# Patient Record
Sex: MALE | Race: WHITE | Employment: OTHER | URBAN - METROPOLITAN AREA
[De-identification: names, ages, dates, MRNs, and addresses within clinical notes are randomized per-mention and may not be internally consistent; named-entity substitution may affect disease eponyms.]

---

## 2017-01-05 ENCOUNTER — TELEPHONE (OUTPATIENT)
Dept: CARDIOLOGY CLINIC | Age: 82
End: 2017-01-05

## 2017-01-05 NOTE — TELEPHONE ENCOUNTER
----- Message from Jp Duarte MD sent at 1/5/2017 12:53 AM EST -----  Echo shows normal heart function, valve.

## 2017-06-20 ENCOUNTER — CLINICAL SUPPORT (OUTPATIENT)
Dept: CARDIOLOGY CLINIC | Age: 82
End: 2017-06-20

## 2017-06-20 DIAGNOSIS — R00.1 BRADYCARDIA: ICD-10-CM

## 2017-06-20 DIAGNOSIS — I49.5 SSS (SICK SINUS SYNDROME) (HCC): ICD-10-CM

## 2017-06-20 DIAGNOSIS — Z95.0 PACEMAKER: Primary | ICD-10-CM

## 2017-06-23 NOTE — PROGRESS NOTES
See device report - MDT DCPM in office device check, next check in 6 months, declines remote home monitoring.

## 2017-11-09 ENCOUNTER — TELEPHONE (OUTPATIENT)
Dept: CARDIOLOGY CLINIC | Age: 82
End: 2017-11-09

## 2017-11-09 NOTE — TELEPHONE ENCOUNTER
Verified patient with two identifiers. Spoke with pt, asking for refill on metoprolol, advised pt to set up an appt with Dr. Loren Chaudhry or PCP for a refill since we have not seen him in office since 12/16. Pt stated he would call his PCP.

## 2017-11-09 NOTE — TELEPHONE ENCOUNTER
Please call pt about the status of the refill request for his metoprolol that was faxed (he said multiple times) from [facility], last time 11/6/17.     Thanks,

## 2018-01-04 ENCOUNTER — CLINICAL SUPPORT (OUTPATIENT)
Dept: CARDIOLOGY CLINIC | Age: 83
End: 2018-01-04

## 2018-01-04 DIAGNOSIS — Z95.0 PACEMAKER: Primary | ICD-10-CM

## 2018-01-04 DIAGNOSIS — I49.5 SSS (SICK SINUS SYNDROME) (HCC): ICD-10-CM

## 2018-01-04 DIAGNOSIS — R00.1 BRADYCARDIA: ICD-10-CM

## 2019-01-24 ENCOUNTER — OFFICE VISIT (OUTPATIENT)
Dept: CARDIOLOGY CLINIC | Age: 84
End: 2019-01-24

## 2019-01-24 ENCOUNTER — CLINICAL SUPPORT (OUTPATIENT)
Dept: CARDIOLOGY CLINIC | Age: 84
End: 2019-01-24

## 2019-01-24 VITALS
SYSTOLIC BLOOD PRESSURE: 124 MMHG | WEIGHT: 152.2 LBS | BODY MASS INDEX: 21.79 KG/M2 | RESPIRATION RATE: 18 BRPM | HEIGHT: 70 IN | DIASTOLIC BLOOD PRESSURE: 84 MMHG | HEART RATE: 88 BPM

## 2019-01-24 DIAGNOSIS — R00.1 BRADYCARDIA: ICD-10-CM

## 2019-01-24 DIAGNOSIS — Z95.0 PACEMAKER: ICD-10-CM

## 2019-01-24 DIAGNOSIS — I25.10 CORONARY ARTERY DISEASE INVOLVING NATIVE HEART WITHOUT ANGINA PECTORIS, UNSPECIFIED VESSEL OR LESION TYPE: ICD-10-CM

## 2019-01-24 DIAGNOSIS — I49.5 SSS (SICK SINUS SYNDROME) (HCC): ICD-10-CM

## 2019-01-24 DIAGNOSIS — Z45.018 PACEMAKER REPROGRAMMING/CHECK: Primary | ICD-10-CM

## 2019-01-24 DIAGNOSIS — I44.2 CHB (COMPLETE HEART BLOCK) (HCC): ICD-10-CM

## 2019-01-24 DIAGNOSIS — Z95.0 CARDIAC PACEMAKER IN SITU: Primary | ICD-10-CM

## 2019-01-24 NOTE — PROGRESS NOTES
1. Have you been to the ER, urgent care clinic since your last visit? Hospitalized since your last visit? No 
 
2. Have you seen or consulted any other health care providers outside of the [address] [place] [address] since your last visit? Include any pap smears or colon screening. No 
 
Chief Complaint Patient presents with  Pacemaker Check Yearly appt. C/O SOB with exertion.

## 2019-01-24 NOTE — PROGRESS NOTES
Subjective:  
  
Amanda Peres is a 80 y.o. male is here for long follow up. He has pmhx significant for CAD with  RCA, aortic stenosis s/p aortic valve replacement in 2014, hyperlipidemia, SSS with pacemaker, and HTN. The patient denies chest pain/ shortness of breath, orthopnea, PND, LE edema, palpitations, syncope, presyncope or fatigue. Notes low grade HA at times. Patient Active Problem List  
 Diagnosis Date Noted  Bradycardia 12/16/2016  SSS (sick sinus syndrome) ([facility] [address]  Hyperlipidemia  H/O aortic valve stenosis  CAD (coronary artery disease), native coronary artery No primary care provider on file. Past Medical History:  
Diagnosis Date  CAD (coronary artery disease), native coronary artery  RCA  
 H/O aortic valve stenosis   
 post valve replacement  Hyperlipidemia  Raynaud's disease  SSS (sick sinus syndrome) ([facility] [address]   
 pacemaker Past Surgical History:  
Procedure Laterality Date  HX AORTIC VALVE REPLACEMENT Allergies Allergen Reactions  Codeine Other (comments)  
   pt states that he became high with morphine--yrs ago 
 pt states that he became high with morphine--yrs ago  Simvastatin Myalgia and Other (comments) And weakness And weakness Family History Problem Relation Age of Onset  Stroke Mother  Stroke Father  Cancer Sister  Heart Disease Brother MI  
 negative for cardiac disease Social History Socioeconomic History  Marital status:  Spouse name: Not on file  Number of children: Not on file  Years of education: Not on file  Highest education level: Not on file Tobacco Use  Smoking status: Current Every Day Smoker Packs/day: 0.50 Years: 30.00 Pack years: 15.00 Types: Cigarettes  Smokeless tobacco: Never Used Substance and Sexual Activity  Alcohol use: Yes Comment: one drink a day Current Outpatient Medications Medication Sig  
 metoprolol tartrate (LOPRESSOR) 25 mg tablet Take 25 mg by mouth daily.  aspirin 81 mg chewable tablet Take 81 mg by mouth daily.  OTHER No current facility-administered medications for this visit. Vitals:  
 01/24/19 1012 BP: 124/84 Pulse: 88 Resp: 18 Weight: 152 lb 3.2 oz (69 kg) Height: 5' 10\" (1.778 m) I have reviewed the nurses notes, vitals, problem list, allergy list, medical history, family, social history and medications. Review of Symptoms: 
 
General: Pt denies excessive weight gain or loss. Pt is able to conduct ADL's HEENT: Denies blurred vision, headaches, epistaxis and difficulty swallowing. Respiratory: Denies shortness of breath, AUGUSTIN, wheezing or stridor. Cardiovascular: Denies precordial pain, palpitations, edema or PND Gastrointestinal: Denies poor appetite, indigestion, abdominal pain or blood in stool Urinary: Denies dysuria, pyuria Musculoskeletal: Denies pain or swelling from muscles or joints Neurologic: Denies tremor, paresthesias, or sensory motor disturbance Skin: Denies rash, itching or texture change. Psych: Denies depression Physical Exam:   
 
General: Well developed, in no acute distress. HEENT: Eyes - PERRL, no jvd Heart:  Normal S1/S2 negative S3 or S4. Regular, no murmur, gallop or rub.  
Respiratory: Clear bilaterally x 4, no wheezing or rales Extremities:  No edema, normal cap refill, no cyanosis. Musculoskeletal: No clubbing Neuro: A&Ox3, speech clear, gait stable. Skin: Skin color is normal. No rashes or lesions. Non diaphoretic Vascular: 2+ pulses symmetric in all extremities Cardiographics Ekg: V paced. No results found for this or any previous visit. No results found for: WBC, HGBPOC, HGB, HGBP, HCTPOC, HCT, PHCT, RBCH, PLT, MCV, HGBEXT, HCTEXT, PLTEXT, HGBEXT, HCTEXT, PLTEXT No results found for: NA, K, CL, CO2, AGAP, GLU, BUN, CREA, BUCR, GFRAA, GFRNA, CA, TBIL, TBILI, GPT, SGOT, AP, TP, ALB, GLOB, AGRAT, ALT No results found for: TSH, TSH2, TSH3, TSHP, TSHEXT, TSHEXT Assessment: ICD-10-CM ICD-9-CM 1. Pacemaker reprogramming/check Z45.018 V53.31 AMB POC EKG ROUTINE W/ 12 LEADS, INTER & REP  
   2D ECHO COMPLETE ADULT (TTE) W OR WO CONTR 2. Bradycardia R00.1 427.89 2D ECHO COMPLETE ADULT (TTE) W OR WO CONTR 3. Pacemaker Z95.0 V45.01 2D ECHO COMPLETE ADULT (TTE) W OR WO CONTR 4. SSS (sick sinus syndrome) (HCC) I49.5 427.81 2D ECHO COMPLETE ADULT (TTE) W OR WO CONTR 5. Coronary artery disease involving native heart without angina pectoris, unspecified vessel or lesion type I25.10 414.01 2D ECHO COMPLETE ADULT (TTE) W OR WO CONTR Orders Placed This Encounter  AMB POC EKG ROUTINE W/ 12 LEADS, INTER & REP Order Specific Question:   Reason for Exam: Answer:   routine  2D ECHO COMPLETE ADULT (TTE) W OR WO CONTR Standing Status:   Future Standing Expiration Date:   1/24/2020 Order Specific Question:   Reason for Exam: Answer:   100% RVP Order Specific Question:   Contrast Enhancement (Bubble Study, Definity, Optison) may be used if criteria listed in established evidence-based protocol has been identified. Answer:   Yes Plan:  
 
Mr Rody Lujan is here for long follow up, last seen in 2016. He has pmhx significant for CAD with  RCA, aortic stenosis s/p aortic valve replacement in 2014, hyperlipidemia, SSS with pacemaker, and HTN. Echo 12/16 with EF 55-60%. He is 83% AP and 100% RVP. Will repeat echo. Anticipate gen change within 2 years. Continue medical management for HTN, CAD and SSS. Thank you for allowing me to participate in Sandy Upton 's care. Gian Connell NP Patient seen and examined. All pertinent data reviewed. I have reviewed detailed note as outlined by Gian Connell NP. Case discussed with Nursing/medical assistant staff and Gian Connell NP. Plans as outlined. Doing well. A paced 87% for sick sinus and v paced 100% for chb. Cont med rx for cad. Obtain echo for sob. F/u in one year if lvef wnl.  
 
Veronica Kelley MD, Howard Rushing

## 2019-02-05 ENCOUNTER — CLINICAL SUPPORT (OUTPATIENT)
Dept: CARDIOLOGY CLINIC | Age: 84
End: 2019-02-05

## 2019-02-05 VITALS
SYSTOLIC BLOOD PRESSURE: 124 MMHG | WEIGHT: 152 LBS | BODY MASS INDEX: 21.76 KG/M2 | DIASTOLIC BLOOD PRESSURE: 84 MMHG | HEIGHT: 70 IN

## 2019-02-05 DIAGNOSIS — I49.5 SSS (SICK SINUS SYNDROME) (HCC): ICD-10-CM

## 2019-02-05 DIAGNOSIS — Z95.0 PACEMAKER: ICD-10-CM

## 2019-02-05 DIAGNOSIS — R00.1 BRADYCARDIA: ICD-10-CM

## 2019-02-05 DIAGNOSIS — Z45.018 PACEMAKER REPROGRAMMING/CHECK: ICD-10-CM

## 2019-02-05 DIAGNOSIS — I25.10 CORONARY ARTERY DISEASE INVOLVING NATIVE HEART WITHOUT ANGINA PECTORIS, UNSPECIFIED VESSEL OR LESION TYPE: ICD-10-CM

## 2019-02-06 LAB
ECHO AO ROOT DIAM: 2.1 CM
ECHO AV AREA PEAK VELOCITY: 1.8 CM2
ECHO AV AREA VTI: 1.7 CM2
ECHO AV AREA/BSA PEAK VELOCITY: 1 CM2/M2
ECHO AV AREA/BSA VTI: 0.9 CM2/M2
ECHO AV MEAN GRADIENT: 5.8 MMHG
ECHO AV PEAK GRADIENT: 10.2 MMHG
ECHO AV PEAK VELOCITY: 159.48 CM/S
ECHO AV VTI: 33.65 CM
ECHO EST RA PRESSURE: 3 MMHG
ECHO LA AREA 4C: 23 CM2
ECHO LA MAJOR AXIS: 3.85 CM
ECHO LA TO AORTIC ROOT RATIO: 1.83
ECHO LA VOL 2C: 78.19 ML (ref 18–58)
ECHO LA VOL 4C: 71.97 ML (ref 18–58)
ECHO LA VOL BP: 81.27 ML (ref 18–58)
ECHO LA VOL/BSA BIPLANE: 43.75 ML/M2 (ref 16–28)
ECHO LA VOLUME INDEX A2C: 42.09 ML/M2 (ref 16–28)
ECHO LA VOLUME INDEX A4C: 38.75 ML/M2 (ref 16–28)
ECHO LV E' LATERAL VELOCITY: 7.8 CM/S
ECHO LV E' SEPTAL VELOCITY: 3.51 CM/S
ECHO LV INTERNAL DIMENSION DIASTOLIC: 3.67 CM (ref 4.2–5.9)
ECHO LV INTERNAL DIMENSION SYSTOLIC: 2.71 CM
ECHO LV IVSD: 1.21 CM (ref 0.6–1)
ECHO LV MASS 2D: 153.5 G (ref 88–224)
ECHO LV MASS INDEX 2D: 82.6 G/M2 (ref 49–115)
ECHO LV POSTERIOR WALL DIASTOLIC: 1.07 CM (ref 0.6–1)
ECHO LVOT DIAM: 1.98 CM
ECHO LVOT PEAK GRADIENT: 3.6 MMHG
ECHO LVOT PEAK VELOCITY: 94.69 CM/S
ECHO LVOT SV: 57.6 ML
ECHO LVOT VTI: 18.79 CM
ECHO MV A VELOCITY: 147.99 CM/S
ECHO MV AREA PHT: 2.8 CM2
ECHO MV AREA VTI: 1.4 CM2
ECHO MV E DECELERATION TIME (DT): 272.7 MS
ECHO MV E VELOCITY: 0.67 CM/S
ECHO MV E/A RATIO: 0
ECHO MV E/E' LATERAL: 0.09
ECHO MV E/E' RATIO (AVERAGED): 0.14
ECHO MV E/E' SEPTAL: 0.19
ECHO MV MAX VELOCITY: 169.94 CM/S
ECHO MV MEAN GRADIENT: 2.9 MMHG
ECHO MV PEAK GRADIENT: 11.6 MMHG
ECHO MV PRESSURE HALF TIME (PHT): 79.1 MS
ECHO MV VTI: 41.59 CM
ECHO PULMONARY ARTERY SYSTOLIC PRESSURE (PASP): 22.6 MMHG
ECHO RIGHT VENTRICULAR SYSTOLIC PRESSURE (RVSP): 22.6 MMHG
ECHO RV TAPSE: 1.65 CM (ref 1.5–2)
ECHO TV REGURGITANT MAX VELOCITY: 221.22 CM/S
ECHO TV REGURGITANT PEAK GRADIENT: 19.6 MMHG

## 2019-02-07 ENCOUNTER — TELEPHONE (OUTPATIENT)
Dept: CARDIOLOGY CLINIC | Age: 84
End: 2019-02-07

## 2019-05-13 ENCOUNTER — CLINICAL SUPPORT (OUTPATIENT)
Dept: CARDIOLOGY CLINIC | Age: 84
End: 2019-05-13

## 2019-05-13 DIAGNOSIS — R00.1 BRADYCARDIA: ICD-10-CM

## 2019-05-13 DIAGNOSIS — I44.2 CHB (COMPLETE HEART BLOCK) (HCC): ICD-10-CM

## 2019-05-13 DIAGNOSIS — Z95.0 PACEMAKER: Primary | ICD-10-CM

## 2019-08-20 ENCOUNTER — OFFICE VISIT (OUTPATIENT)
Dept: CARDIOLOGY CLINIC | Age: 84
End: 2019-08-20

## 2019-08-20 DIAGNOSIS — I49.5 SSS (SICK SINUS SYNDROME) (HCC): ICD-10-CM

## 2019-08-20 DIAGNOSIS — Z95.0 CARDIAC PACEMAKER IN SITU: Primary | ICD-10-CM

## 2019-12-02 ENCOUNTER — CLINICAL SUPPORT (OUTPATIENT)
Dept: CARDIOLOGY CLINIC | Age: 84
End: 2019-12-02

## 2019-12-02 DIAGNOSIS — I49.5 SSS (SICK SINUS SYNDROME) (HCC): ICD-10-CM

## 2019-12-02 DIAGNOSIS — Z95.0 CARDIAC PACEMAKER IN SITU: Primary | ICD-10-CM

## 2019-12-03 ENCOUNTER — TELEPHONE (OUTPATIENT)
Dept: CARDIOLOGY CLINIC | Age: 84
End: 2019-12-03

## 2019-12-03 NOTE — TELEPHONE ENCOUNTER
Called, spoke to pt, two identifiers verified. Pt states sent remote transmission last night, was having trouble with his remote box. Advised pt remote transmission was received. Pt verbalized understanding of information discussed w/ no further questions at this time.      Cinthia Ralph RN

## 2020-01-01 ENCOUNTER — OFFICE VISIT (OUTPATIENT)
Dept: CARDIOLOGY CLINIC | Age: 85
End: 2020-01-01
Payer: MEDICARE

## 2020-01-01 DIAGNOSIS — Z95.0 CARDIAC PACEMAKER IN SITU: Primary | ICD-10-CM

## 2020-01-01 DIAGNOSIS — I44.2 CHB (COMPLETE HEART BLOCK) (HCC): ICD-10-CM

## 2020-01-01 PROCEDURE — 93294 REM INTERROG EVL PM/LDLS PM: CPT | Performed by: INTERNAL MEDICINE

## 2020-02-27 ENCOUNTER — OFFICE VISIT (OUTPATIENT)
Dept: CARDIOLOGY CLINIC | Age: 85
End: 2020-02-27

## 2020-02-27 ENCOUNTER — CLINICAL SUPPORT (OUTPATIENT)
Dept: CARDIOLOGY CLINIC | Age: 85
End: 2020-02-27

## 2020-02-27 VITALS
SYSTOLIC BLOOD PRESSURE: 130 MMHG | RESPIRATION RATE: 18 BRPM | OXYGEN SATURATION: 96 % | WEIGHT: 148 LBS | HEIGHT: 70 IN | DIASTOLIC BLOOD PRESSURE: 80 MMHG | BODY MASS INDEX: 21.19 KG/M2

## 2020-02-27 DIAGNOSIS — Z95.0 CARDIAC PACEMAKER IN SITU: ICD-10-CM

## 2020-02-27 DIAGNOSIS — I49.5 SSS (SICK SINUS SYNDROME) (HCC): Primary | ICD-10-CM

## 2020-02-27 DIAGNOSIS — Z72.0 TOBACCO USE: ICD-10-CM

## 2020-02-27 DIAGNOSIS — Z95.0 CARDIAC PACEMAKER IN SITU: Primary | ICD-10-CM

## 2020-02-27 DIAGNOSIS — R00.1 BRADYCARDIA: ICD-10-CM

## 2020-02-27 DIAGNOSIS — I44.2 CHB (COMPLETE HEART BLOCK) (HCC): ICD-10-CM

## 2020-02-27 DIAGNOSIS — I49.5 SSS (SICK SINUS SYNDROME) (HCC): ICD-10-CM

## 2020-02-27 RX ORDER — FINASTERIDE 5 MG/1
5 TABLET, FILM COATED ORAL
COMMUNITY
Start: 2019-11-14

## 2020-02-27 RX ORDER — TAMSULOSIN HYDROCHLORIDE 0.4 MG/1
CAPSULE ORAL AS NEEDED
COMMUNITY
Start: 2020-01-03 | End: 2021-01-01

## 2020-02-27 NOTE — PROGRESS NOTES
Subjective:      Dieudonne Barrera is a 80 y.o. male is here for EP follow up. The patient denies chest pain, orthopnea, PND, LE edema, palpitations, syncope, presyncope. Some AUGUSTIN and fatigue. Continues to smoke. On new prostate medication that causes dizziness. Patient Active Problem List    Diagnosis Date Noted    Bradycardia 12/16/2016    [facility]SS [facility]sick [facility]) ([facility] [address]     Hyperlipidemia     H/O aortic valve stenosis     CAD (coronary artery disease), native coronary artery       No primary care provider on file. Past Medical History:   Diagnosis Date    CAD (coronary artery disease), native coronary artery      RCA    Creatinine elevation 2020    H/O aortic valve stenosis     post valve replacement    Hyperlipidemia     Raynaud's disease     SSS (sick sinus syndrome) ([facility] [address]     pacemaker      Past Surgical History:   Procedure Laterality Date    HX AORTIC VALVE REPLACEMENT       Allergies   Allergen Reactions    Codeine Other (comments)      pt states that he became high with morphine--yrs ago   pt states that he became high with morphine--yrs ago      Simvastatin Myalgia and Other (comments)     And weakness  And weakness        Family History   Problem Relation Age of Onset    Stroke Mother     Stroke Father     Cancer Sister     Heart Disease Brother         MI    negative for cardiac disease  Social History     Socioeconomic History    Marital status:      Spouse name: Not on file    Number of children: Not on file    Years of education: Not on file    Highest education level: Not on file   Tobacco Use    Smoking status: Current Every Day Smoker     Packs/day: 0.25     Years: 30.00     Pack years: 7.50     Types: Cigarettes    Smokeless tobacco: Never Used   Substance and Sexual Activity    Alcohol use: Not Currently     Current Outpatient Medications   Medication Sig    finasteride (PROSCAR) 5 mg tablet Take 5 mg by mouth.     tamsulosin (FLOMAX) 0.4 mg capsule tamsulosin 0.4 mg capsule    metoprolol tartrate (LOPRESSOR) 25 mg tablet Take 25 mg by mouth daily.  aspirin 81 mg chewable tablet Take 81 mg by mouth daily.  OTHER      No current facility-administered medications for this visit. Vitals:    02/27/20 1343   BP: 130/80   Resp: 18   SpO2: 96%   Weight: 148 lb (67.1 kg)   Height: 5' 10\" (1.778 m)       I have reviewed the nurses notes, vitals, problem list, allergy list, medical history, family, social history and medications. Review of Symptoms:    General: Pt denies excessive weight gain or loss. Pt is able to conduct ADL's  HEENT: Denies blurred vision, headaches, epistaxis and difficulty swallowing. Respiratory: Denies shortness of breath, AUGUSTIN, wheezing or stridor. Cardiovascular: Denies precordial pain, palpitations, edema or PND  Gastrointestinal: Denies poor appetite, indigestion, abdominal pain or blood in stool  Urinary: Denies dysuria, pyuria  Musculoskeletal: Denies pain or swelling from muscles or joints  Neurologic: Denies tremor, paresthesias, or sensory motor disturbance  Skin: Denies rash, itching or texture change. Psych: Denies depression      Physical Exam:      General: Well developed, in no acute distress. HEENT: Eyes - PERRL, no jvd  Heart:  Normal S1/S2 negative S3 or S4. Regular, no murmur, gallop or rub. Respiratory: Clear bilaterally x 4, no wheezing or rales  Extremities:  No edema, normal cap refill, no cyanosis. Musculoskeletal: No clubbing  Neuro: A&Ox3, speech clear, gait stable. Skin: Skin color is normal. No rashes or lesions. Non diaphoretic  Vascular: 2+ pulses symmetric in all extremities    Cardiographics    Ekg: V paced      No results found for this or any previous visit.       No results found for: WBC, HGBPOC, HGB, HGBP, HCTPOC, HCT, PHCT, RBCH, PLT, MCV, HGBEXT, HCTEXT, PLTEXT, HGBEXT, HCTEXT, PLTEXT   No results found for: NA, K, CL, CO2, AGAP, GLU, BUN, CREA, BUCR, GFRAA, GFRNA, CA, TBIL, TBILI, GPT, SGOT, AP, TP, ALB, GLOB, AGRAT, ALT   No results found for: TSH, TSH2, TSH3, TSHP, TSHEXT, TSHEXT     Assessment:           ICD-10-CM ICD-9-CM    1. SSS (sick sinus syndrome) ([facility]) I49.5 427.81 AMB POC EKG ROUTINE W/ 12 LEADS, INTER & REP   2. CHB (complete heart block) ([facility]) I44.2 426.0    3. Bradycardia R00.1 427.89    4. Cardiac pacemaker in situ Z95.0 V45.01    5. Tobacco use Z72.0 305.1      Orders Placed This Encounter    AMB POC EKG ROUTINE W/ 12 LEADS, INTER & REP     Order Specific Question:   Reason for Exam:     Answer:   routine    finasteride (PROSCAR) 5 mg tablet     Sig: Take 5 mg by mouth.  tamsulosin (FLOMAX) 0.4 mg capsule     Sig: tamsulosin 0.4 mg capsule        Plan:     Mr Oliverio Winters is here for annual follow up and device check. He is 81.6% AP and 100% RVP with no events. Some dyspnea on exertion and fatigue. Last echo 3/19 - EF 51 - 55%. He has 18 mo remaining on his battery. Will continue to monitor remotely and follow up in 1 year. Continue medical management for SSS, HNT and tobacco use. Thank you for allowing me to participate in Garth Sotelo 's care.       Mindy Morrell NP

## 2020-02-27 NOTE — PROGRESS NOTES
Verified patient with 2 identifiers   Reviewed record in preparation for visit and obtained necessary documentation. Chief Complaint   Patient presents with    Pacemaker Check     Annual follow up     Shortness of Breath     cut down cigarettes  to 4 per day      1. Have you been to the ER, urgent care clinic since your last visit? Hospitalized since your last visit? No     2. Have you seen or consulted any other health care providers outside of the [address] [place] [address] since your last visit? Include any pap smears or colon screening.   Dr Melva Acevedo DMD

## 2020-09-14 ENCOUNTER — OFFICE VISIT (OUTPATIENT)
Dept: CARDIOLOGY CLINIC | Age: 85
End: 2020-09-14

## 2020-09-14 DIAGNOSIS — I44.2 CHB (COMPLETE HEART BLOCK) (HCC): ICD-10-CM

## 2020-09-14 DIAGNOSIS — I49.5 SSS (SICK SINUS SYNDROME) (HCC): Primary | ICD-10-CM

## 2020-09-14 DIAGNOSIS — Z95.0 CARDIAC PACEMAKER IN SITU: ICD-10-CM

## 2020-09-14 PROCEDURE — 93294 REM INTERROG EVL PM/LDLS PM: CPT | Performed by: INTERNAL MEDICINE

## 2020-09-14 PROCEDURE — 93296 REM INTERROG EVL PM/IDS: CPT | Performed by: INTERNAL MEDICINE

## 2021-01-01 ENCOUNTER — PATIENT OUTREACH (OUTPATIENT)
Dept: CASE MANAGEMENT | Age: 86
End: 2021-01-01

## 2021-01-01 ENCOUNTER — HOSPITAL ENCOUNTER (INPATIENT)
Age: 86
LOS: 9 days | Discharge: NURSING, REHAB OR LONG-TERM CARE | DRG: 240 | End: 2021-10-15
Attending: EMERGENCY MEDICINE | Admitting: SURGERY
Payer: MEDICARE

## 2021-01-01 ENCOUNTER — TELEPHONE (OUTPATIENT)
Dept: CARDIOLOGY CLINIC | Age: 86
End: 2021-01-01

## 2021-01-01 ENCOUNTER — ANESTHESIA (OUTPATIENT)
Dept: SURGERY | Age: 86
End: 2021-01-01
Payer: MEDICARE

## 2021-01-01 ENCOUNTER — APPOINTMENT (OUTPATIENT)
Dept: ULTRASOUND IMAGING | Age: 86
DRG: 682 | End: 2021-01-01
Attending: NURSE PRACTITIONER
Payer: MEDICARE

## 2021-01-01 ENCOUNTER — HOSPITAL ENCOUNTER (OUTPATIENT)
Age: 86
Setting detail: OUTPATIENT SURGERY
Discharge: HOME | End: 2021-05-17
Attending: SURGERY | Admitting: SURGERY
Payer: MEDICARE

## 2021-01-01 ENCOUNTER — APPOINTMENT (OUTPATIENT)
Dept: CT IMAGING | Age: 86
DRG: 682 | End: 2021-01-01
Attending: EMERGENCY MEDICINE
Payer: MEDICARE

## 2021-01-01 ENCOUNTER — HOSPITAL ENCOUNTER (OUTPATIENT)
Dept: PREADMISSION TESTING | Age: 86
Discharge: HOME | End: 2021-09-16

## 2021-01-01 ENCOUNTER — CLINICAL SUPPORT (OUTPATIENT)
Dept: CARDIOLOGY CLINIC | Age: 86
End: 2021-01-01
Payer: MEDICARE

## 2021-01-01 ENCOUNTER — APPOINTMENT (OUTPATIENT)
Dept: VASCULAR SURGERY | Age: 86
DRG: 240 | End: 2021-01-01
Attending: SURGERY
Payer: MEDICARE

## 2021-01-01 ENCOUNTER — OFFICE VISIT (OUTPATIENT)
Dept: CARDIOLOGY CLINIC | Age: 86
End: 2021-01-01

## 2021-01-01 ENCOUNTER — ANESTHESIA (OUTPATIENT)
Dept: ENDOSCOPY | Age: 86
DRG: 682 | End: 2021-01-01
Payer: MEDICARE

## 2021-01-01 ENCOUNTER — ANESTHESIA EVENT (OUTPATIENT)
Dept: SURGERY | Age: 86
DRG: 240 | End: 2021-01-01
Payer: MEDICARE

## 2021-01-01 ENCOUNTER — ANCILLARY PROCEDURE (OUTPATIENT)
Dept: CARDIOLOGY CLINIC | Age: 86
End: 2021-01-01
Payer: MEDICARE

## 2021-01-01 ENCOUNTER — HOSPITAL ENCOUNTER (INPATIENT)
Age: 86
LOS: 6 days | Discharge: NURSING, REHAB OR LONG-TERM CARE | DRG: 682 | End: 2021-11-08
Attending: EMERGENCY MEDICINE | Admitting: INTERNAL MEDICINE
Payer: MEDICARE

## 2021-01-01 ENCOUNTER — APPOINTMENT (OUTPATIENT)
Dept: GENERAL RADIOLOGY | Age: 86
DRG: 682 | End: 2021-01-01
Attending: EMERGENCY MEDICINE
Payer: MEDICARE

## 2021-01-01 ENCOUNTER — ANESTHESIA EVENT (OUTPATIENT)
Dept: ENDOSCOPY | Age: 86
DRG: 682 | End: 2021-01-01
Payer: MEDICARE

## 2021-01-01 ENCOUNTER — APPOINTMENT (OUTPATIENT)
Dept: GENERAL RADIOLOGY | Age: 86
DRG: 240 | End: 2021-01-01
Attending: EMERGENCY MEDICINE
Payer: MEDICARE

## 2021-01-01 ENCOUNTER — ANESTHESIA EVENT (OUTPATIENT)
Dept: SURGERY | Age: 86
End: 2021-01-01
Payer: MEDICARE

## 2021-01-01 ENCOUNTER — OFFICE VISIT (OUTPATIENT)
Dept: CARDIOLOGY CLINIC | Age: 86
End: 2021-01-01
Payer: MEDICARE

## 2021-01-01 ENCOUNTER — HOSPITAL ENCOUNTER (OUTPATIENT)
Dept: GENERAL RADIOLOGY | Age: 86
Discharge: HOME | End: 2021-09-13
Payer: MEDICARE

## 2021-01-01 ENCOUNTER — ANESTHESIA (OUTPATIENT)
Dept: SURGERY | Age: 86
DRG: 240 | End: 2021-01-01
Payer: MEDICARE

## 2021-01-01 VITALS
OXYGEN SATURATION: 100 % | TEMPERATURE: 97.8 F | BODY MASS INDEX: 20.02 KG/M2 | HEART RATE: 66 BPM | HEIGHT: 71 IN | RESPIRATION RATE: 16 BRPM | SYSTOLIC BLOOD PRESSURE: 158 MMHG | DIASTOLIC BLOOD PRESSURE: 69 MMHG | WEIGHT: 143 LBS

## 2021-01-01 VITALS
WEIGHT: 141 LBS | DIASTOLIC BLOOD PRESSURE: 84 MMHG | SYSTOLIC BLOOD PRESSURE: 164 MMHG | HEIGHT: 70 IN | BODY MASS INDEX: 20.19 KG/M2

## 2021-01-01 VITALS
WEIGHT: 141.98 LBS | SYSTOLIC BLOOD PRESSURE: 164 MMHG | HEART RATE: 65 BPM | TEMPERATURE: 97.8 F | DIASTOLIC BLOOD PRESSURE: 84 MMHG | OXYGEN SATURATION: 97 % | HEIGHT: 70 IN | BODY MASS INDEX: 20.33 KG/M2 | RESPIRATION RATE: 16 BRPM

## 2021-01-01 VITALS
DIASTOLIC BLOOD PRESSURE: 88 MMHG | RESPIRATION RATE: 18 BRPM | OXYGEN SATURATION: 95 % | TEMPERATURE: 98 F | WEIGHT: 141.98 LBS | HEART RATE: 67 BPM | SYSTOLIC BLOOD PRESSURE: 168 MMHG | BODY MASS INDEX: 19.88 KG/M2 | HEIGHT: 71 IN

## 2021-01-01 VITALS
DIASTOLIC BLOOD PRESSURE: 88 MMHG | HEART RATE: 88 BPM | HEIGHT: 70 IN | BODY MASS INDEX: 21.16 KG/M2 | WEIGHT: 147.8 LBS | RESPIRATION RATE: 18 BRPM | SYSTOLIC BLOOD PRESSURE: 148 MMHG

## 2021-01-01 DIAGNOSIS — Z95.0 CARDIAC PACEMAKER IN SITU: Primary | ICD-10-CM

## 2021-01-01 DIAGNOSIS — N17.9 ACUTE RENAL FAILURE, UNSPECIFIED ACUTE RENAL FAILURE TYPE (HCC): Primary | ICD-10-CM

## 2021-01-01 DIAGNOSIS — I44.2 CHB (COMPLETE HEART BLOCK) (HCC): ICD-10-CM

## 2021-01-01 DIAGNOSIS — Z45.018 PACEMAKER REPROGRAMMING/CHECK: ICD-10-CM

## 2021-01-01 DIAGNOSIS — R00.1 BRADYCARDIA: ICD-10-CM

## 2021-01-01 DIAGNOSIS — Z45.010 PACEMAKER AT END OF BATTERY LIFE: ICD-10-CM

## 2021-01-01 DIAGNOSIS — Z95.0 CARDIAC PACEMAKER IN SITU: ICD-10-CM

## 2021-01-01 DIAGNOSIS — Z45.010 PACEMAKER AT END OF BATTERY LIFE: Primary | ICD-10-CM

## 2021-01-01 DIAGNOSIS — I49.5 SSS (SICK SINUS SYNDROME) (HCC): ICD-10-CM

## 2021-01-01 DIAGNOSIS — I44.2 CHB (COMPLETE HEART BLOCK) (HCC): Primary | ICD-10-CM

## 2021-01-01 DIAGNOSIS — T81.49XA SURGICAL WOUND INFECTION: Primary | ICD-10-CM

## 2021-01-01 DIAGNOSIS — Z89.421 STATUS POST AMPUTATION OF TOE OF RIGHT FOOT (HCC): ICD-10-CM

## 2021-01-01 LAB
ABO + RH BLD: NORMAL
ALBUMIN SERPL-MCNC: 1.7 G/DL (ref 3.5–5)
ALBUMIN SERPL-MCNC: 1.8 G/DL (ref 3.5–5)
ALBUMIN SERPL-MCNC: 1.9 G/DL (ref 3.5–5)
ALBUMIN SERPL-MCNC: 1.9 G/DL (ref 3.5–5)
ALBUMIN SERPL-MCNC: 2 G/DL (ref 3.5–5)
ALBUMIN SERPL-MCNC: 2.5 G/DL (ref 3.5–5)
ALBUMIN/GLOB SERPL: 0.5 {RATIO} (ref 1.1–2.2)
ALBUMIN/GLOB SERPL: 0.5 {RATIO} (ref 1.1–2.2)
ALBUMIN/GLOB SERPL: 0.6 {RATIO} (ref 1.1–2.2)
ALP SERPL-CCNC: 68 U/L (ref 45–117)
ALP SERPL-CCNC: 84 U/L (ref 45–117)
ALP SERPL-CCNC: 92 U/L (ref 45–117)
ALT SERPL-CCNC: 10 U/L (ref 12–78)
ALT SERPL-CCNC: 17 U/L (ref 12–78)
ALT SERPL-CCNC: 18 U/L (ref 12–78)
ANION GAP SERPL CALC-SCNC: 0 MMOL/L (ref 5–15)
ANION GAP SERPL CALC-SCNC: 0 MMOL/L (ref 5–15)
ANION GAP SERPL CALC-SCNC: 1 MMOL/L (ref 5–15)
ANION GAP SERPL CALC-SCNC: 10 MMOL/L (ref 5–15)
ANION GAP SERPL CALC-SCNC: 10 MMOL/L (ref 5–15)
ANION GAP SERPL CALC-SCNC: 2 MMOL/L (ref 5–15)
ANION GAP SERPL CALC-SCNC: 2 MMOL/L (ref 5–15)
ANION GAP SERPL CALC-SCNC: 3 MMOL/L (ref 5–15)
ANION GAP SERPL CALC-SCNC: 3 MMOL/L (ref 5–15)
ANION GAP SERPL CALC-SCNC: 4 MMOL/L (ref 5–15)
ANION GAP SERPL CALC-SCNC: 5 MMOL/L (ref 5–15)
ANION GAP SERPL CALC-SCNC: 6 MMOL/L (ref 5–15)
ANION GAP SERPL CALC-SCNC: 6 MMOL/L (ref 5–15)
ANION GAP SERPL CALC-SCNC: 7 MMOL/L (ref 5–15)
ANION GAP SERPL CALC-SCNC: 8 MMOL/L (ref 5–15)
ANION GAP SERPL CALC-SCNC: 8 MMOL/L (ref 5–15)
APPEARANCE UR: ABNORMAL
AST SERPL-CCNC: 13 U/L (ref 15–37)
AST SERPL-CCNC: 19 U/L (ref 15–37)
AST SERPL-CCNC: 28 U/L (ref 15–37)
ATRIAL RATE: 46 BPM
ATRIAL RATE: 68 BPM
BACTERIA SPEC CULT: ABNORMAL
BACTERIA SPEC CULT: NORMAL
BACTERIA SPEC CULT: NORMAL
BACTERIA URNS QL MICRO: NEGATIVE /HPF
BASOPHILS # BLD: 0 K/UL (ref 0–0.1)
BASOPHILS NFR BLD: 0 % (ref 0–1)
BASOPHILS NFR BLD: 1 % (ref 0–1)
BILIRUB SERPL-MCNC: 0.2 MG/DL (ref 0.2–1)
BILIRUB SERPL-MCNC: 0.2 MG/DL (ref 0.2–1)
BILIRUB SERPL-MCNC: 0.6 MG/DL (ref 0.2–1)
BILIRUB UR QL: NEGATIVE
BLD PROD TYP BPU: NORMAL
BLOOD GROUP ANTIBODIES SERPL: NORMAL
BPU ID: NORMAL
BUN SERPL-MCNC: 109 MG/DL (ref 6–20)
BUN SERPL-MCNC: 110 MG/DL (ref 6–20)
BUN SERPL-MCNC: 124 MG/DL (ref 6–20)
BUN SERPL-MCNC: 126 MG/DL (ref 6–20)
BUN SERPL-MCNC: 33 MG/DL (ref 6–20)
BUN SERPL-MCNC: 34 MG/DL (ref 6–20)
BUN SERPL-MCNC: 37 MG/DL (ref 6–20)
BUN SERPL-MCNC: 48 MG/DL (ref 6–20)
BUN SERPL-MCNC: 51 MG/DL (ref 6–20)
BUN SERPL-MCNC: 55 MG/DL (ref 6–20)
BUN SERPL-MCNC: 66 MG/DL (ref 6–20)
BUN SERPL-MCNC: 74 MG/DL (ref 6–20)
BUN SERPL-MCNC: 87 MG/DL (ref 6–20)
BUN SERPL-MCNC: 93 MG/DL (ref 6–20)
BUN/CREAT SERPL: 17 (ref 12–20)
BUN/CREAT SERPL: 18 (ref 12–20)
BUN/CREAT SERPL: 19 (ref 12–20)
BUN/CREAT SERPL: 25 (ref 12–20)
BUN/CREAT SERPL: 25 (ref 12–20)
BUN/CREAT SERPL: 27 (ref 12–20)
BUN/CREAT SERPL: 28 (ref 12–20)
BUN/CREAT SERPL: 28 (ref 12–20)
BUN/CREAT SERPL: 29 (ref 12–20)
BUN/CREAT SERPL: 30 (ref 12–20)
BUN/CREAT SERPL: 32 (ref 12–20)
BUN/CREAT SERPL: 32 (ref 12–20)
BUN/CREAT SERPL: 33 (ref 12–20)
BUN/CREAT SERPL: 34 (ref 12–20)
CALCIUM SERPL-MCNC: 8 MG/DL (ref 8.5–10.1)
CALCIUM SERPL-MCNC: 8.1 MG/DL (ref 8.5–10.1)
CALCIUM SERPL-MCNC: 8.2 MG/DL (ref 8.5–10.1)
CALCIUM SERPL-MCNC: 8.3 MG/DL (ref 8.5–10.1)
CALCIUM SERPL-MCNC: 8.3 MG/DL (ref 8.5–10.1)
CALCIUM SERPL-MCNC: 8.4 MG/DL (ref 8.5–10.1)
CALCIUM SERPL-MCNC: 8.5 MG/DL (ref 8.5–10.1)
CALCIUM SERPL-MCNC: 8.6 MG/DL (ref 8.5–10.1)
CALCIUM SERPL-MCNC: 8.8 MG/DL (ref 8.5–10.1)
CALCIUM SERPL-MCNC: 8.8 MG/DL (ref 8.5–10.1)
CALCIUM SERPL-MCNC: 8.9 MG/DL (ref 8.5–10.1)
CALCULATED R AXIS, ECG10: -65 DEGREES
CALCULATED R AXIS, ECG10: -81 DEGREES
CALCULATED T AXIS, ECG11: 111 DEGREES
CALCULATED T AXIS, ECG11: 96 DEGREES
CC UR VC: ABNORMAL
CHLORIDE SERPL-SCNC: 103 MMOL/L (ref 97–108)
CHLORIDE SERPL-SCNC: 105 MMOL/L (ref 97–108)
CHLORIDE SERPL-SCNC: 106 MMOL/L (ref 97–108)
CHLORIDE SERPL-SCNC: 106 MMOL/L (ref 97–108)
CHLORIDE SERPL-SCNC: 108 MMOL/L (ref 97–108)
CHLORIDE SERPL-SCNC: 109 MMOL/L (ref 97–108)
CHLORIDE SERPL-SCNC: 112 MMOL/L (ref 97–108)
CHLORIDE SERPL-SCNC: 114 MMOL/L (ref 97–108)
CHLORIDE SERPL-SCNC: 114 MMOL/L (ref 97–108)
CHLORIDE SERPL-SCNC: 115 MMOL/L (ref 97–108)
CHLORIDE SERPL-SCNC: 115 MMOL/L (ref 97–108)
CHLORIDE SERPL-SCNC: 116 MMOL/L (ref 97–108)
CHLORIDE SERPL-SCNC: 117 MMOL/L (ref 97–108)
CHLORIDE UR-SCNC: 23 MMOL/L
CO2 SERPL-SCNC: 21 MMOL/L (ref 21–32)
CO2 SERPL-SCNC: 22 MMOL/L (ref 21–32)
CO2 SERPL-SCNC: 23 MMOL/L (ref 21–32)
CO2 SERPL-SCNC: 24 MMOL/L (ref 21–32)
CO2 SERPL-SCNC: 24 MMOL/L (ref 21–32)
CO2 SERPL-SCNC: 25 MMOL/L (ref 21–32)
CO2 SERPL-SCNC: 25 MMOL/L (ref 21–32)
CO2 SERPL-SCNC: 26 MMOL/L (ref 21–32)
CO2 SERPL-SCNC: 26 MMOL/L (ref 21–32)
CO2 SERPL-SCNC: 28 MMOL/L (ref 21–32)
CO2 SERPL-SCNC: 33 MMOL/L (ref 21–32)
COLOR UR: ABNORMAL
COMMENT, HOLDF: NORMAL
COVID-19 RAPID TEST, COVR: NOT DETECTED
CREAT SERPL-MCNC: 1.78 MG/DL (ref 0.7–1.3)
CREAT SERPL-MCNC: 1.79 MG/DL (ref 0.7–1.3)
CREAT SERPL-MCNC: 1.81 MG/DL (ref 0.7–1.3)
CREAT SERPL-MCNC: 1.82 MG/DL (ref 0.7–1.3)
CREAT SERPL-MCNC: 1.95 MG/DL (ref 0.7–1.3)
CREAT SERPL-MCNC: 1.97 MG/DL (ref 0.7–1.3)
CREAT SERPL-MCNC: 1.97 MG/DL (ref 0.7–1.3)
CREAT SERPL-MCNC: 2.02 MG/DL (ref 0.7–1.3)
CREAT SERPL-MCNC: 2.36 MG/DL (ref 0.7–1.3)
CREAT SERPL-MCNC: 2.48 MG/DL (ref 0.7–1.3)
CREAT SERPL-MCNC: 2.62 MG/DL (ref 0.7–1.3)
CREAT SERPL-MCNC: 2.76 MG/DL (ref 0.7–1.3)
CREAT SERPL-MCNC: 3.4 MG/DL (ref 0.7–1.3)
CREAT SERPL-MCNC: 3.41 MG/DL (ref 0.7–1.3)
CREAT SERPL-MCNC: 4.26 MG/DL (ref 0.7–1.3)
CREAT SERPL-MCNC: 5.05 MG/DL (ref 0.7–1.3)
CREAT UR-MCNC: 58.7 MG/DL
CROSSMATCH RESULT,%XM: NORMAL
DATE LAST DOSE: ABNORMAL
DIAGNOSIS, 93000: NORMAL
DIAGNOSIS, 93000: NORMAL
DIFFERENTIAL METHOD BLD: ABNORMAL
ECHO AO ASC DIAM: 3.11 CM
ECHO AO ROOT DIAM: 2.65 CM
ECHO AV AREA PEAK VELOCITY: 2.03 CM2
ECHO AV AREA/BSA PEAK VELOCITY: 1.1 CM2/M2
ECHO AV PEAK GRADIENT: 10.63 MMHG
ECHO AV PEAK VELOCITY: 163 CM/S
ECHO EST RA PRESSURE: 3 MMHG
ECHO LA AREA 4C: 26.48 CM2
ECHO LA MAJOR AXIS: 4.43 CM
ECHO LA MINOR AXIS: 2.46 CM
ECHO LA VOL 2C: 151.78 ML (ref 18–58)
ECHO LA VOL 4C: 91.32 ML (ref 18–58)
ECHO LA VOL BP: 132 ML (ref 18–58)
ECHO LA VOL/BSA BIPLANE: 73.33 ML/M2 (ref 16–28)
ECHO LA VOLUME INDEX A2C: 84.32 ML/M2 (ref 16–28)
ECHO LA VOLUME INDEX A4C: 50.73 ML/M2 (ref 16–28)
ECHO LV E' LATERAL VELOCITY: 7.04 CM/S
ECHO LV E' SEPTAL VELOCITY: 4.04 CM/S
ECHO LV EDV A2C: 52.97 ML
ECHO LV EDV A4C: 68.64 ML
ECHO LV EDV BP: 65.38 ML (ref 67–155)
ECHO LV EDV INDEX A4C: 38.1 ML/M2
ECHO LV EDV INDEX BP: 36.3 ML/M2
ECHO LV EDV NDEX A2C: 29.4 ML/M2
ECHO LV EJECTION FRACTION A2C: 67 PERCENT
ECHO LV EJECTION FRACTION A4C: 61 PERCENT
ECHO LV EJECTION FRACTION BIPLANE: 58.8 PERCENT (ref 55–100)
ECHO LV ESV A2C: 17.25 ML
ECHO LV ESV A4C: 26.89 ML
ECHO LV ESV BP: 26.94 ML (ref 22–58)
ECHO LV ESV INDEX A2C: 9.6 ML/M2
ECHO LV ESV INDEX A4C: 14.9 ML/M2
ECHO LV ESV INDEX BP: 15 ML/M2
ECHO LV INTERNAL DIMENSION DIASTOLIC: 4.17 CM (ref 4.2–5.9)
ECHO LV INTERNAL DIMENSION SYSTOLIC: 3.41 CM
ECHO LV IVSD: 1.03 CM (ref 0.6–1)
ECHO LV MASS 2D: 141.5 G (ref 88–224)
ECHO LV MASS INDEX 2D: 78.6 G/M2 (ref 49–115)
ECHO LV POSTERIOR WALL DIASTOLIC: 1.03 CM (ref 0.6–1)
ECHO LVOT DIAM: 2.01 CM
ECHO LVOT PEAK GRADIENT: 4.31 MMHG
ECHO LVOT PEAK VELOCITY: 103.81 CM/S
ECHO LVOT SV: 65.6 ML
ECHO LVOT VTI: 20.63 CM
ECHO MV A VELOCITY: 175.28 CM/S
ECHO MV AREA PHT: 2.47 CM2
ECHO MV AREA VTI: 1.07 CM2
ECHO MV E DECELERATION TIME (DT): 307.65 MS
ECHO MV E VELOCITY: 128.35 CM/S
ECHO MV E/A RATIO: 0.73
ECHO MV E/E' LATERAL: 18.23
ECHO MV E/E' RATIO (AVERAGED): 25
ECHO MV E/E' SEPTAL: 31.77
ECHO MV MAX VELOCITY: 184.84 CM/S
ECHO MV MEAN GRADIENT: 4.8 MMHG
ECHO MV PEAK GRADIENT: 13.67 MMHG
ECHO MV PRESSURE HALF TIME (PHT): 89.22 MS
ECHO MV VTI: 61.48 CM
ECHO RIGHT VENTRICULAR SYSTOLIC PRESSURE (RVSP): 32.92 MMHG
ECHO RV TAPSE: 1.88 CM (ref 1.5–2)
ECHO TV REGURGITANT MAX VELOCITY: 273.52 CM/S
ECHO TV REGURGITANT PEAK GRADIENT: 29.92 MMHG
EOSINOPHIL # BLD: 0.1 K/UL (ref 0–0.4)
EOSINOPHIL # BLD: 0.2 K/UL (ref 0–0.4)
EOSINOPHIL NFR BLD: 1 % (ref 0–7)
EOSINOPHIL NFR BLD: 1 % (ref 0–7)
EOSINOPHIL NFR BLD: 2 % (ref 0–7)
EOSINOPHIL NFR BLD: 2 % (ref 0–7)
EPITH CASTS URNS QL MICRO: ABNORMAL /LPF
ERYTHROCYTE [DISTWIDTH] IN BLOOD BY AUTOMATED COUNT: 14.9 % (ref 11.5–14.5)
ERYTHROCYTE [DISTWIDTH] IN BLOOD BY AUTOMATED COUNT: 15 % (ref 11.5–14.5)
ERYTHROCYTE [DISTWIDTH] IN BLOOD BY AUTOMATED COUNT: 15.6 % (ref 11.5–14.5)
ERYTHROCYTE [DISTWIDTH] IN BLOOD BY AUTOMATED COUNT: 15.7 % (ref 11.5–14.5)
ERYTHROCYTE [DISTWIDTH] IN BLOOD BY AUTOMATED COUNT: 16.6 % (ref 11.5–14.5)
ERYTHROCYTE [DISTWIDTH] IN BLOOD BY AUTOMATED COUNT: 16.7 % (ref 11.5–14.5)
ERYTHROCYTE [DISTWIDTH] IN BLOOD BY AUTOMATED COUNT: 16.8 % (ref 11.5–14.5)
ERYTHROCYTE [DISTWIDTH] IN BLOOD BY AUTOMATED COUNT: 17.2 % (ref 11.5–14.5)
FERRITIN SERPL-MCNC: 125 NG/ML (ref 26–388)
GLOBULIN SER CALC-MCNC: 3.5 G/DL (ref 2–4)
GLOBULIN SER CALC-MCNC: 3.9 G/DL (ref 2–4)
GLOBULIN SER CALC-MCNC: 4 G/DL (ref 2–4)
GLUCOSE SERPL-MCNC: 100 MG/DL (ref 65–100)
GLUCOSE SERPL-MCNC: 105 MG/DL (ref 65–100)
GLUCOSE SERPL-MCNC: 105 MG/DL (ref 65–100)
GLUCOSE SERPL-MCNC: 83 MG/DL (ref 65–100)
GLUCOSE SERPL-MCNC: 86 MG/DL (ref 65–100)
GLUCOSE SERPL-MCNC: 87 MG/DL (ref 65–100)
GLUCOSE SERPL-MCNC: 87 MG/DL (ref 65–100)
GLUCOSE SERPL-MCNC: 88 MG/DL (ref 65–100)
GLUCOSE SERPL-MCNC: 93 MG/DL (ref 65–100)
GLUCOSE SERPL-MCNC: 93 MG/DL (ref 65–100)
GLUCOSE SERPL-MCNC: 94 MG/DL (ref 65–100)
GLUCOSE SERPL-MCNC: 95 MG/DL (ref 65–100)
GLUCOSE SERPL-MCNC: 98 MG/DL (ref 65–100)
GLUCOSE UR STRIP.AUTO-MCNC: NEGATIVE MG/DL
GRAM STN SPEC: ABNORMAL
GRAN CASTS URNS QL MICRO: ABNORMAL /LPF
HCT VFR BLD AUTO: 20.3 % (ref 36.6–50.3)
HCT VFR BLD AUTO: 23.6 % (ref 36.6–50.3)
HCT VFR BLD AUTO: 23.7 % (ref 36.6–50.3)
HCT VFR BLD AUTO: 24 % (ref 36.6–50.3)
HCT VFR BLD AUTO: 25.1 % (ref 36.6–50.3)
HCT VFR BLD AUTO: 25.2 % (ref 36.6–50.3)
HCT VFR BLD AUTO: 25.2 % (ref 36.6–50.3)
HCT VFR BLD AUTO: 25.8 % (ref 36.6–50.3)
HCT VFR BLD AUTO: 26.5 % (ref 36.6–50.3)
HCT VFR BLD AUTO: 28.1 % (ref 36.6–50.3)
HCT VFR BLD AUTO: 30.3 % (ref 36.6–50.3)
HCT VFR BLD AUTO: 31.3 % (ref 36.6–50.3)
HGB BLD-MCNC: 6.3 G/DL (ref 12.1–17)
HGB BLD-MCNC: 7.3 G/DL (ref 12.1–17)
HGB BLD-MCNC: 7.3 G/DL (ref 12.1–17)
HGB BLD-MCNC: 7.4 G/DL (ref 12.1–17)
HGB BLD-MCNC: 7.6 G/DL (ref 12.1–17)
HGB BLD-MCNC: 7.7 G/DL (ref 12.1–17)
HGB BLD-MCNC: 7.8 G/DL (ref 12.1–17)
HGB BLD-MCNC: 8 G/DL (ref 12.1–17)
HGB BLD-MCNC: 8 G/DL (ref 12.1–17)
HGB BLD-MCNC: 9.1 G/DL (ref 12.1–17)
HGB BLD-MCNC: 9.3 G/DL (ref 12.1–17)
HGB BLD-MCNC: 9.8 G/DL (ref 12.1–17)
HGB UR QL STRIP: ABNORMAL
HISTORY CHECKED?,CKHIST: NORMAL
HYALINE CASTS URNS QL MICRO: ABNORMAL /LPF (ref 0–5)
IMM GRANULOCYTES # BLD AUTO: 0 K/UL (ref 0–0.04)
IMM GRANULOCYTES # BLD AUTO: 0.1 K/UL (ref 0–0.04)
IMM GRANULOCYTES NFR BLD AUTO: 0 % (ref 0–0.5)
IMM GRANULOCYTES NFR BLD AUTO: 1 % (ref 0–0.5)
IRON SATN MFR SERPL: 16 % (ref 20–50)
IRON SERPL-MCNC: 20 UG/DL (ref 35–150)
KETONES UR QL STRIP.AUTO: NEGATIVE MG/DL
LACTATE SERPL-SCNC: 0.6 MMOL/L (ref 0.4–2)
LEUKOCYTE ESTERASE UR QL STRIP.AUTO: ABNORMAL
LYMPHOCYTES # BLD: 0.7 K/UL (ref 0.8–3.5)
LYMPHOCYTES # BLD: 0.8 K/UL (ref 0.8–3.5)
LYMPHOCYTES # BLD: 0.8 K/UL (ref 0.8–3.5)
LYMPHOCYTES # BLD: 0.9 K/UL (ref 0.8–3.5)
LYMPHOCYTES NFR BLD: 13 % (ref 12–49)
LYMPHOCYTES NFR BLD: 7 % (ref 12–49)
LYMPHOCYTES NFR BLD: 7 % (ref 12–49)
LYMPHOCYTES NFR BLD: 8 % (ref 12–49)
MAGNESIUM SERPL-MCNC: 1.8 MG/DL (ref 1.6–2.4)
MAGNESIUM SERPL-MCNC: 1.9 MG/DL (ref 1.6–2.4)
MAGNESIUM SERPL-MCNC: 2.4 MG/DL (ref 1.6–2.4)
MCH RBC QN AUTO: 29.1 PG (ref 26–34)
MCH RBC QN AUTO: 29.4 PG (ref 26–34)
MCH RBC QN AUTO: 29.4 PG (ref 26–34)
MCH RBC QN AUTO: 29.6 PG (ref 26–34)
MCH RBC QN AUTO: 29.6 PG (ref 26–34)
MCH RBC QN AUTO: 29.7 PG (ref 26–34)
MCH RBC QN AUTO: 30 PG (ref 26–34)
MCH RBC QN AUTO: 30.8 PG (ref 26–34)
MCHC RBC AUTO-ENTMCNC: 29.5 G/DL (ref 30–36.5)
MCHC RBC AUTO-ENTMCNC: 30.2 G/DL (ref 30–36.5)
MCHC RBC AUTO-ENTMCNC: 30.7 G/DL (ref 30–36.5)
MCHC RBC AUTO-ENTMCNC: 30.9 G/DL (ref 30–36.5)
MCHC RBC AUTO-ENTMCNC: 31 G/DL (ref 30–36.5)
MCHC RBC AUTO-ENTMCNC: 31 G/DL (ref 30–36.5)
MCHC RBC AUTO-ENTMCNC: 31.3 G/DL (ref 30–36.5)
MCHC RBC AUTO-ENTMCNC: 32.4 G/DL (ref 30–36.5)
MCV RBC AUTO: 100.4 FL (ref 80–99)
MCV RBC AUTO: 94.9 FL (ref 80–99)
MCV RBC AUTO: 95.3 FL (ref 80–99)
MCV RBC AUTO: 95.3 FL (ref 80–99)
MCV RBC AUTO: 95.7 FL (ref 80–99)
MCV RBC AUTO: 95.9 FL (ref 80–99)
MCV RBC AUTO: 95.9 FL (ref 80–99)
MCV RBC AUTO: 96.4 FL (ref 80–99)
MONOCYTES # BLD: 0.6 K/UL (ref 0–1)
MONOCYTES # BLD: 0.7 K/UL (ref 0–1)
MONOCYTES NFR BLD: 10 % (ref 5–13)
MONOCYTES NFR BLD: 5 % (ref 5–13)
MONOCYTES NFR BLD: 6 % (ref 5–13)
MONOCYTES NFR BLD: 7 % (ref 5–13)
MUCOUS THREADS URNS QL MICRO: ABNORMAL /LPF
NEUTS SEG # BLD: 10.2 K/UL (ref 1.8–8)
NEUTS SEG # BLD: 4.8 K/UL (ref 1.8–8)
NEUTS SEG # BLD: 7.9 K/UL (ref 1.8–8)
NEUTS SEG # BLD: 9.2 K/UL (ref 1.8–8)
NEUTS SEG NFR BLD: 75 % (ref 32–75)
NEUTS SEG NFR BLD: 83 % (ref 32–75)
NEUTS SEG NFR BLD: 85 % (ref 32–75)
NEUTS SEG NFR BLD: 85 % (ref 32–75)
NITRITE UR QL STRIP.AUTO: NEGATIVE
NRBC # BLD: 0 K/UL (ref 0–0.01)
NRBC BLD-RTO: 0 PER 100 WBC
PH UR STRIP: 5 [PH] (ref 5–8)
PHOSPHATE SERPL-MCNC: 2.4 MG/DL (ref 2.6–4.7)
PHOSPHATE SERPL-MCNC: 2.5 MG/DL (ref 2.6–4.7)
PHOSPHATE SERPL-MCNC: 2.8 MG/DL (ref 2.6–4.7)
PHOSPHATE SERPL-MCNC: 3.4 MG/DL (ref 2.6–4.7)
PHOSPHATE SERPL-MCNC: 3.8 MG/DL (ref 2.6–4.7)
PHOSPHATE SERPL-MCNC: 3.8 MG/DL (ref 2.6–4.7)
PLATELET # BLD AUTO: 218 K/UL (ref 150–400)
PLATELET # BLD AUTO: 247 K/UL (ref 150–400)
PLATELET # BLD AUTO: 247 K/UL (ref 150–400)
PLATELET # BLD AUTO: 250 K/UL (ref 150–400)
PLATELET # BLD AUTO: 255 K/UL (ref 150–400)
PLATELET # BLD AUTO: 271 K/UL (ref 150–400)
PLATELET # BLD AUTO: 300 K/UL (ref 150–400)
PLATELET # BLD AUTO: 329 K/UL (ref 150–400)
PMV BLD AUTO: 10.1 FL (ref 8.9–12.9)
PMV BLD AUTO: 10.3 FL (ref 8.9–12.9)
PMV BLD AUTO: 10.4 FL (ref 8.9–12.9)
PMV BLD AUTO: 10.7 FL (ref 8.9–12.9)
PMV BLD AUTO: 10.9 FL (ref 8.9–12.9)
PMV BLD AUTO: 9.8 FL (ref 8.9–12.9)
POTASSIUM SERPL-SCNC: 3.3 MMOL/L (ref 3.5–5.1)
POTASSIUM SERPL-SCNC: 3.6 MMOL/L (ref 3.5–5.1)
POTASSIUM SERPL-SCNC: 3.6 MMOL/L (ref 3.5–5.1)
POTASSIUM SERPL-SCNC: 3.7 MMOL/L (ref 3.5–5.1)
POTASSIUM SERPL-SCNC: 3.8 MMOL/L (ref 3.5–5.1)
POTASSIUM SERPL-SCNC: 3.8 MMOL/L (ref 3.5–5.1)
POTASSIUM SERPL-SCNC: 4 MMOL/L (ref 3.5–5.1)
POTASSIUM SERPL-SCNC: 4 MMOL/L (ref 3.5–5.1)
POTASSIUM SERPL-SCNC: 4.2 MMOL/L (ref 3.5–5.1)
POTASSIUM SERPL-SCNC: 4.3 MMOL/L (ref 3.5–5.1)
POTASSIUM SERPL-SCNC: 4.4 MMOL/L (ref 3.5–5.1)
POTASSIUM SERPL-SCNC: 4.6 MMOL/L (ref 3.5–5.1)
POTASSIUM SERPL-SCNC: 4.7 MMOL/L (ref 3.5–5.1)
POTASSIUM SERPL-SCNC: 4.8 MMOL/L (ref 3.5–5.1)
PROCALCITONIN SERPL-MCNC: 0.12 NG/ML
PROT SERPL-MCNC: 5.4 G/DL (ref 6.4–8.2)
PROT SERPL-MCNC: 6 G/DL (ref 6.4–8.2)
PROT SERPL-MCNC: 6.4 G/DL (ref 6.4–8.2)
PROT UR STRIP-MCNC: 100 MG/DL
Q-T INTERVAL, ECG07: 504 MS
Q-T INTERVAL, ECG07: 506 MS
QRS DURATION, ECG06: 190 MS
QRS DURATION, ECG06: 194 MS
QTC CALCULATION (BEZET), ECG08: 524 MS
QTC CALCULATION (BEZET), ECG08: 576 MS
RBC # BLD AUTO: 2.13 M/UL (ref 4.1–5.7)
RBC # BLD AUTO: 2.46 M/UL (ref 4.1–5.7)
RBC # BLD AUTO: 2.5 M/UL (ref 4.1–5.7)
RBC # BLD AUTO: 2.72 M/UL (ref 4.1–5.7)
RBC # BLD AUTO: 2.75 M/UL (ref 4.1–5.7)
RBC # BLD AUTO: 2.95 M/UL (ref 4.1–5.7)
RBC # BLD AUTO: 3.16 M/UL (ref 4.1–5.7)
RBC # BLD AUTO: 3.27 M/UL (ref 4.1–5.7)
RBC #/AREA URNS HPF: ABNORMAL /HPF (ref 0–5)
RBC MORPH BLD: ABNORMAL
REPORTED DOSE,DOSE: ABNORMAL UNITS
REPORTED DOSE/TIME,TMG: 2300
RIGHT CFA DIST SYS PSV: 240 CM/S
RIGHT DIST ATA VELOCITY: 0 CM/S
RIGHT DIST PTA PSV: 111.9 CM/S
RIGHT MID ATA VELOCITY: 0 CM/S
RIGHT PERONEAL DIST VELOCITY: 0 CM/S
RIGHT PERONEAL MID SYS PSV: 0 CM/S
RIGHT PERONEAL PROX SYS PSV: 0 CM/S
RIGHT POP A PROX VEL RATIO: 0.84
RIGHT POPLITEAL DIST SYS PSV: 34.2 CM/S
RIGHT POPLITEAL PROX SYS PSV: 50.3 CM/S
RIGHT PROX ATA VELOCITY: 0 CM/S
RIGHT PROX PFA A PSV: 139.8 CM/S
RIGHT PROX PTA PSV: 121 CM/S
RIGHT PTA MID SYS PSV: 122.8 CM/S
RIGHT SFA DIST VEL RATIO: 0.36
RIGHT SFA MID VEL RATIO: 1.7
RIGHT SFA PROX VEL RATIO: 0.4
RIGHT SUPER FEMORAL DIST SYS PSV: 59.8 CM/S
RIGHT SUPER FEMORAL MID SYS PSV: 164.8 CM/S
RIGHT SUPER FEMORAL PROX SYS PSV: 99.1 CM/S
SAMPLES BEING HELD,HOLD: NORMAL
SARS-COV-2, COV2: NORMAL
SARS-COV-2, XPLCVT: NOT DETECTED
SERVICE CMNT-IMP: ABNORMAL
SERVICE CMNT-IMP: NORMAL
SERVICE CMNT-IMP: NORMAL
SODIUM SERPL-SCNC: 133 MMOL/L (ref 136–145)
SODIUM SERPL-SCNC: 134 MMOL/L (ref 136–145)
SODIUM SERPL-SCNC: 135 MMOL/L (ref 136–145)
SODIUM SERPL-SCNC: 136 MMOL/L (ref 136–145)
SODIUM SERPL-SCNC: 141 MMOL/L (ref 136–145)
SODIUM SERPL-SCNC: 143 MMOL/L (ref 136–145)
SODIUM SERPL-SCNC: 143 MMOL/L (ref 136–145)
SODIUM SERPL-SCNC: 144 MMOL/L (ref 136–145)
SODIUM SERPL-SCNC: 145 MMOL/L (ref 136–145)
SODIUM SERPL-SCNC: 145 MMOL/L (ref 136–145)
SODIUM SERPL-SCNC: 146 MMOL/L (ref 136–145)
SODIUM SERPL-SCNC: 146 MMOL/L (ref 136–145)
SODIUM UR-SCNC: 36 MMOL/L
SOURCE, COVRS: NORMAL
SOURCE, COVRS: NORMAL
SP GR UR REFRACTOMETRY: 1.01 (ref 1–1.03)
SPECIMEN EXP DATE BLD: NORMAL
STATUS OF UNIT,%ST: NORMAL
TIBC SERPL-MCNC: 124 UG/DL (ref 250–450)
UA: UC IF INDICATED,UAUC: ABNORMAL
UNIT DIVISION, %UDIV: 0
UROBILINOGEN UR QL STRIP.AUTO: 0.2 EU/DL (ref 0.2–1)
VANCOMYCIN TROUGH SERPL-MCNC: 13.4 UG/ML (ref 5–10)
VENTRICULAR RATE, ECG03: 65 BPM
VENTRICULAR RATE, ECG03: 78 BPM
WBC # BLD AUTO: 10.8 K/UL (ref 4.1–11.1)
WBC # BLD AUTO: 11.5 K/UL (ref 4.1–11.1)
WBC # BLD AUTO: 12.1 K/UL (ref 4.1–11.1)
WBC # BLD AUTO: 6.5 K/UL (ref 4.1–11.1)
WBC # BLD AUTO: 7.4 K/UL (ref 4.1–11.1)
WBC # BLD AUTO: 9.2 K/UL (ref 4.1–11.1)
WBC # BLD AUTO: 9.6 K/UL (ref 4.1–11.1)
WBC # BLD AUTO: 9.7 K/UL (ref 4.1–11.1)
WBC URNS QL MICRO: >100 /HPF (ref 0–4)
YEAST BUDDING URNS QL: PRESENT
YEAST URNS QL MICRO: PRESENT

## 2021-01-01 PROCEDURE — 2709999900 HC NON-CHARGEABLE SUPPLY

## 2021-01-01 PROCEDURE — 97530 THERAPEUTIC ACTIVITIES: CPT

## 2021-01-01 PROCEDURE — 74011250637 HC RX REV CODE- 250/637: Performed by: INTERNAL MEDICINE

## 2021-01-01 PROCEDURE — 86923 COMPATIBILITY TEST ELECTRIC: CPT

## 2021-01-01 PROCEDURE — 76040000019: Performed by: INTERNAL MEDICINE

## 2021-01-01 PROCEDURE — 86901 BLOOD TYPING SEROLOGIC RH(D): CPT

## 2021-01-01 PROCEDURE — 82570 ASSAY OF URINE CREATININE: CPT

## 2021-01-01 PROCEDURE — 84100 ASSAY OF PHOSPHORUS: CPT

## 2021-01-01 PROCEDURE — 36415 COLL VENOUS BLD VENIPUNCTURE: CPT

## 2021-01-01 PROCEDURE — 74011250637 HC RX REV CODE- 250/637: Performed by: PODIATRIST

## 2021-01-01 PROCEDURE — 74011000258 HC RX REV CODE- 258: Performed by: NURSE PRACTITIONER

## 2021-01-01 PROCEDURE — 76060000031 HC ANESTHESIA FIRST 0.5 HR: Performed by: INTERNAL MEDICINE

## 2021-01-01 PROCEDURE — 30233N1 TRANSFUSION OF NONAUTOLOGOUS RED BLOOD CELLS INTO PERIPHERAL VEIN, PERCUTANEOUS APPROACH: ICD-10-PCS | Performed by: GENERAL ACUTE CARE HOSPITAL

## 2021-01-01 PROCEDURE — 80048 BASIC METABOLIC PNL TOTAL CA: CPT

## 2021-01-01 PROCEDURE — 74011250637 HC RX REV CODE- 250/637: Performed by: GENERAL ACUTE CARE HOSPITAL

## 2021-01-01 PROCEDURE — 83605 ASSAY OF LACTIC ACID: CPT

## 2021-01-01 PROCEDURE — 65270000029 HC RM PRIVATE

## 2021-01-01 PROCEDURE — 76060000033 HC ANESTHESIA 1 TO 1.5 HR: Performed by: SURGERY

## 2021-01-01 PROCEDURE — 77030040392 HC DRSG OPTIFOAM MDII -A

## 2021-01-01 PROCEDURE — 74011000250 HC RX REV CODE- 250: Performed by: NURSE ANESTHETIST, CERTIFIED REGISTERED

## 2021-01-01 PROCEDURE — 87205 SMEAR GRAM STAIN: CPT

## 2021-01-01 PROCEDURE — 83540 ASSAY OF IRON: CPT

## 2021-01-01 PROCEDURE — 74011250636 HC RX REV CODE- 250/636: Performed by: SURGERY

## 2021-01-01 PROCEDURE — 2709999900 HC NON-CHARGEABLE SUPPLY: Performed by: INTERNAL MEDICINE

## 2021-01-01 PROCEDURE — 77030019988 HC FCPS ENDOSC DISP BSC -B: Performed by: INTERNAL MEDICINE

## 2021-01-01 PROCEDURE — 0Y6M0ZF DETACHMENT AT RIGHT FOOT, PARTIAL 5TH RAY, OPEN APPROACH: ICD-10-PCS | Performed by: PODIATRIST

## 2021-01-01 PROCEDURE — 82436 ASSAY OF URINE CHLORIDE: CPT

## 2021-01-01 PROCEDURE — 76010000149 HC OR TIME 1 TO 1.5 HR: Performed by: SURGERY

## 2021-01-01 PROCEDURE — 83735 ASSAY OF MAGNESIUM: CPT

## 2021-01-01 PROCEDURE — 0DB68ZX EXCISION OF STOMACH, VIA NATURAL OR ARTIFICIAL OPENING ENDOSCOPIC, DIAGNOSTIC: ICD-10-PCS | Performed by: INTERNAL MEDICINE

## 2021-01-01 PROCEDURE — 87106 FUNGI IDENTIFICATION YEAST: CPT

## 2021-01-01 PROCEDURE — 74011250636 HC RX REV CODE- 250/636: Performed by: NURSE PRACTITIONER

## 2021-01-01 PROCEDURE — 74011250636 HC RX REV CODE- 250/636: Performed by: GENERAL ACUTE CARE HOSPITAL

## 2021-01-01 PROCEDURE — 85025 COMPLETE CBC W/AUTO DIFF WBC: CPT

## 2021-01-01 PROCEDURE — 71045 X-RAY EXAM CHEST 1 VIEW: CPT

## 2021-01-01 PROCEDURE — 74011250636 HC RX REV CODE- 250/636: Performed by: INTERNAL MEDICINE

## 2021-01-01 PROCEDURE — 80202 ASSAY OF VANCOMYCIN: CPT

## 2021-01-01 PROCEDURE — 80069 RENAL FUNCTION PANEL: CPT

## 2021-01-01 PROCEDURE — 74011000250 HC RX REV CODE- 250: Performed by: GENERAL ACUTE CARE HOSPITAL

## 2021-01-01 PROCEDURE — 76010000149 HC OR TIME 1 TO 1.5 HR: Performed by: PODIATRIST

## 2021-01-01 PROCEDURE — 0Y6M0ZD DETACHMENT AT RIGHT FOOT, PARTIAL 4TH RAY, OPEN APPROACH: ICD-10-PCS | Performed by: PODIATRIST

## 2021-01-01 PROCEDURE — 94760 N-INVAS EAR/PLS OXIMETRY 1: CPT

## 2021-01-01 PROCEDURE — 74011000258 HC RX REV CODE- 258: Performed by: SURGERY

## 2021-01-01 PROCEDURE — 76770 US EXAM ABDO BACK WALL COMP: CPT

## 2021-01-01 PROCEDURE — 87075 CULTR BACTERIA EXCEPT BLOOD: CPT

## 2021-01-01 PROCEDURE — C9113 INJ PANTOPRAZOLE SODIUM, VIA: HCPCS | Performed by: GENERAL ACUTE CARE HOSPITAL

## 2021-01-01 PROCEDURE — 85027 COMPLETE CBC AUTOMATED: CPT

## 2021-01-01 PROCEDURE — 74011000250 HC RX REV CODE- 250: Performed by: ANESTHESIOLOGY

## 2021-01-01 PROCEDURE — 76210000006 HC OR PH I REC 0.5 TO 1 HR: Performed by: SURGERY

## 2021-01-01 PROCEDURE — 85014 HEMATOCRIT: CPT

## 2021-01-01 PROCEDURE — 74011250636 HC RX REV CODE- 250/636: Performed by: ANESTHESIOLOGY

## 2021-01-01 PROCEDURE — 74011250636 HC RX REV CODE- 250/636: Performed by: PODIATRIST

## 2021-01-01 PROCEDURE — 97116 GAIT TRAINING THERAPY: CPT | Performed by: PHYSICAL THERAPIST

## 2021-01-01 PROCEDURE — 93280 PM DEVICE PROGR EVAL DUAL: CPT | Performed by: INTERNAL MEDICINE

## 2021-01-01 PROCEDURE — 77030041076 HC DRSG AG OPTICELL MDII -A

## 2021-01-01 PROCEDURE — 76060000033 HC ANESTHESIA 1 TO 1.5 HR: Performed by: PODIATRIST

## 2021-01-01 PROCEDURE — G8536 NO DOC ELDER MAL SCRN: HCPCS | Performed by: NURSE PRACTITIONER

## 2021-01-01 PROCEDURE — 97535 SELF CARE MNGMENT TRAINING: CPT

## 2021-01-01 PROCEDURE — 93926 LOWER EXTREMITY STUDY: CPT

## 2021-01-01 PROCEDURE — 99285 EMERGENCY DEPT VISIT HI MDM: CPT

## 2021-01-01 PROCEDURE — 74011250637 HC RX REV CODE- 250/637: Performed by: NURSE PRACTITIONER

## 2021-01-01 PROCEDURE — 74011250636 HC RX REV CODE- 250/636: Performed by: EMERGENCY MEDICINE

## 2021-01-01 PROCEDURE — 88307 TISSUE EXAM BY PATHOLOGIST: CPT

## 2021-01-01 PROCEDURE — 99214 OFFICE O/P EST MOD 30 MIN: CPT | Performed by: NURSE PRACTITIONER

## 2021-01-01 PROCEDURE — 0DB78ZX EXCISION OF STOMACH, PYLORUS, VIA NATURAL OR ARTIFICIAL OPENING ENDOSCOPIC, DIAGNOSTIC: ICD-10-PCS | Performed by: INTERNAL MEDICINE

## 2021-01-01 PROCEDURE — 77010033678 HC OXYGEN DAILY

## 2021-01-01 PROCEDURE — 99284 EMERGENCY DEPT VISIT MOD MDM: CPT

## 2021-01-01 PROCEDURE — 77030000032 HC CUF TRNQT ZIMM -B: Performed by: PODIATRIST

## 2021-01-01 PROCEDURE — 84145 PROCALCITONIN (PCT): CPT

## 2021-01-01 PROCEDURE — 93005 ELECTROCARDIOGRAM TRACING: CPT

## 2021-01-01 PROCEDURE — 74011250636 HC RX REV CODE- 250/636: Performed by: NURSE ANESTHETIST, CERTIFIED REGISTERED

## 2021-01-01 PROCEDURE — 77030039825 HC MSK NSL PAP SUPERNO2VA VYRM -B: Performed by: ANESTHESIOLOGY

## 2021-01-01 PROCEDURE — 2709999900 HC NON-CHARGEABLE SUPPLY: Performed by: PODIATRIST

## 2021-01-01 PROCEDURE — 77030002916 HC SUT ETHLN J&J -A: Performed by: PODIATRIST

## 2021-01-01 PROCEDURE — G8420 CALC BMI NORM PARAMETERS: HCPCS | Performed by: NURSE PRACTITIONER

## 2021-01-01 PROCEDURE — 74011000258 HC RX REV CODE- 258: Performed by: PODIATRIST

## 2021-01-01 PROCEDURE — 74011000258 HC RX REV CODE- 258: Performed by: EMERGENCY MEDICINE

## 2021-01-01 PROCEDURE — 97165 OT EVAL LOW COMPLEX 30 MIN: CPT

## 2021-01-01 PROCEDURE — 93005 ELECTROCARDIOGRAM TRACING: CPT | Performed by: NURSE PRACTITIONER

## 2021-01-01 PROCEDURE — 85018 HEMOGLOBIN: CPT

## 2021-01-01 PROCEDURE — 0Y6M0Z9 DETACHMENT AT RIGHT FOOT, PARTIAL 1ST RAY, OPEN APPROACH: ICD-10-PCS | Performed by: PODIATRIST

## 2021-01-01 PROCEDURE — 77030038692 HC WND DEB SYS IRMX -B: Performed by: SURGERY

## 2021-01-01 PROCEDURE — 93296 REM INTERROG EVL PM/IDS: CPT | Performed by: INTERNAL MEDICINE

## 2021-01-01 PROCEDURE — 87635 SARS-COV-2 COVID-19 AMP PRB: CPT

## 2021-01-01 PROCEDURE — 77030038554 HC DRSG WND THERAHONEY MDII -Z

## 2021-01-01 PROCEDURE — P9016 RBC LEUKOCYTES REDUCED: HCPCS

## 2021-01-01 PROCEDURE — 73630 X-RAY EXAM OF FOOT: CPT

## 2021-01-01 PROCEDURE — 0Y6M0ZB DETACHMENT AT RIGHT FOOT, PARTIAL 2ND RAY, OPEN APPROACH: ICD-10-PCS | Performed by: PODIATRIST

## 2021-01-01 PROCEDURE — 74011250637 HC RX REV CODE- 250/637: Performed by: SURGERY

## 2021-01-01 PROCEDURE — 74176 CT ABD & PELVIS W/O CONTRAST: CPT

## 2021-01-01 PROCEDURE — 77030011253 HC DRSG HYDRGEL MDII -B

## 2021-01-01 PROCEDURE — 76210000016 HC OR PH I REC 1 TO 1.5 HR: Performed by: PODIATRIST

## 2021-01-01 PROCEDURE — 97116 GAIT TRAINING THERAPY: CPT

## 2021-01-01 PROCEDURE — 36430 TRANSFUSION BLD/BLD COMPNT: CPT

## 2021-01-01 PROCEDURE — 74011000250 HC RX REV CODE- 250: Performed by: INTERNAL MEDICINE

## 2021-01-01 PROCEDURE — 80053 COMPREHEN METABOLIC PANEL: CPT

## 2021-01-01 PROCEDURE — 82728 ASSAY OF FERRITIN: CPT

## 2021-01-01 PROCEDURE — 93010 ELECTROCARDIOGRAM REPORT: CPT | Performed by: NURSE PRACTITIONER

## 2021-01-01 PROCEDURE — 94762 N-INVAS EAR/PLS OXIMTRY CONT: CPT

## 2021-01-01 PROCEDURE — 0Y6M0ZC DETACHMENT AT RIGHT FOOT, PARTIAL 3RD RAY, OPEN APPROACH: ICD-10-PCS | Performed by: PODIATRIST

## 2021-01-01 PROCEDURE — 88311 DECALCIFY TISSUE: CPT

## 2021-01-01 PROCEDURE — 87186 SC STD MICRODIL/AGAR DIL: CPT

## 2021-01-01 PROCEDURE — 81001 URINALYSIS AUTO W/SCOPE: CPT

## 2021-01-01 PROCEDURE — 84300 ASSAY OF URINE SODIUM: CPT

## 2021-01-01 PROCEDURE — G0463 HOSPITAL OUTPT CLINIC VISIT: HCPCS | Performed by: NURSE PRACTITIONER

## 2021-01-01 PROCEDURE — 87077 CULTURE AEROBIC IDENTIFY: CPT

## 2021-01-01 PROCEDURE — 93294 REM INTERROG EVL PM/LDLS PM: CPT | Performed by: INTERNAL MEDICINE

## 2021-01-01 PROCEDURE — 88305 TISSUE EXAM BY PATHOLOGIST: CPT

## 2021-01-01 PROCEDURE — 97161 PT EVAL LOW COMPLEX 20 MIN: CPT

## 2021-01-01 PROCEDURE — G8432 DEP SCR NOT DOC, RNG: HCPCS | Performed by: NURSE PRACTITIONER

## 2021-01-01 PROCEDURE — 1101F PT FALLS ASSESS-DOCD LE1/YR: CPT | Performed by: NURSE PRACTITIONER

## 2021-01-01 PROCEDURE — 77030031139 HC SUT VCRL2 J&J -A: Performed by: PODIATRIST

## 2021-01-01 PROCEDURE — 97110 THERAPEUTIC EXERCISES: CPT

## 2021-01-01 PROCEDURE — 2709999900 HC NON-CHARGEABLE SUPPLY: Performed by: SURGERY

## 2021-01-01 PROCEDURE — 97161 PT EVAL LOW COMPLEX 20 MIN: CPT | Performed by: PHYSICAL THERAPIST

## 2021-01-01 PROCEDURE — 74011000250 HC RX REV CODE- 250: Performed by: SURGERY

## 2021-01-01 PROCEDURE — 87040 BLOOD CULTURE FOR BACTERIA: CPT

## 2021-01-01 PROCEDURE — 90686 IIV4 VACC NO PRSV 0.5 ML IM: CPT | Performed by: GENERAL ACUTE CARE HOSPITAL

## 2021-01-01 PROCEDURE — 77030040718 HC DRSG HYDRGEL SKINTEGRITY MDII -A

## 2021-01-01 PROCEDURE — 93293 PM PHONE R-STRIP DEVICE EVAL: CPT

## 2021-01-01 PROCEDURE — 99283 EMERGENCY DEPT VISIT LOW MDM: CPT

## 2021-01-01 PROCEDURE — 76210000021 HC REC RM PH II 0.5 TO 1 HR: Performed by: SURGERY

## 2021-01-01 PROCEDURE — G8427 DOCREV CUR MEDS BY ELIG CLIN: HCPCS | Performed by: NURSE PRACTITIONER

## 2021-01-01 PROCEDURE — U0005 INFEC AGEN DETEC AMPLI PROBE: HCPCS

## 2021-01-01 PROCEDURE — 74011000250 HC RX REV CODE- 250: Performed by: PODIATRIST

## 2021-01-01 PROCEDURE — 93306 TTE W/DOPPLER COMPLETE: CPT | Performed by: INTERNAL MEDICINE

## 2021-01-01 PROCEDURE — 87185 SC STD ENZYME DETCJ PER NZM: CPT

## 2021-01-01 PROCEDURE — 90471 IMMUNIZATION ADMIN: CPT

## 2021-01-01 PROCEDURE — 71046 X-RAY EXAM CHEST 2 VIEWS: CPT

## 2021-01-01 PROCEDURE — 87086 URINE CULTURE/COLONY COUNT: CPT

## 2021-01-01 RX ORDER — DEXTROMETHORPHAN/PSEUDOEPHED 2.5-7.5/.8
1.2 DROPS ORAL
Status: DISCONTINUED | OUTPATIENT
Start: 2021-01-01 | End: 2021-01-01 | Stop reason: HOSPADM

## 2021-01-01 RX ORDER — FENTANYL CITRATE 50 UG/ML
INJECTION, SOLUTION INTRAMUSCULAR; INTRAVENOUS AS NEEDED
Status: DISCONTINUED | OUTPATIENT
Start: 2021-01-01 | End: 2021-01-01 | Stop reason: HOSPADM

## 2021-01-01 RX ORDER — SUCRALFATE 1 G/1
1 TABLET ORAL 4 TIMES DAILY
Qty: 120 TABLET | Refills: 0 | Status: SHIPPED | OUTPATIENT
Start: 2021-01-01

## 2021-01-01 RX ORDER — POLYETHYLENE GLYCOL 3350 17 G/17G
17 POWDER, FOR SOLUTION ORAL DAILY PRN
Status: DISCONTINUED | OUTPATIENT
Start: 2021-01-01 | End: 2021-01-01 | Stop reason: HOSPADM

## 2021-01-01 RX ORDER — SODIUM CHLORIDE, SODIUM LACTATE, POTASSIUM CHLORIDE, CALCIUM CHLORIDE 600; 310; 30; 20 MG/100ML; MG/100ML; MG/100ML; MG/100ML
25 INJECTION, SOLUTION INTRAVENOUS CONTINUOUS
Status: DISCONTINUED | OUTPATIENT
Start: 2021-01-01 | End: 2021-01-01 | Stop reason: HOSPADM

## 2021-01-01 RX ORDER — SODIUM CHLORIDE 450 MG/100ML
65 INJECTION, SOLUTION INTRAVENOUS CONTINUOUS
Status: DISCONTINUED | OUTPATIENT
Start: 2021-01-01 | End: 2021-01-01

## 2021-01-01 RX ORDER — SODIUM CHLORIDE 0.9 % (FLUSH) 0.9 %
5-40 SYRINGE (ML) INJECTION AS NEEDED
Status: DISCONTINUED | OUTPATIENT
Start: 2021-01-01 | End: 2021-01-01 | Stop reason: HOSPADM

## 2021-01-01 RX ORDER — ATROPINE SULFATE 0.1 MG/ML
0.5 INJECTION INTRAVENOUS
Status: DISCONTINUED | OUTPATIENT
Start: 2021-01-01 | End: 2021-01-01 | Stop reason: HOSPADM

## 2021-01-01 RX ORDER — PROPOFOL 10 MG/ML
INJECTION, EMULSION INTRAVENOUS AS NEEDED
Status: DISCONTINUED | OUTPATIENT
Start: 2021-01-01 | End: 2021-01-01 | Stop reason: HOSPADM

## 2021-01-01 RX ORDER — SODIUM CHLORIDE 9 MG/ML
250 INJECTION, SOLUTION INTRAVENOUS AS NEEDED
Status: DISCONTINUED | OUTPATIENT
Start: 2021-01-01 | End: 2021-01-01 | Stop reason: HOSPADM

## 2021-01-01 RX ORDER — ONDANSETRON 2 MG/ML
4 INJECTION INTRAMUSCULAR; INTRAVENOUS
Status: DISCONTINUED | OUTPATIENT
Start: 2021-01-01 | End: 2021-01-01 | Stop reason: HOSPADM

## 2021-01-01 RX ORDER — ONDANSETRON 2 MG/ML
INJECTION INTRAMUSCULAR; INTRAVENOUS AS NEEDED
Status: DISCONTINUED | OUTPATIENT
Start: 2021-01-01 | End: 2021-01-01 | Stop reason: HOSPADM

## 2021-01-01 RX ORDER — DIPHENHYDRAMINE HYDROCHLORIDE 50 MG/ML
12.5 INJECTION, SOLUTION INTRAMUSCULAR; INTRAVENOUS AS NEEDED
Status: DISCONTINUED | OUTPATIENT
Start: 2021-01-01 | End: 2021-01-01 | Stop reason: HOSPADM

## 2021-01-01 RX ORDER — EPINEPHRINE 0.1 MG/ML
1 INJECTION INTRACARDIAC; INTRAVENOUS
Status: DISCONTINUED | OUTPATIENT
Start: 2021-01-01 | End: 2021-01-01 | Stop reason: HOSPADM

## 2021-01-01 RX ORDER — ACETAMINOPHEN 650 MG/1
650 SUPPOSITORY RECTAL
Status: DISCONTINUED | OUTPATIENT
Start: 2021-01-01 | End: 2021-01-01 | Stop reason: HOSPADM

## 2021-01-01 RX ORDER — BALSAM PERU/CASTOR OIL
OINTMENT (GRAM) TOPICAL 2 TIMES DAILY
Status: DISCONTINUED | OUTPATIENT
Start: 2021-01-01 | End: 2021-01-01 | Stop reason: HOSPADM

## 2021-01-01 RX ORDER — LIDOCAINE HYDROCHLORIDE 10 MG/ML
0.1 INJECTION, SOLUTION EPIDURAL; INFILTRATION; INTRACAUDAL; PERINEURAL AS NEEDED
Status: DISCONTINUED | OUTPATIENT
Start: 2021-01-01 | End: 2021-01-01 | Stop reason: HOSPADM

## 2021-01-01 RX ORDER — SODIUM CHLORIDE 9 MG/ML
100 INJECTION, SOLUTION INTRAVENOUS ONCE
Status: COMPLETED | OUTPATIENT
Start: 2021-01-01 | End: 2021-01-01

## 2021-01-01 RX ORDER — NYSTATIN 100000 [USP'U]/ML
500000 SUSPENSION ORAL 4 TIMES DAILY
Status: DISCONTINUED | OUTPATIENT
Start: 2021-01-01 | End: 2021-01-01 | Stop reason: HOSPADM

## 2021-01-01 RX ORDER — PROPOFOL 10 MG/ML
INJECTION, EMULSION INTRAVENOUS
Status: DISCONTINUED | OUTPATIENT
Start: 2021-01-01 | End: 2021-01-01 | Stop reason: HOSPADM

## 2021-01-01 RX ORDER — ASPIRIN 81 MG/1
81 TABLET ORAL DAILY
Status: DISCONTINUED | OUTPATIENT
Start: 2021-01-01 | End: 2021-01-01 | Stop reason: HOSPADM

## 2021-01-01 RX ORDER — NALOXONE HYDROCHLORIDE 0.4 MG/ML
0.4 INJECTION, SOLUTION INTRAMUSCULAR; INTRAVENOUS; SUBCUTANEOUS
Status: DISCONTINUED | OUTPATIENT
Start: 2021-01-01 | End: 2021-01-01 | Stop reason: HOSPADM

## 2021-01-01 RX ORDER — TRAMADOL HYDROCHLORIDE 50 MG/1
50 TABLET ORAL
COMMUNITY
End: 2021-01-01

## 2021-01-01 RX ORDER — HYDRALAZINE HYDROCHLORIDE 20 MG/ML
10 INJECTION INTRAMUSCULAR; INTRAVENOUS
Status: DISCONTINUED | OUTPATIENT
Start: 2021-01-01 | End: 2021-01-01 | Stop reason: HOSPADM

## 2021-01-01 RX ORDER — LIDOCAINE HYDROCHLORIDE 20 MG/ML
JELLY TOPICAL ONCE
Status: DISPENSED | OUTPATIENT
Start: 2021-01-01 | End: 2021-01-01

## 2021-01-01 RX ORDER — PRASUGREL 10 MG/1
10 TABLET, FILM COATED ORAL DAILY
COMMUNITY
Start: 2021-01-01 | End: 2021-01-01

## 2021-01-01 RX ORDER — POLYETHYLENE GLYCOL 3350 17 G/17G
17 POWDER, FOR SOLUTION ORAL 2 TIMES DAILY
Status: DISCONTINUED | OUTPATIENT
Start: 2021-01-01 | End: 2021-01-01 | Stop reason: HOSPADM

## 2021-01-01 RX ORDER — FLUMAZENIL 0.1 MG/ML
0.2 INJECTION INTRAVENOUS
Status: DISCONTINUED | OUTPATIENT
Start: 2021-01-01 | End: 2021-01-01 | Stop reason: HOSPADM

## 2021-01-01 RX ORDER — LIDOCAINE HYDROCHLORIDE 10 MG/ML
INJECTION INFILTRATION; PERINEURAL AS NEEDED
Status: DISCONTINUED | OUTPATIENT
Start: 2021-01-01 | End: 2021-01-01 | Stop reason: HOSPADM

## 2021-01-01 RX ORDER — SODIUM CHLORIDE 9 MG/ML
75 INJECTION, SOLUTION INTRAVENOUS CONTINUOUS
Status: DISCONTINUED | OUTPATIENT
Start: 2021-01-01 | End: 2021-01-01

## 2021-01-01 RX ORDER — HYDROMORPHONE HYDROCHLORIDE 2 MG/1
1 TABLET ORAL
Qty: 10 TABLET | Refills: 0 | Status: SHIPPED | OUTPATIENT
Start: 2021-01-01 | End: 2021-01-01

## 2021-01-01 RX ORDER — METOPROLOL TARTRATE 25 MG/1
25 TABLET, FILM COATED ORAL DAILY
Status: DISCONTINUED | OUTPATIENT
Start: 2021-01-01 | End: 2021-01-01 | Stop reason: HOSPADM

## 2021-01-01 RX ORDER — MORPHINE SULFATE 2 MG/ML
1 INJECTION, SOLUTION INTRAMUSCULAR; INTRAVENOUS ONCE
Status: COMPLETED | OUTPATIENT
Start: 2021-01-01 | End: 2021-01-01

## 2021-01-01 RX ORDER — VANCOMYCIN/0.9 % SOD CHLORIDE 1.5G/250ML
1500 PLASTIC BAG, INJECTION (ML) INTRAVENOUS
Status: COMPLETED | OUTPATIENT
Start: 2021-01-01 | End: 2021-01-01

## 2021-01-01 RX ORDER — LANOLIN ALCOHOL/MO/W.PET/CERES
6 CREAM (GRAM) TOPICAL
Status: DISCONTINUED | OUTPATIENT
Start: 2021-01-01 | End: 2021-01-01 | Stop reason: HOSPADM

## 2021-01-01 RX ORDER — SODIUM CHLORIDE 0.9 % (FLUSH) 0.9 %
5-40 SYRINGE (ML) INJECTION EVERY 8 HOURS
Status: DISCONTINUED | OUTPATIENT
Start: 2021-01-01 | End: 2021-01-01 | Stop reason: HOSPADM

## 2021-01-01 RX ORDER — DIPHENHYDRAMINE HYDROCHLORIDE 50 MG/ML
12.5 INJECTION, SOLUTION INTRAMUSCULAR; INTRAVENOUS AS NEEDED
Status: ACTIVE | OUTPATIENT
Start: 2021-01-01 | End: 2021-01-01

## 2021-01-01 RX ORDER — DEXMEDETOMIDINE HYDROCHLORIDE 100 UG/ML
INJECTION, SOLUTION INTRAVENOUS AS NEEDED
Status: DISCONTINUED | OUTPATIENT
Start: 2021-01-01 | End: 2021-01-01 | Stop reason: HOSPADM

## 2021-01-01 RX ORDER — MORPHINE SULFATE 2 MG/ML
2 INJECTION, SOLUTION INTRAMUSCULAR; INTRAVENOUS
Status: COMPLETED | OUTPATIENT
Start: 2021-01-01 | End: 2021-01-01

## 2021-01-01 RX ORDER — SODIUM CHLORIDE 0.9 % (FLUSH) 0.9 %
5-40 SYRINGE (ML) INJECTION EVERY 8 HOURS
Status: DISCONTINUED | OUTPATIENT
Start: 2021-01-01 | End: 2021-01-01 | Stop reason: SDUPTHER

## 2021-01-01 RX ORDER — SODIUM CHLORIDE 0.9 % (FLUSH) 0.9 %
5-40 SYRINGE (ML) INJECTION AS NEEDED
Status: DISCONTINUED | OUTPATIENT
Start: 2021-01-01 | End: 2021-01-01 | Stop reason: SDUPTHER

## 2021-01-01 RX ORDER — LANOLIN ALCOHOL/MO/W.PET/CERES
6 CREAM (GRAM) TOPICAL
Qty: 180 TABLET | Refills: 3 | Status: SHIPPED
Start: 2021-01-01

## 2021-01-01 RX ORDER — FENTANYL CITRATE 50 UG/ML
25 INJECTION, SOLUTION INTRAMUSCULAR; INTRAVENOUS
Status: DISCONTINUED | OUTPATIENT
Start: 2021-01-01 | End: 2021-01-01 | Stop reason: HOSPADM

## 2021-01-01 RX ORDER — FINASTERIDE 5 MG/1
5 TABLET, FILM COATED ORAL DAILY
Status: DISCONTINUED | OUTPATIENT
Start: 2021-01-01 | End: 2021-01-01 | Stop reason: HOSPADM

## 2021-01-01 RX ORDER — LEVOFLOXACIN 500 MG/1
500 TABLET, FILM COATED ORAL DAILY
COMMUNITY
End: 2021-01-01

## 2021-01-01 RX ORDER — NYSTATIN 100000 [USP'U]/ML
500000 SUSPENSION ORAL 4 TIMES DAILY
Qty: 600 ML | Refills: 0 | Status: SHIPPED | OUTPATIENT
Start: 2021-01-01

## 2021-01-01 RX ORDER — SODIUM CHLORIDE 9 MG/ML
25 INJECTION, SOLUTION INTRAVENOUS CONTINUOUS
Status: DISPENSED | OUTPATIENT
Start: 2021-01-01 | End: 2021-01-01

## 2021-01-01 RX ORDER — ACETAMINOPHEN 325 MG/1
650 TABLET ORAL
Status: DISCONTINUED | OUTPATIENT
Start: 2021-01-01 | End: 2021-01-01 | Stop reason: HOSPADM

## 2021-01-01 RX ORDER — OXYCODONE HYDROCHLORIDE 5 MG/1
5 TABLET ORAL
Status: DISCONTINUED | OUTPATIENT
Start: 2021-01-01 | End: 2021-01-01 | Stop reason: HOSPADM

## 2021-01-01 RX ORDER — AMOXICILLIN AND CLAVULANATE POTASSIUM 500; 125 MG/1; MG/1
1 TABLET, FILM COATED ORAL 2 TIMES DAILY
Qty: 14 TABLET | Refills: 0 | Status: SHIPPED
Start: 2021-01-01 | End: 2021-01-01

## 2021-01-01 RX ORDER — HYDROMORPHONE HYDROCHLORIDE 1 MG/ML
0.2 INJECTION, SOLUTION INTRAMUSCULAR; INTRAVENOUS; SUBCUTANEOUS
Status: DISCONTINUED | OUTPATIENT
Start: 2021-01-01 | End: 2021-01-01 | Stop reason: HOSPADM

## 2021-01-01 RX ORDER — DEXAMETHASONE SODIUM PHOSPHATE 4 MG/ML
INJECTION, SOLUTION INTRA-ARTICULAR; INTRALESIONAL; INTRAMUSCULAR; INTRAVENOUS; SOFT TISSUE AS NEEDED
Status: DISCONTINUED | OUTPATIENT
Start: 2021-01-01 | End: 2021-01-01 | Stop reason: HOSPADM

## 2021-01-01 RX ORDER — FUROSEMIDE 10 MG/ML
40 INJECTION INTRAMUSCULAR; INTRAVENOUS ONCE
Status: COMPLETED | OUTPATIENT
Start: 2021-01-01 | End: 2021-01-01

## 2021-01-01 RX ORDER — BUPIVACAINE HYDROCHLORIDE 5 MG/ML
INJECTION, SOLUTION EPIDURAL; INTRACAUDAL AS NEEDED
Status: DISCONTINUED | OUTPATIENT
Start: 2021-01-01 | End: 2021-01-01 | Stop reason: HOSPADM

## 2021-01-01 RX ORDER — ONDANSETRON 4 MG/1
4 TABLET, ORALLY DISINTEGRATING ORAL
Status: DISCONTINUED | OUTPATIENT
Start: 2021-01-01 | End: 2021-01-01 | Stop reason: HOSPADM

## 2021-01-01 RX ORDER — SODIUM CHLORIDE, SODIUM LACTATE, POTASSIUM CHLORIDE, CALCIUM CHLORIDE 600; 310; 30; 20 MG/100ML; MG/100ML; MG/100ML; MG/100ML
25 INJECTION, SOLUTION INTRAVENOUS CONTINUOUS
Status: DISCONTINUED | OUTPATIENT
Start: 2021-01-01 | End: 2021-01-01

## 2021-01-01 RX ORDER — DEXMEDETOMIDINE HYDROCHLORIDE 100 UG/ML
INJECTION, SOLUTION INTRAVENOUS AS NEEDED
Status: DISCONTINUED | OUTPATIENT
Start: 2021-01-01 | End: 2021-01-01

## 2021-01-01 RX ORDER — LIDOCAINE HYDROCHLORIDE 20 MG/ML
INJECTION, SOLUTION EPIDURAL; INFILTRATION; INTRACAUDAL; PERINEURAL AS NEEDED
Status: DISCONTINUED | OUTPATIENT
Start: 2021-01-01 | End: 2021-01-01 | Stop reason: HOSPADM

## 2021-01-01 RX ORDER — HYDROMORPHONE HYDROCHLORIDE 2 MG/1
1 TABLET ORAL
Status: DISCONTINUED | OUTPATIENT
Start: 2021-01-01 | End: 2021-01-01 | Stop reason: HOSPADM

## 2021-01-01 RX ORDER — HYDROMORPHONE HYDROCHLORIDE 1 MG/ML
1 INJECTION, SOLUTION INTRAMUSCULAR; INTRAVENOUS; SUBCUTANEOUS ONCE
Status: COMPLETED | OUTPATIENT
Start: 2021-01-01 | End: 2021-01-01

## 2021-01-01 RX ORDER — HEPARIN SODIUM 5000 [USP'U]/ML
5000 INJECTION, SOLUTION INTRAVENOUS; SUBCUTANEOUS EVERY 8 HOURS
Status: DISCONTINUED | OUTPATIENT
Start: 2021-01-01 | End: 2021-01-01 | Stop reason: HOSPADM

## 2021-01-01 RX ORDER — PANTOPRAZOLE SODIUM 40 MG/1
40 TABLET, DELAYED RELEASE ORAL 2 TIMES DAILY
Qty: 60 TABLET | Refills: 1 | Status: SHIPPED | OUTPATIENT
Start: 2021-01-01

## 2021-01-01 RX ORDER — ASPIRIN 81 MG/1
81 TABLET ORAL DAILY
Qty: 90 TABLET | Refills: 3 | Status: SHIPPED
Start: 2021-01-01

## 2021-01-01 RX ORDER — CHOLECALCIFEROL (VITAMIN D3) 50 MCG
CAPSULE ORAL AS NEEDED
COMMUNITY
End: 2021-01-01

## 2021-01-01 RX ORDER — SUCRALFATE 1 G/1
1 TABLET ORAL
Status: DISCONTINUED | OUTPATIENT
Start: 2021-01-01 | End: 2021-01-01 | Stop reason: HOSPADM

## 2021-01-01 RX ORDER — ACETAMINOPHEN 325 MG/1
650 TABLET ORAL EVERY 6 HOURS
Status: DISCONTINUED | OUTPATIENT
Start: 2021-01-01 | End: 2021-01-01 | Stop reason: HOSPADM

## 2021-01-01 RX ORDER — SODIUM CHLORIDE, SODIUM LACTATE, POTASSIUM CHLORIDE, CALCIUM CHLORIDE 600; 310; 30; 20 MG/100ML; MG/100ML; MG/100ML; MG/100ML
INJECTION, SOLUTION INTRAVENOUS
Status: DISCONTINUED | OUTPATIENT
Start: 2021-01-01 | End: 2021-01-01 | Stop reason: HOSPADM

## 2021-01-01 RX ORDER — DOXYCYCLINE 50 MG/1
50 CAPSULE ORAL 2 TIMES DAILY
Status: ON HOLD | COMMUNITY
End: 2021-01-01

## 2021-01-01 RX ORDER — ONDANSETRON 4 MG/1
4 TABLET, FILM COATED ORAL
COMMUNITY
End: 2021-01-01

## 2021-01-01 RX ORDER — PHENYLEPHRINE HCL IN 0.9% NACL 0.4MG/10ML
SYRINGE (ML) INTRAVENOUS
Status: DISCONTINUED | OUTPATIENT
Start: 2021-01-01 | End: 2021-01-01 | Stop reason: HOSPADM

## 2021-01-01 RX ORDER — ZOLPIDEM TARTRATE 5 MG/1
5 TABLET ORAL
COMMUNITY
End: 2021-01-01

## 2021-01-01 RX ORDER — TRAMADOL HYDROCHLORIDE 50 MG/1
50 TABLET ORAL
Status: DISCONTINUED | OUTPATIENT
Start: 2021-01-01 | End: 2021-01-01 | Stop reason: HOSPADM

## 2021-01-01 RX ADMIN — MELATONIN 6 MG: at 22:14

## 2021-01-01 RX ADMIN — VANCOMYCIN HYDROCHLORIDE 500 MG: 500 INJECTION, POWDER, LYOPHILIZED, FOR SOLUTION INTRAVENOUS at 05:32

## 2021-01-01 RX ADMIN — ACETAMINOPHEN 650 MG: 325 TABLET ORAL at 06:19

## 2021-01-01 RX ADMIN — Medication 10 ML: at 11:32

## 2021-01-01 RX ADMIN — HYDROMORPHONE HYDROCHLORIDE 1 MG: 2 TABLET ORAL at 23:42

## 2021-01-01 RX ADMIN — ACETAMINOPHEN 650 MG: 325 TABLET ORAL at 17:13

## 2021-01-01 RX ADMIN — CEFEPIME HYDROCHLORIDE 2 G: 2 INJECTION, POWDER, FOR SOLUTION INTRAVENOUS at 13:42

## 2021-01-01 RX ADMIN — SODIUM CHLORIDE 40 MCG: 900 INJECTION, SOLUTION INTRAVENOUS at 08:37

## 2021-01-01 RX ADMIN — SODIUM CHLORIDE, POTASSIUM CHLORIDE, SODIUM LACTATE AND CALCIUM CHLORIDE 25 ML/HR: 600; 310; 30; 20 INJECTION, SOLUTION INTRAVENOUS at 06:51

## 2021-01-01 RX ADMIN — HYDROMORPHONE HYDROCHLORIDE 1 MG: 2 TABLET ORAL at 10:09

## 2021-01-01 RX ADMIN — Medication 10 ML: at 03:27

## 2021-01-01 RX ADMIN — Medication 20 MCG/MIN: at 08:40

## 2021-01-01 RX ADMIN — ASPIRIN 81 MG: 81 TABLET, COATED ORAL at 09:25

## 2021-01-01 RX ADMIN — Medication: at 09:14

## 2021-01-01 RX ADMIN — METOPROLOL TARTRATE 25 MG: 25 TABLET, FILM COATED ORAL at 09:32

## 2021-01-01 RX ADMIN — ACETAMINOPHEN 650 MG: 325 TABLET ORAL at 18:13

## 2021-01-01 RX ADMIN — Medication 1 CAPSULE: at 08:24

## 2021-01-01 RX ADMIN — CEFEPIME HYDROCHLORIDE 2 G: 2 INJECTION, POWDER, FOR SOLUTION INTRAVENOUS at 15:10

## 2021-01-01 RX ADMIN — Medication 10 ML: at 03:03

## 2021-01-01 RX ADMIN — ASPIRIN 81 MG: 81 TABLET, COATED ORAL at 10:08

## 2021-01-01 RX ADMIN — MORPHINE SULFATE 2 MG: 2 INJECTION, SOLUTION INTRAMUSCULAR; INTRAVENOUS at 12:11

## 2021-01-01 RX ADMIN — HYDROMORPHONE HYDROCHLORIDE 1 MG: 2 TABLET ORAL at 04:26

## 2021-01-01 RX ADMIN — NYSTATIN 500000 UNITS: 100000 SUSPENSION ORAL at 13:23

## 2021-01-01 RX ADMIN — ASPIRIN 81 MG: 81 TABLET, COATED ORAL at 09:14

## 2021-01-01 RX ADMIN — ACETAMINOPHEN 650 MG: 325 TABLET ORAL at 11:39

## 2021-01-01 RX ADMIN — DEXMEDETOMIDINE HYDROCHLORIDE 2 MCG: 100 INJECTION, SOLUTION, CONCENTRATE INTRAVENOUS at 07:50

## 2021-01-01 RX ADMIN — Medication 10 ML: at 13:01

## 2021-01-01 RX ADMIN — ASPIRIN 81 MG: 81 TABLET, COATED ORAL at 10:30

## 2021-01-01 RX ADMIN — BENZOCAINE 2 SPRAY: 200 SPRAY DENTAL; ORAL; PERIODONTAL at 09:48

## 2021-01-01 RX ADMIN — VANCOMYCIN HYDROCHLORIDE 500 MG: 500 INJECTION, POWDER, LYOPHILIZED, FOR SOLUTION INTRAVENOUS at 11:27

## 2021-01-01 RX ADMIN — Medication 10 ML: at 05:53

## 2021-01-01 RX ADMIN — Medication 10 ML: at 13:42

## 2021-01-01 RX ADMIN — ACETAMINOPHEN 650 MG: 325 TABLET ORAL at 11:17

## 2021-01-01 RX ADMIN — NYSTATIN 500000 UNITS: 100000 SUSPENSION ORAL at 12:20

## 2021-01-01 RX ADMIN — AMPICILLIN SODIUM 1 G: 1 INJECTION, POWDER, FOR SOLUTION INTRAMUSCULAR; INTRAVENOUS at 15:10

## 2021-01-01 RX ADMIN — AMPICILLIN SODIUM 2 G: 2 INJECTION, POWDER, FOR SOLUTION INTRAMUSCULAR; INTRAVENOUS at 04:10

## 2021-01-01 RX ADMIN — OXYCODONE 5 MG: 5 TABLET ORAL at 00:53

## 2021-01-01 RX ADMIN — POLYETHYLENE GLYCOL 3350 17 G: 17 POWDER, FOR SOLUTION ORAL at 18:02

## 2021-01-01 RX ADMIN — Medication: at 18:02

## 2021-01-01 RX ADMIN — SODIUM CHLORIDE 25 ML/HR: 9 INJECTION, SOLUTION INTRAVENOUS at 09:38

## 2021-01-01 RX ADMIN — ACETAMINOPHEN 650 MG: 325 TABLET ORAL at 23:34

## 2021-01-01 RX ADMIN — Medication 1 AMPULE: at 09:15

## 2021-01-01 RX ADMIN — DEXMEDETOMIDINE HYDROCHLORIDE 4 MCG: 100 INJECTION, SOLUTION, CONCENTRATE INTRAVENOUS at 07:47

## 2021-01-01 RX ADMIN — NYSTATIN 500000 UNITS: 100000 SUSPENSION ORAL at 21:08

## 2021-01-01 RX ADMIN — HEPARIN SODIUM 5000 UNITS: 5000 INJECTION INTRAVENOUS; SUBCUTANEOUS at 05:57

## 2021-01-01 RX ADMIN — MELATONIN 6 MG: at 21:27

## 2021-01-01 RX ADMIN — SODIUM CHLORIDE 40 MG: 9 INJECTION INTRAMUSCULAR; INTRAVENOUS; SUBCUTANEOUS at 08:29

## 2021-01-01 RX ADMIN — Medication 10 ML: at 05:02

## 2021-01-01 RX ADMIN — ACETAMINOPHEN 650 MG: 325 TABLET ORAL at 23:28

## 2021-01-01 RX ADMIN — PROPOFOL 15 MG: 10 INJECTION, EMULSION INTRAVENOUS at 09:59

## 2021-01-01 RX ADMIN — HYDROMORPHONE HYDROCHLORIDE 1 MG: 2 TABLET ORAL at 22:16

## 2021-01-01 RX ADMIN — HEPARIN SODIUM 5000 UNITS: 5000 INJECTION INTRAVENOUS; SUBCUTANEOUS at 22:27

## 2021-01-01 RX ADMIN — SODIUM CHLORIDE 40 MG: 9 INJECTION INTRAMUSCULAR; INTRAVENOUS; SUBCUTANEOUS at 10:02

## 2021-01-01 RX ADMIN — HYDROMORPHONE HYDROCHLORIDE 1 MG: 1 INJECTION, SOLUTION INTRAMUSCULAR; INTRAVENOUS; SUBCUTANEOUS at 05:07

## 2021-01-01 RX ADMIN — MORPHINE SULFATE 1 MG: 2 INJECTION, SOLUTION INTRAMUSCULAR; INTRAVENOUS at 03:04

## 2021-01-01 RX ADMIN — FINASTERIDE 5 MG: 5 TABLET, FILM COATED ORAL at 08:01

## 2021-01-01 RX ADMIN — SODIUM CHLORIDE 75 ML/HR: 900 INJECTION, SOLUTION INTRAVENOUS at 22:35

## 2021-01-01 RX ADMIN — MELATONIN 6 MG: at 21:22

## 2021-01-01 RX ADMIN — AMPICILLIN SODIUM 1 G: 1 INJECTION, POWDER, FOR SOLUTION INTRAMUSCULAR; INTRAVENOUS at 10:47

## 2021-01-01 RX ADMIN — Medication 1 CAPSULE: at 09:32

## 2021-01-01 RX ADMIN — ACETAMINOPHEN 650 MG: 325 TABLET ORAL at 23:18

## 2021-01-01 RX ADMIN — SUCRALFATE 1 G: 1 TABLET ORAL at 11:36

## 2021-01-01 RX ADMIN — SODIUM CHLORIDE 40 MG: 9 INJECTION INTRAMUSCULAR; INTRAVENOUS; SUBCUTANEOUS at 09:13

## 2021-01-01 RX ADMIN — Medication 1 CAPSULE: at 08:52

## 2021-01-01 RX ADMIN — Medication 10 ML: at 05:09

## 2021-01-01 RX ADMIN — SODIUM CHLORIDE 100 ML/HR: 9 INJECTION, SOLUTION INTRAVENOUS at 22:28

## 2021-01-01 RX ADMIN — ACETAMINOPHEN 650 MG: 325 TABLET ORAL at 17:44

## 2021-01-01 RX ADMIN — METOPROLOL TARTRATE 25 MG: 25 TABLET, FILM COATED ORAL at 08:53

## 2021-01-01 RX ADMIN — CEFEPIME HYDROCHLORIDE 2 G: 2 INJECTION, POWDER, FOR SOLUTION INTRAVENOUS at 12:23

## 2021-01-01 RX ADMIN — SODIUM CHLORIDE 100 ML/HR: 900 INJECTION, SOLUTION INTRAVENOUS at 00:21

## 2021-01-01 RX ADMIN — METOPROLOL TARTRATE 25 MG: 25 TABLET, FILM COATED ORAL at 08:25

## 2021-01-01 RX ADMIN — HYDROMORPHONE HYDROCHLORIDE 1 MG: 2 TABLET ORAL at 20:36

## 2021-01-01 RX ADMIN — SODIUM CHLORIDE 65 ML/HR: 4.5 INJECTION, SOLUTION INTRAVENOUS at 21:16

## 2021-01-01 RX ADMIN — DEXAMETHASONE SODIUM PHOSPHATE 4 MG: 4 INJECTION, SOLUTION INTRAMUSCULAR; INTRAVENOUS at 08:29

## 2021-01-01 RX ADMIN — Medication 10 ML: at 06:01

## 2021-01-01 RX ADMIN — SUCRALFATE 1 G: 1 TABLET ORAL at 08:29

## 2021-01-01 RX ADMIN — AMPICILLIN SODIUM 2 G: 2 INJECTION, POWDER, FOR SOLUTION INTRAMUSCULAR; INTRAVENOUS at 03:47

## 2021-01-01 RX ADMIN — Medication 10 ML: at 14:00

## 2021-01-01 RX ADMIN — LIDOCAINE HYDROCHLORIDE 80 MG: 20 INJECTION, SOLUTION EPIDURAL; INFILTRATION; INTRACAUDAL; PERINEURAL at 09:49

## 2021-01-01 RX ADMIN — HYDROMORPHONE HYDROCHLORIDE 1 MG: 2 TABLET ORAL at 08:53

## 2021-01-01 RX ADMIN — ACETAMINOPHEN 650 MG: 325 TABLET ORAL at 17:24

## 2021-01-01 RX ADMIN — METOPROLOL TARTRATE 25 MG: 25 TABLET, FILM COATED ORAL at 08:01

## 2021-01-01 RX ADMIN — SODIUM CHLORIDE 65 ML/HR: 4.5 INJECTION, SOLUTION INTRAVENOUS at 04:57

## 2021-01-01 RX ADMIN — ACETAMINOPHEN 650 MG: 325 TABLET ORAL at 11:46

## 2021-01-01 RX ADMIN — Medication 10 ML: at 21:31

## 2021-01-01 RX ADMIN — CEFEPIME HYDROCHLORIDE 2 G: 2 INJECTION, POWDER, FOR SOLUTION INTRAVENOUS at 13:40

## 2021-01-01 RX ADMIN — Medication 3 AMPULE: at 07:48

## 2021-01-01 RX ADMIN — Medication 1 CAPSULE: at 10:08

## 2021-01-01 RX ADMIN — ACETAMINOPHEN 650 MG: 325 TABLET ORAL at 17:10

## 2021-01-01 RX ADMIN — FINASTERIDE 5 MG: 5 TABLET, FILM COATED ORAL at 11:29

## 2021-01-01 RX ADMIN — Medication 1 AMPULE: at 22:15

## 2021-01-01 RX ADMIN — PROPOFOL 30 MG: 10 INJECTION, EMULSION INTRAVENOUS at 07:34

## 2021-01-01 RX ADMIN — SODIUM CHLORIDE 75 ML/HR: 900 INJECTION, SOLUTION INTRAVENOUS at 11:44

## 2021-01-01 RX ADMIN — TRAMADOL HYDROCHLORIDE 50 MG: 50 TABLET, FILM COATED ORAL at 00:28

## 2021-01-01 RX ADMIN — HYDROMORPHONE HYDROCHLORIDE 1 MG: 2 TABLET ORAL at 01:25

## 2021-01-01 RX ADMIN — Medication 1 CAPSULE: at 15:10

## 2021-01-01 RX ADMIN — SUCRALFATE 1 G: 1 TABLET ORAL at 16:34

## 2021-01-01 RX ADMIN — SODIUM CHLORIDE 40 MCG: 900 INJECTION, SOLUTION INTRAVENOUS at 08:34

## 2021-01-01 RX ADMIN — CEFEPIME HYDROCHLORIDE 2 G: 2 INJECTION, POWDER, FOR SOLUTION INTRAVENOUS at 11:29

## 2021-01-01 RX ADMIN — OXYCODONE 5 MG: 5 TABLET ORAL at 02:14

## 2021-01-01 RX ADMIN — METOPROLOL TARTRATE 25 MG: 25 TABLET, FILM COATED ORAL at 09:25

## 2021-01-01 RX ADMIN — Medication 10 ML: at 02:15

## 2021-01-01 RX ADMIN — ACETAMINOPHEN 650 MG: 325 TABLET ORAL at 05:00

## 2021-01-01 RX ADMIN — ASPIRIN 81 MG: 81 TABLET, COATED ORAL at 08:53

## 2021-01-01 RX ADMIN — AMPICILLIN SODIUM 2 G: 2 INJECTION, POWDER, FOR SOLUTION INTRAMUSCULAR; INTRAVENOUS at 21:01

## 2021-01-01 RX ADMIN — SUCRALFATE 1 G: 1 TABLET ORAL at 09:42

## 2021-01-01 RX ADMIN — AMPICILLIN SODIUM 2 G: 2 INJECTION, POWDER, FOR SOLUTION INTRAMUSCULAR; INTRAVENOUS at 21:35

## 2021-01-01 RX ADMIN — AMPICILLIN SODIUM 2 G: 2 INJECTION, POWDER, FOR SOLUTION INTRAMUSCULAR; INTRAVENOUS at 13:40

## 2021-01-01 RX ADMIN — MELATONIN 6 MG: at 22:51

## 2021-01-01 RX ADMIN — ACETAMINOPHEN 650 MG: 325 TABLET ORAL at 17:57

## 2021-01-01 RX ADMIN — VANCOMYCIN HYDROCHLORIDE 500 MG: 500 INJECTION, POWDER, LYOPHILIZED, FOR SOLUTION INTRAVENOUS at 05:38

## 2021-01-01 RX ADMIN — METOPROLOL TARTRATE 25 MG: 25 TABLET, FILM COATED ORAL at 09:15

## 2021-01-01 RX ADMIN — Medication 1 AMPULE: at 08:53

## 2021-01-01 RX ADMIN — FENTANYL CITRATE 25 MCG: 50 INJECTION, SOLUTION INTRAMUSCULAR; INTRAVENOUS at 08:28

## 2021-01-01 RX ADMIN — Medication 1 AMPULE: at 23:36

## 2021-01-01 RX ADMIN — Medication 10 ML: at 21:36

## 2021-01-01 RX ADMIN — NYSTATIN 500000 UNITS: 100000 SUSPENSION ORAL at 09:41

## 2021-01-01 RX ADMIN — SUCRALFATE 1 G: 1 TABLET ORAL at 21:08

## 2021-01-01 RX ADMIN — SUCRALFATE 1 G: 1 TABLET ORAL at 21:16

## 2021-01-01 RX ADMIN — ACETAMINOPHEN 650 MG: 325 TABLET ORAL at 23:05

## 2021-01-01 RX ADMIN — ACETAMINOPHEN 650 MG: 325 TABLET ORAL at 11:29

## 2021-01-01 RX ADMIN — POLYETHYLENE GLYCOL 3350 17 G: 17 POWDER, FOR SOLUTION ORAL at 09:41

## 2021-01-01 RX ADMIN — ACETAMINOPHEN 650 MG: 325 TABLET ORAL at 23:40

## 2021-01-01 RX ADMIN — MELATONIN 6 MG: at 22:15

## 2021-01-01 RX ADMIN — ACETAMINOPHEN 650 MG: 325 TABLET ORAL at 19:35

## 2021-01-01 RX ADMIN — PROPOFOL 50 MCG/KG/MIN: 10 INJECTION, EMULSION INTRAVENOUS at 08:20

## 2021-01-01 RX ADMIN — VANCOMYCIN HYDROCHLORIDE 1500 MG: 10 INJECTION, POWDER, LYOPHILIZED, FOR SOLUTION INTRAVENOUS at 12:51

## 2021-01-01 RX ADMIN — ACETAMINOPHEN 650 MG: 325 TABLET ORAL at 08:29

## 2021-01-01 RX ADMIN — SUCRALFATE 1 G: 1 TABLET ORAL at 13:23

## 2021-01-01 RX ADMIN — ACETAMINOPHEN 650 MG: 325 TABLET ORAL at 22:57

## 2021-01-01 RX ADMIN — FINASTERIDE 5 MG: 5 TABLET, FILM COATED ORAL at 10:08

## 2021-01-01 RX ADMIN — ASPIRIN 81 MG: 81 TABLET, COATED ORAL at 09:32

## 2021-01-01 RX ADMIN — SUCRALFATE 1 G: 1 TABLET ORAL at 18:16

## 2021-01-01 RX ADMIN — SODIUM CHLORIDE 40 MG: 9 INJECTION INTRAMUSCULAR; INTRAVENOUS; SUBCUTANEOUS at 08:22

## 2021-01-01 RX ADMIN — TRAMADOL HYDROCHLORIDE 50 MG: 50 TABLET, FILM COATED ORAL at 12:20

## 2021-01-01 RX ADMIN — SODIUM CHLORIDE, POTASSIUM CHLORIDE, SODIUM LACTATE AND CALCIUM CHLORIDE: 600; 310; 30; 20 INJECTION, SOLUTION INTRAVENOUS at 08:13

## 2021-01-01 RX ADMIN — FINASTERIDE 5 MG: 5 TABLET, FILM COATED ORAL at 09:25

## 2021-01-01 RX ADMIN — TRAMADOL HYDROCHLORIDE 50 MG: 50 TABLET, FILM COATED ORAL at 12:59

## 2021-01-01 RX ADMIN — ACETAMINOPHEN 650 MG: 325 TABLET ORAL at 05:31

## 2021-01-01 RX ADMIN — Medication 1 AMPULE: at 21:22

## 2021-01-01 RX ADMIN — Medication 10 ML: at 05:58

## 2021-01-01 RX ADMIN — HYDROMORPHONE HYDROCHLORIDE 1 MG: 2 TABLET ORAL at 01:45

## 2021-01-01 RX ADMIN — FINASTERIDE 5 MG: 5 TABLET, FILM COATED ORAL at 09:15

## 2021-01-01 RX ADMIN — Medication 10 ML: at 13:43

## 2021-01-01 RX ADMIN — Medication 10 ML: at 21:25

## 2021-01-01 RX ADMIN — Medication 1 AMPULE: at 08:25

## 2021-01-01 RX ADMIN — ACETAMINOPHEN 650 MG: 325 TABLET ORAL at 13:40

## 2021-01-01 RX ADMIN — AMPICILLIN SODIUM 1 G: 1 INJECTION, POWDER, FOR SOLUTION INTRAMUSCULAR; INTRAVENOUS at 02:00

## 2021-01-01 RX ADMIN — DEXMEDETOMIDINE HYDROCHLORIDE 4 MCG: 100 INJECTION, SOLUTION, CONCENTRATE INTRAVENOUS at 07:31

## 2021-01-01 RX ADMIN — Medication 10 ML: at 21:08

## 2021-01-01 RX ADMIN — VANCOMYCIN HYDROCHLORIDE 500 MG: 500 INJECTION, POWDER, LYOPHILIZED, FOR SOLUTION INTRAVENOUS at 23:06

## 2021-01-01 RX ADMIN — AMPICILLIN SODIUM 2 G: 2 INJECTION, POWDER, FOR SOLUTION INTRAMUSCULAR; INTRAVENOUS at 13:42

## 2021-01-01 RX ADMIN — WATER 2 G: 1 INJECTION INTRAMUSCULAR; INTRAVENOUS; SUBCUTANEOUS at 07:36

## 2021-01-01 RX ADMIN — PROPOFOL 30 MG: 10 INJECTION, EMULSION INTRAVENOUS at 08:20

## 2021-01-01 RX ADMIN — AMPICILLIN SODIUM 2 G: 2 INJECTION, POWDER, FOR SOLUTION INTRAMUSCULAR; INTRAVENOUS at 03:22

## 2021-01-01 RX ADMIN — AMPICILLIN SODIUM 1 G: 1 INJECTION, POWDER, FOR SOLUTION INTRAMUSCULAR; INTRAVENOUS at 21:22

## 2021-01-01 RX ADMIN — MELATONIN 6 MG: at 23:32

## 2021-01-01 RX ADMIN — ACETAMINOPHEN 650 MG: 325 TABLET ORAL at 15:10

## 2021-01-01 RX ADMIN — SODIUM CHLORIDE 40 MG: 9 INJECTION INTRAMUSCULAR; INTRAVENOUS; SUBCUTANEOUS at 21:30

## 2021-01-01 RX ADMIN — NYSTATIN 500000 UNITS: 100000 SUSPENSION ORAL at 21:30

## 2021-01-01 RX ADMIN — SUCRALFATE 1 G: 1 TABLET ORAL at 09:13

## 2021-01-01 RX ADMIN — SODIUM CHLORIDE 65 ML/HR: 4.5 INJECTION, SOLUTION INTRAVENOUS at 13:27

## 2021-01-01 RX ADMIN — NYSTATIN 500000 UNITS: 100000 SUSPENSION ORAL at 17:07

## 2021-01-01 RX ADMIN — FENTANYL CITRATE 50 MCG: 50 INJECTION, SOLUTION INTRAMUSCULAR; INTRAVENOUS at 07:34

## 2021-01-01 RX ADMIN — SUCRALFATE 1 G: 1 TABLET ORAL at 21:30

## 2021-01-01 RX ADMIN — AMPICILLIN SODIUM 2 G: 2 INJECTION, POWDER, FOR SOLUTION INTRAMUSCULAR; INTRAVENOUS at 20:36

## 2021-01-01 RX ADMIN — Medication 3 AMPULE: at 06:51

## 2021-01-01 RX ADMIN — SODIUM CHLORIDE 40 MG: 9 INJECTION INTRAMUSCULAR; INTRAVENOUS; SUBCUTANEOUS at 09:41

## 2021-01-01 RX ADMIN — ASPIRIN 81 MG: 81 TABLET, COATED ORAL at 08:25

## 2021-01-01 RX ADMIN — ACETAMINOPHEN 650 MG: 325 TABLET ORAL at 05:42

## 2021-01-01 RX ADMIN — HYDRALAZINE HYDROCHLORIDE 10 MG: 20 INJECTION INTRAMUSCULAR; INTRAVENOUS at 04:09

## 2021-01-01 RX ADMIN — MELATONIN 6 MG: at 21:35

## 2021-01-01 RX ADMIN — Medication 10 ML: at 22:35

## 2021-01-01 RX ADMIN — NYSTATIN 500000 UNITS: 100000 SUSPENSION ORAL at 18:16

## 2021-01-01 RX ADMIN — MELATONIN 6 MG: at 21:01

## 2021-01-01 RX ADMIN — Medication 1 AMPULE: at 10:08

## 2021-01-01 RX ADMIN — ACETAMINOPHEN 650 MG: 325 TABLET ORAL at 06:00

## 2021-01-01 RX ADMIN — Medication 10 ML: at 23:05

## 2021-01-01 RX ADMIN — SODIUM CHLORIDE 40 MG: 9 INJECTION INTRAMUSCULAR; INTRAVENOUS; SUBCUTANEOUS at 21:07

## 2021-01-01 RX ADMIN — MELATONIN 6 MG: at 23:34

## 2021-01-01 RX ADMIN — Medication 10 ML: at 21:02

## 2021-01-01 RX ADMIN — ACETAMINOPHEN 650 MG: 325 TABLET ORAL at 17:33

## 2021-01-01 RX ADMIN — Medication 10 ML: at 05:32

## 2021-01-01 RX ADMIN — ACETAMINOPHEN 650 MG: 325 TABLET ORAL at 06:05

## 2021-01-01 RX ADMIN — ONDANSETRON HYDROCHLORIDE 4 MG: 2 INJECTION, SOLUTION INTRAMUSCULAR; INTRAVENOUS at 09:33

## 2021-01-01 RX ADMIN — POLYETHYLENE GLYCOL 3350 17 G: 17 POWDER, FOR SOLUTION ORAL at 17:08

## 2021-01-01 RX ADMIN — Medication: at 09:46

## 2021-01-01 RX ADMIN — FUROSEMIDE 40 MG: 10 INJECTION, SOLUTION INTRAMUSCULAR; INTRAVENOUS at 16:46

## 2021-01-01 RX ADMIN — ACETAMINOPHEN 650 MG: 325 TABLET ORAL at 18:48

## 2021-01-01 RX ADMIN — METOPROLOL TARTRATE 25 MG: 25 TABLET, FILM COATED ORAL at 10:08

## 2021-01-01 RX ADMIN — Medication 1 AMPULE: at 09:37

## 2021-01-01 RX ADMIN — FENTANYL CITRATE 25 MCG: 50 INJECTION, SOLUTION INTRAMUSCULAR; INTRAVENOUS at 08:24

## 2021-01-01 RX ADMIN — CEFEPIME HYDROCHLORIDE 2 G: 2 INJECTION, POWDER, FOR SOLUTION INTRAVENOUS at 12:25

## 2021-01-01 RX ADMIN — ONDANSETRON HYDROCHLORIDE 4 MG: 2 INJECTION, SOLUTION INTRAMUSCULAR; INTRAVENOUS at 07:50

## 2021-01-01 RX ADMIN — NYSTATIN 500000 UNITS: 100000 SUSPENSION ORAL at 08:29

## 2021-01-01 RX ADMIN — CEFEPIME HYDROCHLORIDE 2 G: 2 INJECTION, POWDER, FOR SOLUTION INTRAVENOUS at 11:39

## 2021-01-01 RX ADMIN — METOPROLOL TARTRATE 25 MG: 25 TABLET, FILM COATED ORAL at 12:18

## 2021-01-01 RX ADMIN — NYSTATIN 500000 UNITS: 100000 SUSPENSION ORAL at 12:00

## 2021-01-01 RX ADMIN — ASPIRIN 81 MG: 81 TABLET, COATED ORAL at 08:01

## 2021-01-01 RX ADMIN — VANCOMYCIN HYDROCHLORIDE 500 MG: 500 INJECTION, POWDER, LYOPHILIZED, FOR SOLUTION INTRAVENOUS at 11:45

## 2021-01-01 RX ADMIN — VANCOMYCIN HYDROCHLORIDE 500 MG: 500 INJECTION, POWDER, LYOPHILIZED, FOR SOLUTION INTRAVENOUS at 17:52

## 2021-01-01 RX ADMIN — PROPOFOL 100 MCG/KG/MIN: 10 INJECTION, EMULSION INTRAVENOUS at 07:34

## 2021-01-01 RX ADMIN — ACETAMINOPHEN 650 MG: 325 TABLET ORAL at 11:16

## 2021-01-01 RX ADMIN — Medication 10 ML: at 05:33

## 2021-01-01 RX ADMIN — HYDRALAZINE HYDROCHLORIDE 10 MG: 20 INJECTION INTRAMUSCULAR; INTRAVENOUS at 23:48

## 2021-01-01 RX ADMIN — Medication 10 ML: at 13:14

## 2021-01-01 RX ADMIN — NYSTATIN 500000 UNITS: 100000 SUSPENSION ORAL at 09:13

## 2021-01-01 RX ADMIN — SODIUM CHLORIDE 40 MG: 9 INJECTION INTRAMUSCULAR; INTRAVENOUS; SUBCUTANEOUS at 20:28

## 2021-01-01 RX ADMIN — ONDANSETRON 4 MG: 2 INJECTION INTRAMUSCULAR; INTRAVENOUS at 08:22

## 2021-01-01 RX ADMIN — ACETAMINOPHEN 650 MG: 325 TABLET ORAL at 07:00

## 2021-01-01 RX ADMIN — SODIUM CHLORIDE 40 MG: 9 INJECTION INTRAMUSCULAR; INTRAVENOUS; SUBCUTANEOUS at 21:16

## 2021-01-01 RX ADMIN — Medication: at 21:20

## 2021-01-01 RX ADMIN — Medication 1 AMPULE: at 23:25

## 2021-01-01 RX ADMIN — NYSTATIN 500000 UNITS: 100000 SUSPENSION ORAL at 21:15

## 2021-01-01 RX ADMIN — ACETAMINOPHEN 650 MG: 325 TABLET ORAL at 23:32

## 2021-01-01 RX ADMIN — ACETAMINOPHEN 650 MG: 325 TABLET ORAL at 12:23

## 2021-01-01 RX ADMIN — ACETAMINOPHEN 650 MG: 325 TABLET ORAL at 13:41

## 2021-01-01 RX ADMIN — NYSTATIN 500000 UNITS: 100000 SUSPENSION ORAL at 14:48

## 2021-01-01 RX ADMIN — Medication 1 CAPSULE: at 10:30

## 2021-01-01 RX ADMIN — Medication 10 ML: at 05:00

## 2021-01-01 RX ADMIN — Medication 1 CAPSULE: at 08:01

## 2021-01-01 RX ADMIN — Medication 10 ML: at 03:22

## 2021-01-01 RX ADMIN — ASPIRIN 81 MG: 81 TABLET, COATED ORAL at 11:29

## 2021-01-01 RX ADMIN — Medication 10 ML: at 23:36

## 2021-01-01 RX ADMIN — FINASTERIDE 5 MG: 5 TABLET, FILM COATED ORAL at 08:53

## 2021-01-01 RX ADMIN — HYDROMORPHONE HYDROCHLORIDE 1 MG: 2 TABLET ORAL at 21:22

## 2021-01-01 RX ADMIN — FENTANYL CITRATE 25 MCG: 50 INJECTION, SOLUTION INTRAMUSCULAR; INTRAVENOUS at 08:46

## 2021-01-01 RX ADMIN — INFLUENZA VIRUS VACCINE 0.5 ML: 15; 15; 15; 15 SUSPENSION INTRAMUSCULAR at 15:42

## 2021-01-01 RX ADMIN — METOPROLOL TARTRATE 25 MG: 25 TABLET, FILM COATED ORAL at 10:29

## 2021-01-01 RX ADMIN — Medication 1 AMPULE: at 21:02

## 2021-01-01 RX ADMIN — OXYCODONE 5 MG: 5 TABLET ORAL at 08:24

## 2021-01-01 RX ADMIN — ACETAMINOPHEN 650 MG: 325 TABLET ORAL at 13:31

## 2021-01-01 RX ADMIN — Medication 1 CAPSULE: at 09:25

## 2021-01-01 RX ADMIN — NYSTATIN 500000 UNITS: 100000 SUSPENSION ORAL at 18:02

## 2021-01-01 RX ADMIN — SUCRALFATE 1 G: 1 TABLET ORAL at 16:06

## 2021-01-01 RX ADMIN — ACETAMINOPHEN 650 MG: 325 TABLET ORAL at 23:17

## 2021-01-01 RX ADMIN — CEFEPIME HYDROCHLORIDE 2 G: 2 INJECTION, POWDER, FOR SOLUTION INTRAVENOUS at 14:15

## 2021-01-01 RX ADMIN — Medication 10 ML: at 23:28

## 2021-01-01 RX ADMIN — POLYETHYLENE GLYCOL 3350 17 G: 17 POWDER, FOR SOLUTION ORAL at 08:29

## 2021-01-01 RX ADMIN — Medication 10 ML: at 15:55

## 2021-01-01 RX ADMIN — CEFEPIME HYDROCHLORIDE 2 G: 2 INJECTION, POWDER, FOR SOLUTION INTRAVENOUS at 12:38

## 2021-01-01 RX ADMIN — ACETAMINOPHEN 650 MG: 325 TABLET ORAL at 05:32

## 2021-01-01 RX ADMIN — AMPICILLIN AND SULBACTAM 3 G: 2; 1 INJECTION, POWDER, FOR SOLUTION INTRAMUSCULAR; INTRAVENOUS at 12:11

## 2021-01-01 RX ADMIN — Medication 10 ML: at 08:00

## 2021-01-01 RX ADMIN — FENTANYL CITRATE 25 MCG: 50 INJECTION, SOLUTION INTRAMUSCULAR; INTRAVENOUS at 08:14

## 2021-01-01 RX ADMIN — Medication 1 CAPSULE: at 09:15

## 2021-01-01 RX ADMIN — SUCRALFATE 1 G: 1 TABLET ORAL at 11:09

## 2021-03-17 NOTE — PROGRESS NOTES
1. Have you been to the ER, urgent care clinic since your last visit? Hospitalized since your last visit?  3-8-2021 angiogram lower extremity. 2. Have you seen or consulted any other health care providers outside of the [address] [place] [address] since your last visit? Include any pap smears or colon screening. No 
 
Chief Complaint Patient presents with  Pacemaker Check Yearly appt. Denied cardiac symptoms.   
'

## 2021-05-11 NOTE — PERIOP NOTES
[facility]  Preoperative Instructions        Surgery Date 5/17/21          Time of Arrival 0545    1. On the day of your surgery, please report to the Surgical Services Registration Desk and sign in at your designated time. The Surgery Center is located to the right of the Emergency Room. 2. You must have someone with you to drive you home. You should not drive a car for 24 hours following surgery. Please make arrangements for a friend or family member to stay with you for the first 24 hours after your surgery. 3. Do not have anything to eat or drink (including water, gum, mints, coffee, juice) after midnight ? 5/16/21? Nicholes Coca ? This may not apply to medications prescribed by your physician. ?(Please note below the special instructions with medications to take the morning of your procedure.)    4. We recommend you do not drink any alcoholic beverages for 24 hours before and after your surgery. 5. Contact your surgeons office for instructions on the following medications: non-steroidal anti-inflammatory drugs (i.e. Advil, Aleve), vitamins, and supplements. (Some surgeons will want you to stop these medications prior to surgery and others may allow you to take them)  **If you are currently taking Plavix, Coumadin, Aspirin and/or other blood-thinning agents, contact your surgeon for instructions. ** Your surgeon will partner with the physician prescribing these medications to determine if it is safe to stop or if you need to continue taking. Please do not stop taking these medications without instructions from your surgeon    6. Wear comfortable clothes. Wear glasses instead of contacts. Do not bring any money or jewelry. Please bring picture ID, insurance card, and any prearranged co-payment or hospital payment. Do not wear make-up, particularly mascara the morning of your surgery. Do not wear nail polish, particularly if you are having foot /hand surgery.   Wear your hair loose or down, no ponytails, buns, beckie pins or clips. All body piercings must be removed. Please shower with antibacterial soap for three consecutive days before and on the morning of surgery, but do not apply any lotions, powders or deodorants after the shower on the day of surgery. Please use a fresh towels after each shower. Please sleep in clean clothes and change bed linens the night before surgery. Please do not shave for 48 hours prior to surgery. Shaving of the face is acceptable. 7. You should understand that if you do not follow these instructions your surgery may be cancelled. If your physical condition changes (I.e. fever, cold or flu) please contact your surgeon as soon as possible. 8. It is important that you be on time. If a situation occurs where you may be late, please call [phone] (OR Holding Area). 9. If you have any questions and or problems, please call [phone] (Pre-admission Testing). 10. Your surgery time may be subject to change. You will receive a phone call the evening prior if your time changes. 11.  If having outpatient surgery, you must have someone to drive you here, stay with you during the duration of your stay, and to drive you home at time of discharge. Special Instructions: continue Effient per MD  covid test 5/13 @[facility]    TAKE ALL MEDICATIONS DAY OF SURGERY EXCEPT:  Aspirin, probiotic, effient    I understand a pre-operative phone call will be made to verify my surgery time. In the event that I am not available, I give permission for a message to be left on my answering service and/or with another person?   Yes [phone]         ___________________      __________   _________    (Signature of Patient)             (Witness)                (Date and Time)

## 2021-05-15 NOTE — ANESTHESIA PREPROCEDURE EVALUATION
Relevant Problems   CARDIOVASCULAR   (+) CAD (coronary artery disease), native coronary artery   (+) SSS (sick sinus syndrome) (HCC)       Anesthetic History               Review of Systems / Medical History  Patient summary reviewed, nursing notes reviewed and pertinent labs reviewed    Pulmonary          Smoker      Comments: Smoker - 7.5 pack years   Neuro/Psych              Cardiovascular    Hypertension  Valvular problems/murmurs: mitral stenosis and mitral insufficiency      Dysrhythmias   Pacemaker, CAD, PAD and hyperlipidemia  Pertinent negatives: Angina:     Exercise tolerance: <4 METS  Comments: S/P AVR    Sick Sinus Syndrome    ECG (3/17/21):  V paced    TTE (2/5/19): · Left ventricular low normal systolic function. Estimated left ventricular ejection fraction is 51 - 55%. Visually measured ejection fraction. Left ventricular mild concentric hypertrophy. Abnormal left ventricular septal motion. ·Mitral valve thickening. Mitral annular calcification. Mild mitral valve stenosis. Trace mitral valve regurgitation. ·Aortic valve is prosthetic. No stenosis and no regurgitation. ·Pacing wire present  ·Left atrial cavity size is mildly dilated.    GI/Hepatic/Renal       Hepatitis: type A    Liver disease    Comments: H/O Hepatitis A (1980s) Endo/Other             Other Findings   Comments: Left foot wound    Raynaud's disease         Physical Exam    Airway  Mallampati: II  TM Distance: 4 - 6 cm  Neck ROM: normal range of motion   Mouth opening: Normal     Cardiovascular  Regular rate and rhythm,  S1 and S2 normal,  no murmur, click, rub, or gallop             Dental    Dentition: Full upper dentures and Full lower dentures     Pulmonary  Breath sounds clear to auscultation               Abdominal  GI exam deferred       Other Findings            Anesthetic Plan    ASA: 3  Anesthesia type: MAC and total IV anesthesia          Induction: Intravenous  Anesthetic plan and risks discussed with: Patient

## 2021-05-17 NOTE — ANESTHESIA POSTPROCEDURE EVALUATION
Procedure(s): LEFT FOOT DEBRIDEMENT (Urgent). MAC, total IV anesthesia Anesthesia Post Evaluation Patient location during evaluation: PACU Note status: Adequate. Level of consciousness: responsive to verbal stimuli and sleepy but conscious Pain management: satisfactory to patient Airway patency: patent Anesthetic complications: no 
Cardiovascular status: acceptable Respiratory status: acceptable Hydration status: acceptable Comments: +Post-Anesthesia Evaluation and Assessment Patient: Manpreet Mehta MRN: [id number]  SSN: [id number] YOB: 1933  Age: 80 y.o. Sex: male Cardiovascular Function/Vital Signs BP (!) 149/76   Pulse 66   Temp 36.4 °C (97.5 °F)   Resp 17   Ht 5' 10\" (1.778 m)   Wt 64.4 kg (141 lb 15.6 oz)   SpO2 96%   BMI 20.37 kg/m² Patient is status post Procedure(s): LEFT FOOT DEBRIDEMENT (Urgent). Nausea/Vomiting: Controlled. Postoperative hydration reviewed and adequate. Pain: 
Pain Scale 1: Numeric (0 - 10) (05/17/21 0930) Pain Intensity 1: 0 (05/17/21 0930) Managed. Neurological Status:  
Neuro (WDL): Exceptions to [facility] (05/17/21 2978) At baseline. Mental Status and Level of Consciousness: Arousable. Pulmonary Status:  
O2 Device: None (Room air) (05/17/21 9443) Adequate oxygenation and airway patent. Complications related to anesthesia: None Post-anesthesia assessment completed. No concerns. Signed By: Sara Maldonado MD  
 5/17/2021 Post anesthesia nausea and vomiting:  controlled INITIAL Post-op Vital signs:  
Vitals Value Taken Time /76 05/17/21 0930 Temp 36.4 °C (97.5 °F) 05/17/21 8024 Pulse 64 05/17/21 0938 Resp 22 05/17/21 0706 SpO2 96 % 05/17/21 0938 Vitals shown include unvalidated device data.

## 2021-05-17 NOTE — PERIOP NOTES
Handoff Report from Operating Room to PACU    Report received from [id number] [address] Alejandro [place], RN and Mara Warner CRNA regarding Alison Philip. Surgeon(s):  Belen Dsouza MD  And Procedure(s) (LRB):  LEFT FOOT DEBRIDEMENT (Urgent) (Left)  confirmed   with dressings discussed. Anesthesia type, drugs, patient history, complications, estimated blood loss, vital signs, intake and output, and last pain medication, lines and temperature were reviewed.

## 2021-05-17 NOTE — H&P
History and Physical    Subjective:     Maryse Suárez is a 80 y.o. male who has nonhealing Lt foot wounds. Past Medical History:   Diagnosis Date    CAD (coronary artery disease), native coronary artery      RCA    Creatinine elevation 2020    H/O aortic valve stenosis     post valve replacement    Hypertension     Liver disease     Hepatits A in 1980s    Pacemaker     Raynaud's disease     SSS (sick sinus syndrome) ([address]     pacemaker    Valvular heart disease       Past Surgical History:   Procedure Laterality Date    HX AORTIC VALVE REPLACEMENT      HX PACEMAKER      VASCULAR SURGERY PROCEDURE UNLIST  03/2021    to legs     Family History   Problem Relation Age of Onset    Stroke Mother     Stroke Father     Cancer Sister     Heart Disease Brother         MI      Social History     Tobacco Use    Smoking status: Current Every Day Smoker     Packs/day: 0.25     Years: 30.00     Pack years: 7.50     Types: Cigarettes    Smokeless tobacco: Never Used   Substance Use Topics    Alcohol use: Yes     Alcohol/week: 1.0 standard drinks     Types: 1 Glasses of wine per week     Comment: weekly       Prior to Admission medications    Medication Sig Start Date End Date Taking? Authorizing Provider   ondansetron hcl (Zofran) 4 mg tablet Take 4 mg by mouth every eight (8) hours as needed for Nausea or Vomiting. Yes Provider, Historical   B.infantis-B.ani-B.long-B.bifi (Probiotic 4X) 10-15 mg TbEC Take  by mouth as needed. Yes Provider, Historical   finasteride (PROSCAR) 5 mg tablet Take 5 mg by mouth. 11/14/19  Yes Provider, Historical   metoprolol tartrate (LOPRESSOR) 25 mg tablet Take 25 mg by mouth daily. Yes Provider, Historical   aspirin 81 mg chewable tablet Take 81 mg by mouth daily. Yes Provider, Historical   OTHER    Yes Provider, Historical   prasugreL (EFFIENT) 10 mg tablet Take 10 mg by mouth daily. 3/9/21 6/7/21  Provider, Historical   tamsulosin (FLOMAX) 0.4 mg capsule as needed. 1/3/20   Provider, Historical     Allergies   Allergen Reactions    Codeine Other (comments)      pt states that he became high with morphine--yrs ago   pt states that he became high with morphine--yrs ago      Simvastatin Myalgia and Other (comments)     And weakness  And weakness          Review of Systems:  Denies CP/SOB    Objective:     Physical Exam:   Visit Vitals  BP (!) 175/91 (BP 1 Location: Left upper arm, BP Patient Position: At rest)   Pulse 70   Resp 26   Ht 5' 10\" (1.778 m)   Wt 64.4 kg (141 lb 15.6 oz)   BMI 20.37 kg/m²     General:  Alert, cooperative, no distress, appears stated age. Head:  Normocephalic, without obvious abnormality, atraumatic. Neck: Supple, symmetrical, trachea midline, no adenopathy, thyroid: no enlargement/tenderness/nodules, no carotid bruit and no JVD. Lungs:   Clear to auscultation bilaterally. Heart:  Regular rate and rhythm, S1, S2 normal, no murmur, click, rub or gallop. Abdomen:   Soft, non-tender. Bowel sounds normal. No masses,  No organomegaly. Extremities: Wound on medial 1st MTPJ, atraumatic, no cyanosis or edema. Neurologic: Normal strength, sensation throughout.        Assessment:     Left foot wounds    Plan:     Debride Left foot wounds    Signed By: Hector Stevenson MD     May 17, 2021

## 2021-05-17 NOTE — BRIEF OP NOTE
Brief Postoperative Note    Patient: Ameya Petersen  YOB: 1933  MRN: [id number]    Date of Procedure: 5/17/2021     Pre-Op Diagnosis: FOOT WOUND    Post-Op Diagnosis: Same as preoperative diagnosis. Procedure(s):  LEFT FOOT DEBRIDEMENT (Urgent)    Surgeon(s):  Jessica Vázquez MD    Surgical Assistant: None    Anesthesia: MAC     Estimated Blood Loss (mL): Minimal    Complications: None    Specimens: * No specimens in log *     Implants: * No implants in log *    Drains: * No LDAs found *    Findings: Excisional debridement of wound on medial 1st MTPJ and distal 5th toe and distal lower leg.     Electronically Signed by Aaron Santacruz MD on 5/17/2021 at 9:15 AM

## 2021-05-17 NOTE — PERIOP NOTES
Irrisept Wound Debridement and Cleansing System  Ref: [id number]; LOT: [id number] Expiration Date: 01/31/2023

## 2021-05-17 NOTE — PROGRESS NOTES
Patient discharged to home iv removed. Discharge instructions, scripts, and medication education done with patient and spouse.

## 2021-05-17 NOTE — DISCHARGE INSTRUCTIONS
Patient Discharge Instructions    Tony Jean / [id number] : 1933    Admitted 2021 Discharged: 2021     Take Home Medications     · It is important that you take the medication exactly as they are prescribed. · Keep your medication in the bottles provided by the pharmacist and keep a list of the medication names, dosages, and times to be taken in your wallet. · Do not take other medications without consulting your doctor. What to do at [address] [place] [address] [place]: Continue wound care to all three wounds with Santyl ointment and dry dressings daily or every other day. Recommended diet: AHA    Recommended activity: As Tolerated. No Strenuous activity or heavy lifting. Elevate left foot when possible. If you experience any of the following symptoms worsening drainage, odor, or redness at wound sites or fever over 101.4, please follow up with Dr Migel Flores ASAP. Follow-up with Dr Migel Flores in 1-2 weeks [phone]        Information obtained by :  I understand that if any problems occur once I am at home I am to contact my physician. I understand and acknowledge receipt of the instructions indicated above. Physician's or R.N.'s Signature                                                                  Date/Time                                                                                                                                              Patient or Representative Signature                                                          Date/Time  TO PREVENT AN INFECTION      1. [address] YOUR HANDS     To prevent infection, good handwashing is the most important thing you or your caregiver can do.  Wash your hands with soap and water or use the hand  we gave you before you touch any wounds.     2. SHOWER     Use the antibacterial soap we gave you when you take a shower.  Shower with this soap until your wounds are healed.  To reach all areas of your body, you may need someone to help you.  Dont forget to clean your belly button with every shower. 3.  USE CLEAN SHEETS     Use freshly cleaned sheets on your bed after surgery.  To keep the surgery site clean, do not allow pets to sleep with you while your wound is still healing. 4. STOP SMOKING     Stop smoking, or at least cut back on smoking     Smoking slows your healing. 5.  CONTROL YOUR BLOOD SUGAR     High blood sugars slow wound healing. If you are diabetic, control your blood sugar levels before and after your surgery. DISCHARGE SUMMARY from Nurse    The following personal items are in your possession at time of discharge:    Dental Appliances: None  Visual Aid: None  Home Medications: None  Jewelry: None  Clothing: None (left in in pt. room)  Other Valuables: None             PATIENT INSTRUCTIONS:    After general anesthesia or intravenous sedation, for 24 hours or while taking prescription Narcotics:  Limit your activities  Do not drive and operate hazardous machinery  Do not make important personal or business decisions  Do  not drink alcoholic beverages  If you have not urinated within 8 hours after discharge, please contact your surgeon on call. Report the following to your surgeon:  Excessive pain, swelling, redness or odor of or around the surgical area  Temperature over 100.5  Nausea and vomiting lasting longer than 4 hours or if unable to take medications  Any signs of decreased circulation or nerve impairment to extremity: change in color, persistent  numbness, tingling, coldness or increase pain  Any questions    To prevent infection remember to refer to your handout on handwashing given to you by your nurse. *  Please give a list of your current medications to your Primary Care Provider.     *  Please update this list whenever your medications are discontinued, doses are      changed, or new medications (including over-the-counter products) are added. *  Please carry medication information at all times in case of emergency situations. These are general instructions for a healthy lifestyle:    No smoking/ No tobacco products/ Avoid exposure to second hand smoke    Surgeon General's Warning:  Quitting smoking now greatly reduces serious risk to your health. Obesity, smoking, and sedentary lifestyle greatly increases your risk for illness    A healthy diet, regular physical exercise & weight monitoring are important for maintaining a healthy lifestyle    You may be retaining fluid if you have a history of heart failure or if you experience any of the following symptoms:  Weight gain of 3 pounds or more overnight or 5 pounds in a week, increased swelling in our hands or feet or shortness of breath while lying flat in bed. Please call your doctor as soon as you notice any of these symptoms; do not wait until your next office visit. Recognize signs and symptoms of STROKE:    F-face looks uneven    A-arms unable to move or move unevenly    S-speech slurred or non-existent    T-time-call 911 as soon as signs and symptoms begin-DO NOT go       Back to bed or wait to see if you get better-TIME IS BRAIN.  The discharge information has been reviewed with the patient. The patient verbalized understanding.

## 2021-05-27 NOTE — OP NOTES
[address] 
OPERATIVE REPORT Name:  Carmelita Gallardo 
MR#:  [id number] :  1933 ACCOUNT #:  [de-identified] DATE OF SERVICE:  2021 PREOPERATIVE DIAGNOSIS:  Left foot wound. POSTOPERATIVE DIAGNOSIS:  Left foot wound. PROCEDURE PERFORMED:  Excisional debridement of left foot wound. SURGEON:  Lilly Diaz MD 
 
ASSISTANT:  None. ANESTHESIA:  MAC. COMPLICATIONS:  None. SPECIMENS REMOVED:  None. IMPLANTS:  None. ESTIMATED BLOOD LOSS:  Minimal. 
 
DRAINS:  None. INDICATIONS:  The patient is an 80-year-old male with peripheral arterial disease who has a wound on the medial aspect of the left first metatarsal head and on the distal aspect of the left fifth toe. He also has a wound on the distal lower leg. He presents for debridement. PROCEDURE:  After informed consent was obtained, the patient was given preoperative intravenous antibiotics within 1 hour of the start of the procedure. He was taken to the operating room and after establishing adequate sedation, the left distal lower leg and foot were prepped and draped as a sterile field. There was a wound on the medial aspect of the first metatarsophalangeal joint, which measured 10 mm in diameter and was a circular wound with necrotic eschar on the surface. Using a 15 blade scalpel, the necrotic tissue was sharply excised creating a new circular wound, which was 12 mm in diameter. The base was clean, pale tissue. The base of the wound was debrided with a [employer] ultrasonic debrider. The base was 100% clean, pale, healthy joint capsule tissue, but there was no communication with the joint space. The skin edges were viable skin. Next, there was necrotic black eschar on the tip of the fifth toe. The toenail easily avulsed and then a 15 blade scalpel was used to sharply excise the black tissue. This created a wound, which was 8 mm x 5 mm x 1 mm.   There was palpable bone in the base of the wound from the tuft of the distal fifth phalanx. This was debrided with a rongeur. Following debridement, base of the wound was 100% clean, healthy, subcutaneous fat skin and small spot of healthy cortical bone, which bled well. Following this, there was a linear wound on the anterior distal lower leg, which measured 3 x 0.5 x 0.1 cm. The base was pale, gray deep dermis. This was scraped with the blade of a 15 blade scalpel and then debrided with a [employer] ultrasonic debrider back to clean, pink healthy deep dermis and subcutaneous fat. The wound dimensions increased to 3.2 x 8 mm x 0.2 cm. All wounds were then covered with wet-to-dry dressings and the patient was transferred to the PACU in stable condition. MD LUPIS [email or web address] 
D:  05/27/2021 8:04 
T:  05/27/2021 8:47 JOB #:  [id number]

## 2021-07-20 NOTE — TELEPHONE ENCOUNTER
Spoke with Josette Yogesh Mirtha - informed him he still had battery on his PM at the last remote on 7/7/21, next scheduled send is 8/2/21. Informed him he can always send an extra transmission if he develops any new symptoms (AUGUSTIN/fatigue/lightheaded) but still has 3 full months of pacemaker working once we receive the RIMA \"trigger\". No further questions at this time.

## 2021-07-20 NOTE — TELEPHONE ENCOUNTER
Pts wife had an appt today and questioned her  appt to get a new battery for his device- please call wife at [phone].     Thank  Natty Jaramillo

## 2021-08-30 NOTE — TELEPHONE ENCOUNTER
Spoke with patient. Relayed test results as per Dr. Rodolfo Wolf note below. Advised that he will be hearing from our  soon to set up generator change appointment.

## 2021-08-30 NOTE — TELEPHONE ENCOUNTER
----- Message from Benedict Banerjee MD sent at 8/30/2021  2:57 PM EDT -----  Pls ler him know that his lvef is low normal  ----- Message -----  From: Sandford Homans, RN  Sent: 8/26/2021   9:40 AM EDT  To: Benedict Banerjee MD      ----- Message -----  From: Gaetano Smith MD  Sent: 8/25/2021   5:53 PM EDT  To: Finesse Mcguire, DAI

## 2021-10-06 PROBLEM — L03.116 CELLULITIS OF LEFT FOOT: Status: ACTIVE | Noted: 2021-01-01

## 2021-10-06 NOTE — ED NOTES
TRANSFER - OUT REPORT:    Verbal report given to LÓPEZ Perez (name) on Samia Celis  being transferred to [address] [place] [address] (unit) for routine progression of care       Report consisted of patients Situation, Background, Assessment and   Recommendations(SBAR). Information from the following report(s) SBAR was reviewed with the receiving nurse. Lines:   Peripheral IV 10/06/21 Left Forearm (Active)        Opportunity for questions and clarification was provided. Patient transported with transport technician prior to RN obtaining last vital signs. Skin warm and dry. Respirations even and unlabored. In no apparent distress at this time.

## 2021-10-06 NOTE — PROGRESS NOTES
Pharmacy Medication Reconciliation     The patient was interviewed regarding current PTA medication list, use and drug allergies;  partner present in room and obtained permission from patient to discuss drug regimen with visitor(s) present. The patient was questioned regarding use of any other inhalers, topical products, over the counter medications, herbal medications, vitamin products or ophthalmic/nasal/otic medication use. Allergy Update: Codeine and Simvastatin    Recommendations/Findings: The following amendments were made to the patient's active medication list on file at [address]:   1) Additions:   +levofloxacin  +tramadol  +zolpidem     2) Deletions:   -probiotic   -other (OTC allergy med prn)  -tamsulosin     3) Changes:      Pertinent Findings:     Clarified PTA med list with patient & partner. PTA medication list was corrected to the following:     Prior to Admission Medications   Prescriptions Last Dose Informant Taking?   aspirin 81 mg chewable tablet 10/5/2021 at Unknown time  Yes   Sig: Take 81 mg by mouth daily. finasteride (PROSCAR) 5 mg tablet 10/5/2021 at Unknown time  Yes   Sig: Take 5 mg by mouth.   levoFLOXacin (LEVAQUIN) 500 mg tablet 10/5/2021 at Unknown time  Yes   Sig: Take 500 mg by mouth daily. metoprolol tartrate (LOPRESSOR) 25 mg tablet 10/5/2021 at Unknown time  Yes   Sig: Take 25 mg by mouth daily. traMADoL (ULTRAM) 50 mg tablet 10/5/2021 at Unknown time  Yes   Sig: Take 50 mg by mouth Every 4-6 hours as needed for pain   zolpidem (AMBIEN) 5 mg tablet 10/5/2021 at Unknown time  Yes   Sig: Take 5 mg by mouth nightly. Facility-Administered Medications: None        Thank you,  Anika Cole PharmD.  Candidate 2022

## 2021-10-06 NOTE — PROGRESS NOTES
Pt expressed concerns about not being on cardiac monitoring since his pacemaker is due for a checkup with his cardiologist soon. Sandra Ramirez NP states that cardiac monitoring is not indicated at this time. Informed pt.

## 2021-10-06 NOTE — H&P
Vascular Surgery History & Physical    Subjective:     Tammie Tatum is a frail elderly 80 y.o.  male with a pmhx significant for ASHD, HTN, SSS s/p PPM placement, CKD, and Raynaud's disease. He continues to smoke. He has known PAD and is s/p right popliteal atherectomy/BAP/stenting and posterior tibial atherectomy/BAP on 9/16/2021 for gangrene of the right second toe. Since the procedure he underwent amputation of the toe. He returned to the clinic on 10/5/2021 with dehiscence of his surgical wound. He had purulent drainage of the wounds. He had a right posterior heel ulceration. He had developed open wounds of the left shin and presented in an unna boot which was removed. His right RAVINDRA had improved and is now 1.09; however, his TBI was 0.0. His left RAVINDRA prior to the unna boot placement was 0.85 and now is 0.56 with multiple ulcerations of the left shin w/ purulent drainage. He has failed outpatient treatment with Levaquin. He is being referred to the hospital for admission for IV antibxs and surgical debridement of the right foot. He likely will need an arteriogram of the left leg as well. Past medical history  Peripheral artery disease  -s/p right popliteal atherectomy/BAP/stenting and posterior tibial atherectomy/BAP (9/16/2021)  -s/p atherectomy and angioplasty of Lt SFA and popliteal w/ DCB and PTA of TP Trunk, Peroneal, and PT(5/2021).  Dr. Jose Antonio Kapoor  -s/p left peroneal and PT atherectomy and BAP  (6/22/2021) Dr. 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 He requires assistance with his ADLs. Home medication   Levaquin 500 mg every day  Tramadol 50 mg 4-6 hours PRN pain  Ambien 5 mg nightly  Proscar 5 mg daily  Metoprolol tartrate 25 mg daily  Aspirin 81 mg daily       Allergies  Codeine: Altered mental status  Simvastatin: Myalgias    Review of Systems   Constitutional: Positive for activity change. Negative for appetite change, chills, diaphoresis, fatigue and fever. HENT: Negative for congestion. Eyes: Negative for visual disturbance. Respiratory: Negative for cough and shortness of breath. Cardiovascular: Positive for leg swelling. Negative for chest pain and palpitations. Gastrointestinal: Negative for nausea and vomiting. Endocrine: Negative for polydipsia and polyuria. Genitourinary: Negative. Musculoskeletal: Positive for arthralgias and gait problem. Negative for myalgias. Skin: Positive for color change and wound (Right foot and left shin). Allergic/Immunologic: Negative for immunocompromised state. Neurological: Positive for weakness (Generalized). Hematological: Negative. Psychiatric/Behavioral: Negative. Objective:     Patient Vitals for the past 24 hrs:   BP Temp Pulse Resp SpO2 Height Weight   10/06/21 0943 (!) 141/55 97.5 °F (36.4 °C) 66 16 100 % 5' 11\" (1.803 m) 64.4 kg (141 lb 15.6 oz)     Physical Exam  Constitutional:       Comments: Pale, frail, elderly thin  male in no acute distress   HENT:      Head: Normocephalic. Nose: Nose normal.      Mouth/Throat:      Mouth: Mucous membranes are moist.   Cardiovascular:      Rate and Rhythm: Normal rate and regular rhythm. Pulses:           Femoral pulses are 2+ on the right side and 2+ on the left side.        Popliteal pulses are 1+ on the right side and 0 on the left side. Dorsalis pedis pulses are 0 on the right side and 0 on the left side. Posterior tibial pulses are 0 on the right side and 0 on the left side. Pulmonary:      Effort: Pulmonary effort is normal. No respiratory distress. Abdominal:      General: Abdomen is flat. There is no distension. Musculoskeletal:         General: Normal range of motion. Cervical back: Normal range of motion. Right lower leg: Edema present. Left lower leg: Edema present. Skin:     Coloration: Skin is pale. Comments: Right foot: Ulceration of the right anterior heel. Dehiscence of second digit toe amputation site with purulent drainage and surrounding necrosis. Left leg: Multiple ulcerations of the left shin with purulent drainage. Neurological:      Mental Status: Mental status is at baseline. Psychiatric:         Mood and Affect: Mood normal.         Behavior: Behavior normal.       Data Review:   Recent Results (from the past 24 hour(s))   CBC WITH AUTOMATED DIFF    Collection Time: 10/06/21 10:46 AM   Result Value Ref Range    WBC 6.5 4.1 - 11.1 K/uL    RBC 3.16 (L) 4.10 - 5.70 M/uL    HGB 9.3 (L) 12.1 - 17.0 g/dL    HCT 30.3 (L) 36.6 - 50.3 %    MCV 95.9 80.0 - 99.0 FL    MCH 29.4 26.0 - 34.0 PG    MCHC 30.7 30.0 - 36.5 g/dL    RDW 15.0 (H) 11.5 - 14.5 %    PLATELET 383 328 - 164 K/uL    MPV 9.8 8.9 - 12.9 FL    NRBC 0.0 0  WBC    ABSOLUTE NRBC 0.00 0.00 - 0.01 K/uL    NEUTROPHILS 75 32 - 75 %    LYMPHOCYTES 13 12 - 49 %    MONOCYTES 10 5 - 13 %    EOSINOPHILS 1 0 - 7 %    BASOPHILS 1 0 - 1 %    IMMATURE GRANULOCYTES 0 0.0 - 0.5 %    ABS. NEUTROPHILS 4.8 1.8 - 8.0 K/UL    ABS. LYMPHOCYTES 0.8 0.8 - 3.5 K/UL    ABS. MONOCYTES 0.7 0.0 - 1.0 K/UL    ABS. EOSINOPHILS 0.1 0.0 - 0.4 K/UL    ABS. BASOPHILS 0.0 0.0 - 0.1 K/UL    ABS. IMM.  GRANS. 0.0 0.00 - 0.04 K/UL    DF AUTOMATED     METABOLIC PANEL, COMPREHENSIVE    Collection Time: 10/06/21 10:46 AM   Result Value Ref Range Sodium 136 136 - 145 mmol/L    Potassium 4.3 3.5 - 5.1 mmol/L    Chloride 103 97 - 108 mmol/L    CO2 33 (H) 21 - 32 mmol/L    Anion gap 0 (L) 5 - 15 mmol/L    Glucose 100 65 - 100 mg/dL    BUN 37 (H) 6 - 20 MG/DL    Creatinine 1.97 (H) 0.70 - 1.30 MG/DL    BUN/Creatinine ratio 19 12 - 20      GFR est AA 39 (L) >60 ml/min/1.73m2    GFR est non-AA 32 (L) >60 ml/min/1.73m2    Calcium 8.0 (L) 8.5 - 10.1 MG/DL    Bilirubin, total 0.6 0.2 - 1.0 MG/DL    ALT (SGPT) 10 (L) 12 - 78 U/L    AST (SGOT) 13 (L) 15 - 37 U/L    Alk. phosphatase 68 45 - 117 U/L    Protein, total 6.4 6.4 - 8.2 g/dL    Albumin 2.5 (L) 3.5 - 5.0 g/dL    Globulin 3.9 2.0 - 4.0 g/dL    A-G Ratio 0.6 (L) 1.1 - 2.2     LACTIC ACID    Collection Time: 10/06/21 11:49 AM   Result Value Ref Range    Lactic acid 0.6 0.4 - 2.0 MMOL/L       Assessment/Plan:     Peripheral artery arterial disease with ulceration of the bilateral lower extremities  ABIs 10/5: Right 1.09 and left 0.56. TBI's flatline bilateral.  Following recent bilateral interventions. · We will not plan for arteriogram once infection has been controlled. Cellulitis of the bilateral lower extremities secondary to ulcerations. Patient did not meet SIRS criteria on arrival.  Failed outpatient treatment with oral antibiotics. · Initiated on broad-spectrum IV antibiotics after culture obtained from surgical wound dehiscence. · Consult wound care for management of left shin wounds. · Consult podiatry for management of the right foot wound. Coronary artery disease  Hypertension  -Stable. Resume home dose of beta-blocker.   Hyperlipidemia  -Statin intolerance  SSS  -S/p PPM placement  -Rate currently controlled  Aortic valve stenosis  -S/p aortic valve replacement     Chronic renal disease stage III-IV  Baseline creatinine 1.7-2.0  Near baseline  Anemia of chronic renal disease  Relatively stable    Tobacco use   -Smoking sensation EDU completed  -Nicotine patch ordered    General debility  · Float heels at all time  · Turn every 2 hours  · Air mattress  · Out of bed daily  · PT consulted    VTE prophylaxis:  SQ held for possible preprocedural intervention with podiatry  SCDs contraindicated in diffuse PAD    Disposition: To be determined.     Signed By: Art Jason, NIDIA     October 6, 2021

## 2021-10-06 NOTE — ED PROVIDER NOTES
EMERGENCY DEPARTMENT HISTORY AND PHYSICAL EXAM      Date: 10/6/2021  Patient Name: Parvez Sin    History of Presenting Illness     Chief Complaint   Patient presents with    Foot Pain     Pt referred by Dr Erin Hutton for admission for infected toe. Pt reports toe amputation 7 days ago and is having drainage. pt was evaluated yesterday by Dr Erin Hutton.  Post OP Complication       History Provided By: Patient    HPI: Parvez Sin, 80 y.o. male  presents to the ED with cc of infected right second toe. Patient had a amputation of the second toe on his right foot about 1 week ago by podiatry. Patient states he was not getting any wound care or dressing changes for about a week. He went to see his vascular surgeon and was referred to the emergency department today for admission. He has foul-smelling purulent drainage from the surgical site. He denies any fevers or chills. He has pain up to about midfoot. Pain worse with trying to ambulate. History of peripheral vascular disease. Past History     Past Medical History:  Past Medical History:   Diagnosis Date    CAD (coronary artery disease), native coronary artery      RCA    Creatinine elevation 2020    H/O aortic valve stenosis     post valve replacement    Hypertension     Liver disease     Hepatits A in 1980s    Pacemaker     Raynaud's disease     SSS (sick sinus syndrome) ([facility] [place] [address]     pacemaker    Valvular heart disease        Past Surgical History:  Past Surgical History:   Procedure Laterality Date    HX AORTIC VALVE REPLACEMENT      HX PACEMAKER      VASCULAR SURGERY PROCEDURE UNLIST  03/2021    to legs       Medications:  No current facility-administered medications on file prior to encounter. Current Outpatient Medications on File Prior to Encounter   Medication Sig Dispense Refill    levoFLOXacin (LEVAQUIN) 500 mg tablet Take 500 mg by mouth daily.  traMADoL (ULTRAM) 50 mg tablet Take 50 mg by mouth.  Every 4-6 hours as needed for pain      zolpidem (AMBIEN) 5 mg tablet Take 5 mg by mouth nightly.  finasteride (PROSCAR) 5 mg tablet Take 5 mg by mouth.  metoprolol tartrate (LOPRESSOR) 25 mg tablet Take 25 mg by mouth daily.  aspirin 81 mg chewable tablet Take 81 mg by mouth daily.  [DISCONTINUED] ondansetron hcl (Zofran) 4 mg tablet Take 4 mg by mouth every eight (8) hours as needed for Nausea or Vomiting.  [DISCONTINUED] B.infantis-B.ani-B.long-B.bifi (Probiotic 4X) 10-15 mg TbEC Take  by mouth as needed.  [DISCONTINUED] tamsulosin (FLOMAX) 0.4 mg capsule as needed.  [DISCONTINUED] OTHER          Family History:  Family History   Problem Relation Age of Onset    Stroke Mother     Stroke Father     Cancer Sister     Heart Disease Brother         MI       Social History:  Social History     Tobacco Use    Smoking status: Current Every Day Smoker     Packs/day: 0.25     Years: 30.00     Pack years: 7.50     Types: Cigarettes    Smokeless tobacco: Never Used   Substance Use Topics    Alcohol use: Yes     Alcohol/week: 1.0 standard drinks     Types: 1 Glasses of wine per week     Comment: weekly    Drug use: Never       Allergies: Allergies   Allergen Reactions    Codeine Other (comments)      pt states that he became high with morphine--yrs ago   pt states that he became high with morphine--yrs ago      Simvastatin Myalgia and Other (comments)     And weakness  And weakness         All the above components of the past  history are auto-populated from the electronic record. They have been reviewed and the patient has been interviewed for any pertinent past history that pertains to the patient's chief complaint and reason for visit. Not all pre-populated components may be accurate at the time this note was generated. Review of Systems   Review of Systems   Constitutional: Negative for chills and fever. HENT: Negative for congestion, ear pain, rhinorrhea, sore throat and trouble swallowing. Eyes: Negative for visual disturbance. Respiratory: Negative for cough, chest tightness and shortness of breath. Cardiovascular: Negative for chest pain and palpitations. Gastrointestinal: Negative for abdominal pain, blood in stool, constipation, diarrhea, nausea and vomiting. Genitourinary: Negative for decreased urine volume, difficulty urinating, dysuria and frequency. Musculoskeletal: Negative for back pain and neck pain. Right second toe purulent drainage and pain   Skin: Negative for color change and rash. Neurological: Negative for dizziness, weakness, light-headedness and headaches. Physical Exam   Physical Exam  Vitals and nursing note reviewed. Constitutional:       General: He is not in acute distress. Appearance: He is well-developed. He is not ill-appearing. HENT:      Head: Normocephalic. Eyes:      Conjunctiva/sclera: Conjunctivae normal.   Cardiovascular:      Rate and Rhythm: Normal rate and regular rhythm. Pulmonary:      Effort: Pulmonary effort is normal. No accessory muscle usage or respiratory distress. Abdominal:      General: There is no distension. Musculoskeletal:      Cervical back: Normal range of motion. Feet:    Skin:     General: Skin is warm and dry. Neurological:      Mental Status: He is alert and oriented to person, place, and time. Due to the COVID-19 pandemic, in order to reduce the spread and transmission of the virus, some basic elements of the physical exam have been deferred to reduce direct or close contact with the patient unless they are deemed to be absolutely necessary, regardless of whether the virus is highly suspected or not.       Diagnostic Study Results     Labs -     Recent Results (from the past 24 hour(s))   CBC WITH AUTOMATED DIFF    Collection Time: 10/06/21 10:46 AM   Result Value Ref Range    WBC 6.5 4.1 - 11.1 K/uL    RBC 3.16 (L) 4.10 - 5.70 M/uL    HGB 9.3 (L) 12.1 - 17.0 g/dL    HCT 30.3 (L) 36.6 - 50.3 %    MCV 95.9 80.0 - 99.0 FL    MCH 29.4 26.0 - 34.0 PG    MCHC 30.7 30.0 - 36.5 g/dL    RDW 15.0 (H) 11.5 - 14.5 %    PLATELET 419 368 - 058 K/uL    MPV 9.8 8.9 - 12.9 FL    NRBC 0.0 0  WBC    ABSOLUTE NRBC 0.00 0.00 - 0.01 K/uL    NEUTROPHILS 75 32 - 75 %    LYMPHOCYTES 13 12 - 49 %    MONOCYTES 10 5 - 13 %    EOSINOPHILS 1 0 - 7 %    BASOPHILS 1 0 - 1 %    IMMATURE GRANULOCYTES 0 0.0 - 0.5 %    ABS. NEUTROPHILS 4.8 1.8 - 8.0 K/UL    ABS. LYMPHOCYTES 0.8 0.8 - 3.5 K/UL    ABS. MONOCYTES 0.7 0.0 - 1.0 K/UL    ABS. EOSINOPHILS 0.1 0.0 - 0.4 K/UL    ABS. BASOPHILS 0.0 0.0 - 0.1 K/UL    ABS. IMM. GRANS. 0.0 0.00 - 0.04 K/UL    DF AUTOMATED     METABOLIC PANEL, COMPREHENSIVE    Collection Time: 10/06/21 10:46 AM   Result Value Ref Range    Sodium 136 136 - 145 mmol/L    Potassium 4.3 3.5 - 5.1 mmol/L    Chloride 103 97 - 108 mmol/L    CO2 33 (H) 21 - 32 mmol/L    Anion gap 0 (L) 5 - 15 mmol/L    Glucose 100 65 - 100 mg/dL    BUN 37 (H) 6 - 20 MG/DL    Creatinine 1.97 (H) 0.70 - 1.30 MG/DL    BUN/Creatinine ratio 19 12 - 20      GFR est AA 39 (L) >60 ml/min/1.73m2    GFR est non-AA 32 (L) >60 ml/min/1.73m2    Calcium 8.0 (L) 8.5 - 10.1 MG/DL    Bilirubin, total 0.6 0.2 - 1.0 MG/DL    ALT (SGPT) 10 (L) 12 - 78 U/L    AST (SGOT) 13 (L) 15 - 37 U/L    Alk. phosphatase 68 45 - 117 U/L    Protein, total 6.4 6.4 - 8.2 g/dL    Albumin 2.5 (L) 3.5 - 5.0 g/dL    Globulin 3.9 2.0 - 4.0 g/dL    A-G Ratio 0.6 (L) 1.1 - 2.2         Radiologic Studies -   XR FOOT RT MIN 3 V   Final Result   Surgical amputation of the second digit. No acute abnormality is   identified. CT Results  (Last 48 hours)    None        CXR Results  (Last 48 hours)    None            Medical Decision Making     I reviewed the vital signs, available nursing notes, past medical history, past surgical history, family history and social history.     Vital Signs-I have reviewed the vital signs that have been made available during the patient's emergency department visit. The vital signs auto-populated below are obtained mostly by electronic means through monitoring devices that have been downloaded into the patient's chart by the nursing staff. Some vital signs are not downloaded into the chart until after the patient has been discharged and this note has been completed, therefore some vital signs may not be available to the physician for review prior to patient's discharge or admission. The physician has reviewed the patient's triage vital signs, monitored the electronic monitoring devices remotely for any significant vital sign abnormalities, and have reviewed vital signs prior to discharge. Some vital signs reviewed at bedside or remotely utilizing electronic monitoring devices may be different than the vital signs downloaded into the electronic medical record. Some vital signs may be erroneous and inaccurate since they are obtained by electronic monitoring devices, and not all vital signs are verified for accuracy by nursing staff prior to downloading into the patient's chart. Patient Vitals for the past 24 hrs:   Temp Pulse Resp BP SpO2   10/06/21 0943 97.5 °F (36.4 °C) 66 16 (!) 141/55 100 %         Records Reviewed: Nursing notes for today's visit have been reviewed. I have also reviewed most recent medical records pertinent to today's complaints, if available in our medical record system. I have also reviewed all labs and imaging results from previous results in comparison to results obtained today. If an EKG was obtained today, it has been compared to previous EKGs, if available. If arriving via EMS, the EMS report has been reviewed if made available to us within the patient's time in the emergency department. Provider Notes (Medical Decision Making):   Patient presents with infection of the right second toe status post amputation. He has tenderness to about midfoot. I have concerned that there may be some osteomyelitis. He is not septic. Normal white blood cell count. Afebrile here in the emergency department. Patient was referred by his vascular surgeon who is admitting him here today. Obtaining imaging of the foot here in the ER and starting antibiotic therapy IV. ED Course:   Initial assessment performed. The patients presenting problems have been discussed, and they are in agreement with the care plan formulated and outlined with them. I have encouraged them to ask questions as they arise throughout their visit. Orders Placed This Encounter    CULTURE, BLOOD, PAIRED    CULTURE, WOUND W GRAM STAIN    XR FOOT RT MIN 3 V    CBC WITH AUTOMATED DIFF    METABOLIC PANEL, COMPREHENSIVE    LACTIC ACID, PLASMA    LACTIC ACID, PLASMA    METABOLIC PANEL, BASIC    CBC W/O DIFF    IP CONSULT TO PODIATRY    EKG, 12 LEAD, INITIAL    INSERT PERIPHERAL IV ONE TIME STAT    DISCONTD: ampicillin-sulbactam (UNASYN) 3 g in 0.9% sodium chloride (MBP/ADV) 100 mL MBP    vancomycin (VANCOCIN) 1500 mg in  ml infusion    morphine injection 2 mg    levoFLOXacin (LEVAQUIN) 500 mg tablet    traMADoL (ULTRAM) 50 mg tablet    zolpidem (AMBIEN) 5 mg tablet    cefepime (MAXIPIME) 1 g in 0.9% sodium chloride (MBP/ADV) 50 mL MBP    L.acidophilus-paracasei-S.thermophil-bifidobacter (RISAQUAD) 8 billion cell capsule    HYDROmorphone (DILAUDID) tablet 1 mg    acetaminophen (TYLENOL) tablet 650 mg    INITIAL PHYSICIAN ORDER: INPATIENT Surgical; No; 2. Patient admitted for Inpatient Only Procedure (Surgical)    IP CONSULT TO PHARMACY - VANCOMYCIN DOSING       Procedures      Critical Care Time:   0    Disposition:  Admit      Diagnosis     Clinical Impression:   1. Surgical wound infection    2.  Status post amputation of toe of right foot ([facility] [address]

## 2021-10-06 NOTE — PROGRESS NOTES
Transition of Care Plan:    RUR: 12% - low risk  Disposition: Anticipating home with [facility] [place]  Follow up appointments: PCP, Specialist  DME needed: No needs identified. Pt has RW, cane at home. Transportation at [facility] or means to access home:   yes     IM Medicare Letter: 2nd IM Medicare letter to be given prior to discharge. Is patient a BCPI-A Bundle: No         If yes, was Bundle Letter given?:  N/A   Caregiver Contact: Tung Quinn, spouse, H) [phone], C) [phone]  Discharge Caregiver contacted prior to discharge? CM to contact prior to discharge, if pt request.    Reason for Admission:  PAD with ulceration of bilateral lower extremities, cellulitis of lower extremities. RUR Score:   12% - low risk                  Plan for utilizing home health: Anticipating [facility] [place] services         PCP: First and Last name:  RYLIE Begum   Name of Practice: [address] [place] [address]   Are you a current patient: Yes/No: yes   Approximate date of last visit: 3/2021   Can you participate in a virtual visit with your PCP: yes                    Current Advanced Directive/Advance Care Plan: No Order   Advance Care Planning     General Advance Care Planning (ACP) Conversation      Date of Conversation: 10/6/2021  Conducted with: Patient with [address]:   Tung Simpsons, spouse, H) [phone], C) [phone]    Today we documented Decision Maker(s) consistent with Legal Next of Kin hierarchy. Pt states he has an ACP. His wife is his primary decision maker. Content/Action Overview:    Has ACP document(s) NOT on file - requested patient to provide  Reviewed DNR/DNI and patient elects Full Code (Attempt Resuscitation)    Length of Voluntary ACP Conversation in minutes:  <16 minutes (Non-Billable)    Healthcare Decision Maker:   Tung Quinn, spouse, H) [phone], C) [phone]                  Transition of Care Plan:   Anticipating home with [facility]    CM met with patient at the bedside to complete initial assessment. CM introduced role, verified demographics, and discussed discharge planning. Mr. Jone Burkett is a 80year old male admitted with PAD with ulceration of bilateral lower extremities, cellulitis of lower extremities. He is insured with [employer] Medicare A/B and a secondary (generic commercial). Pt uses the [employer] in [address] [facility]. Pt lives with his wife in a 2-story home that has 4-5 steps to enter. His bedroom is on the 1st floor. Pt states he is independent with ADLs and IADLs. Pt drives. States he has a RW and cane at home. Pt denies hx of HH, SNF, or Inpt Rehab. At time of discharge, pt's wife will transport him home. Unit CM will continue to follow. Care Management Interventions  PCP Verified by CM: Yes Juan Martell [facility]. last appt 3/2021.)  Mode of Transport at Discharge:  Other (see comment) (spouse)  Transition of Care Consult (CM Consult): Discharge Planning  Discharge Durable Medical Equipment: No  Physical Therapy Consult: Yes  Occupational Therapy Consult: No  Speech Therapy Consult: No  Support Systems: Spouse/Significant Other, Child(prasanna)  Confirm Follow Up Transport: Family  Discharge Location  Discharge Placement: Home    Gala Sun RN, BSN, [address] [facility] Care Manager  [phone]

## 2021-10-06 NOTE — PROGRESS NOTES
Physical Therapy    PT order noted; start time is 10/7 at 0400. Will follow tomorrow for eval as appropriate.     Alon Espinal, PT

## 2021-10-07 NOTE — PROGRESS NOTES
Pharmacy Antimicrobial Kinetic Dosing    Indication for Antimicrobials: SSTI     Current Regimen of Each Antimicrobial:  Vancomycin, pharmacy to dose (Start Date 10/6; Day # 2)  Cefepime 2g IV q24h (start 10/6; day 2)    Previous Antimicrobial Therapy:  Unasyn x 1 10/6    Goal Level: AUC: 400-600 mg/hr/Liter/day    Date Dose & Interval Measured (mcg/mL) Predicted AUC/BOLA                       Date & time of next level:     Dosing calculator used: [id number] calculator    Significant Positive Cultures:   10/6 pbcx - ngsf (pending)  10/6 foot wound - gnr & gpc in pairs (pending)    Conditions for Dosing Consideration: None - borderline malnourished    Labs:  Recent Labs     10/07/21  0309 10/06/21  1046   CREA 1.79* 1.97*   BUN 33* 37*     Recent Labs     10/07/21  0309 10/06/21  1046   WBC 7.4 6.5     Temp (24hrs), Av.5 °F (36.4 °C), Min:97.3 °F (36.3 °C), Max:97.6 °F (36.4 °C)        Creatinine Clearance (mL/min):   CrCl (Ideal Body Weight): 30.4   If actual weight < IBW: CrCl (Actual Body Weight) 26.0    Impression/Plan:   Continue cefepime as ordered  Patient has CKD -- at baseline  Patient received vancomycin 1500mg IV x1 and will follow with vancomycin 500mg IV q18h to give an estimated   BMP daily  Antimicrobial stop date TBD     Pharmacy will follow daily and adjust medications as appropriate for renal function and/or serum levels.     Thank you,  Carla Campbell, PHARMD    Vancomycin Dosing Document    Documents located on pharmacy Teams site: Clinical Practice -> Antimicrobial Stewardship -> Antibiotics_Vancomycin     Aminoglycoside Dosing Document    Documents located on pharmacy Teams site: Clinical Practice -> Antimicrobial Stewardship -> Antibiotics_Aminoglycosides

## 2021-10-07 NOTE — PROGRESS NOTES
End of Shift Note    Bedside shift change report given to [place], RN (oncoming nurse) by Deepak Tomas RN (offgoing nurse). Report included the following information SBAR, Kardex, Intake/Output, MAR and Recent Results    Shift worked:  7p-7a     Shift summary and any significant changes:     Pt ambulated to bathroom multiple times with walker during the night. Pt BP was 193/101. Dr. Rg Ocasio notified, ordered hydralazine. BP now stable. Right foot dressing CDI. Concerns for physician to address:  none     Zone phone for oncoming shift:   [phone]       Activity:  Activity Level: Up with Assistance  Number times ambulated in hallways past shift: 0  Number of times OOB to chair past shift: 0    Cardiac:   Cardiac Monitoring: No           Access:   Current line(s): PIV     Genitourinary:   Urinary status: voiding    Respiratory:   O2 Device: None (Room air)  Chronic home O2 use?: NO  Incentive spirometer at bedside: NO     GI:     Current diet:  ADULT DIET Regular; Low Fat/Low Chol/High Fiber/ANGELICA  Passing flatus: YES  Tolerating current diet: YES       Pain Management:   Patient states pain is manageable on current regimen: YES    Skin:  Flaco Score: 16  Interventions: float heels and increase time out of bed    Patient Safety:  Fall Score:  Total Score: 2  Interventions: assistive device (walker, cane, etc), gripper socks and pt to call before getting OOB       Length of Stay:  Expected LOS: 3d 9h  Actual LOS: [address] [place] Raffy, RN

## 2021-10-07 NOTE — WOUND CARE
Wound care consult for the left lower leg wound. Patient was admitted yesterday afternoon with wounds that were present on admission. Patient's right leg and foot are being cared for by Podiatry. We are consulted to see the left lower leg wounds that are chronic. Patient has wound care from Home care at home. Assessment: Generally pink and clean. Very little drainage noted but the wound is painful. Wound Pretibial Left Chronic Left lower leg wound (Active)   Wound Image   10/07/21 1228   Wound Etiology Venous 10/07/21 1228   Dressing Status New dressing applied 10/07/21 1228   Cleansed Wound cleanser 10/07/21 1228   Dressing/Treatment Alginate with Ag;Silver dressing; Foam 10/07/21 1228   Dressing Change Due 10/09/21 10/07/21 1228   Wound Length (cm) 9 cm 10/07/21 1228   Wound Width (cm) 6.2 cm 10/07/21 1228   Wound Depth (cm) 0.4 cm 10/07/21 1228   Wound Surface Area (cm^2) 55.8 cm^2 10/07/21 1228   Wound Volume (cm^3) 22.32 cm^3 10/07/21 1228   Wound Assessment Pink/red;Pale granulation tissue;Slough 10/07/21 1228   Drainage Amount Scant 10/07/21 1228   Drainage Description Serosanguinous; Yellow 10/07/21 1228   Wound Odor None 10/07/21 1228   Edges Defined edges 10/07/21 1228   Wound Thickness Description Full thickness 10/07/21 1228     Treatment: Cleansed the wound and dressed with silver dressing and large foam.   Plan: Wound care to be done by nursing every other day on odd days. Discussed with nursing today.    Geovani Birch, RN, BSN, [facility]

## 2021-10-07 NOTE — PROGRESS NOTES
Transition of Care Plan:     RUR: 11% - low risk  Disposition: Anticipating home with [facility] vs SNF  Will need NERISSA orders for [facility]. Follow up appointments: PCP, Specialist  DME needed: No needs identified. Pt has RW, cane at home. Transportation at [facility] or means to access home:   yes     IM Medicare Letter: 2nd IM Medicare letter to be given prior to discharge. Is patient a BCPI-A Bundle: No                    If yes, was Bundle Letter given?:  N/A   Caregiver Contact: Analilia Parada, spouse, H) [phone], C) [phone]  Discharge Caregiver contacted prior to discharge? yes    Initial Note 4:11 pm: In review of chart, pt is not medically stable for discharge. Unit CM will continue to follow.      Amrit Roca RN, BSN, [address] [place] [address] Care Manager  [phone]

## 2021-10-07 NOTE — PROGRESS NOTES
Problem: Mobility Impaired (Adult and Pediatric)  Goal: *Acute Goals and Plan of Care (Insert Text)  Description: FUNCTIONAL STATUS PRIOR TO ADMISSION: Patient was modified independent using a single point cane for functional mobility. Wife reports that he was very unsteady but refused to use the RW at home. HOME SUPPORT PRIOR TO ADMISSION: The patient lived with wife but did not require assist.    Physical Therapy Goals  Initiated 10/7/2021  1. Patient will move from supine to sit and sit to supine  in bed with modified independence within 7 day(s). 2.  Patient will transfer from bed to chair and chair to bed with contact guard assist using the least restrictive device within 7 day(s). 3.  Patient will perform sit to stand with contact guard assist within 7 day(s). 4.  Patient will ambulate with minimal assistance for 20 feet with the least restrictive device within 7 day(s). Outcome: Progressing Towards Goal   PHYSICAL THERAPY EVALUATION  Patient: Clement Wilcox (49 y.o. male)  Date: 10/7/2021  Primary Diagnosis: Cellulitis of left foot [L03.116]        Precautions:   Fall (NWB R LE, WBAT L LE)    ASSESSMENT  Based on the objective data described below, the patient presents with decreased functional mobility from baseline level of function. Patient currently limited by pain, gait instability, impaired balance and decreased activity tolerance. Patient currently needing Tigre for transfers has fair standing balance. Amb approx 4 feet with RW and Tigre-CGA. Has difficulty maintaining NWB status and overall balance is fair. Anticipate patient would have a hard time to managing mobility at home. Anticipate he would benefit from SNF rehab to progress to more independent level of function. Do not anticipate wife will be able to physically assist him at home.       Other factors to consider for discharge: at risk for falls, below functional baseline     Patient will benefit from skilled therapy intervention to address the above noted impairments. PLAN :  Recommendations and Planned Interventions: bed mobility training, transfer training, gait training, therapeutic exercises, neuromuscular re-education, patient and family training/education, and therapeutic activities      Frequency/Duration: Patient will be followed by physical therapy:  5 times a week to address goals. Recommendation for discharge: (in order for the patient to meet his/her long term goals)  Therapy up to 5 days/week in SNF setting      IF patient discharges home will need the following DME: patient owns DME required for discharge         SUBJECTIVE:   Patient stated I'm not too good at this.     OBJECTIVE DATA SUMMARY:   HISTORY:    Past Medical History:   Diagnosis Date    CAD (coronary artery disease), native coronary artery      RCA    Creatinine elevation 2020    H/O aortic valve stenosis     post valve replacement    Hypertension     Liver disease     Hepatits A in 1980s    Pacemaker     Raynaud's disease     SSS (sick sinus syndrome) ([facility] [address]     pacemaker    Valvular heart disease      Past Surgical History:   Procedure Laterality Date    HX AORTIC VALVE REPLACEMENT      HX PACEMAKER      VASCULAR SURGERY PROCEDURE UNLIST  03/2021    to legs       Personal factors and/or comorbidities impacting plan of care:     Home Situation  Home Environment: Private residence  # Steps to Enter: 4  Rails to Enter: Yes  Hand Rails : Bilateral  One/Two Story Residence: One story  Living Alone: No  Support Systems: Spouse/Significant Other, Child(prasanna)  Patient Expects to be Discharged to[de-identified] Rehabilitation facility  Current DME Used/Available at Home: Braulio Crown, straight, Walker, rolling    EXAMINATION/PRESENTATION/DECISION MAKING:   Critical Behavior:  Neurologic State: Alert  Orientation Level: Oriented X4        Hearing:   Auditory  Auditory Impairment: None    Range Of Motion:  AROM: Generally decreased, functional Strength:    Strength: Generally decreased, functional          Functional Mobility:  Bed Mobility:   Not tested           Transfers:  Sit to Stand: Contact guard assistance  Stand to Sit: Contact guard assistance                       Balance:   Sitting: Intact  Standing: Impaired  Standing - Static: Constant support; Fair  Standing - Dynamic : Constant support;Poor  Ambulation/Gait Training:  Distance (ft): 4 Feet (ft)  Assistive Device: Gait belt;Walker, rolling  Ambulation - Level of Assistance: Contact guard assistance     Gait Description (WDL): Exceptions to WDL  Gait Abnormalities: Antalgic;Decreased step clearance; Step to gait  Right Side Weight Bearing: Non-weight bearing  Left Side Weight Bearing: As tolerated  Base of Support: Narrowed     Speed/Anisha: Pace decreased (<100 feet/min); Slow          Pain Rating:  No c/o pain    Activity Tolerance:   Fair and requires rest breaks    After treatment patient left in no apparent distress:   Sitting in chair, Call bell within reach, and Caregiver / family present    COMMUNICATION/EDUCATION:   The patients plan of care was discussed with: Physical therapist and Registered nurse. Fall prevention education was provided and the patient/caregiver indicated understanding., Patient/family have participated as able in goal setting and plan of care. , and Patient/family agree to work toward stated goals and plan of care.     Thank you for this referral.  Salma Garcia, PT, DPT   Time Calculation: 24 mins

## 2021-10-07 NOTE — PROGRESS NOTES
Vascular Surgery Progress Note  Renu Kern ACNP-BC  10/7/2021       Subjective:     Davin Morales is a frail elderly 80 y.o.  male with a pmhx significant for ASHD, HTN, SSS s/p PPM placement, CKD, and Raynaud's disease. He continues to smoke. He has known PAD and is s/p right popliteal atherectomy/BAP/stenting and posterior tibial atherectomy/BAP 9/16/2021 for gangrene of the right second toe. Since the procedure he underwent amputation of the toe. He returned to the clinic 10/5/2021 with dehiscence of his surgical wound. He had purulent drainage of his surgical incision. He had a right posterior heel ulceration. He  presented in a left leg unna boot which was removed and he was found to have multiple ulcerations of the left shin with purulent drainage. His ABIs post procedure of the RLE were right 1.09 which was improved; however, his TBI was 0.0. His left RAVINDRA prior to Unna boot placement was 0.85 and now is 0.56. He failed outpatient treatment with oral Levaquin. Overnight he was agitated and frequently getting out of bed. He is noncompliant with his weightbearing status. He was hypertensive. Nursing Data:     Patient Vitals for the past 24 hrs:   BP Temp Pulse Resp SpO2   10/07/21 0922 121/79 97.5 °F (36.4 °C) 64 18    10/07/21 0257 125/72 97.5 °F (36.4 °C) 64 18 100 %   10/06/21 2258 (!) 193/101 97.6 °F (36.4 °C) 64 18 100 %   10/06/21 2017 (!) 176/90 97.4 °F (36.3 °C) 65 18 90 %   10/06/21 1536 (!) 179/93 97.3 °F (36.3 °C) 91 18 93 %   10/06/21 1356 (!) 178/97 97.4 °F (36.3 °C) 65 18 100 %         Intake/Output Summary (Last 24 hours) at 10/7/2021 1044  Last data filed at 10/7/2021 0448  Gross per 24 hour   Intake 400 ml   Output 50 ml   Net 350 ml       Exam:     Physical Exam  Constitutional:       Comments: Pale, frail, elderly thin  male in no acute distress   HENT:      Head: Normocephalic.       Nose: Nose normal.      Mouth/Throat:      Mouth: Mucous membranes are moist.   Cardiovascular:      Rate and Rhythm: Normal rate and regular rhythm. Pulses:           Femoral pulses are 2+ on the right side and 2+ on the left side. Popliteal pulses are 1+ on the right side and 0 on the left side. Dorsalis pedis pulses are 0 on the right side and 0 on the left side. Posterior tibial pulses are 0 on the right side and 0 on the left side. Pulmonary:      Effort: Pulmonary effort is normal. No respiratory distress. Abdominal:      General: Abdomen is flat. There is no distension. Musculoskeletal:         General: Normal range of motion. Cervical back: Normal range of motion. Right lower leg: Edema  improved. Left lower leg: Edema  improved. Skin:     Coloration: Skin is pale. Comments: Right foot: Ulceration of the right anterior heel. Dehiscence of second digit toe amputation site with purulent drainage and surrounding necrosis. Left leg: Multiple ulcerations of the left shin with purulent drainage. Neurological:      Mental Status: Mental status is at baseline. Psychiatric:         Mood and Affect: Mood normal.         Behavior: He is easily agitated. Lab Review:     . Recent Results (from the past 24 hour(s))   CBC WITH AUTOMATED DIFF    Collection Time: 10/06/21 10:46 AM   Result Value Ref Range    WBC 6.5 4.1 - 11.1 K/uL    RBC 3.16 (L) 4.10 - 5.70 M/uL    HGB 9.3 (L) 12.1 - 17.0 g/dL    HCT 30.3 (L) 36.6 - 50.3 %    MCV 95.9 80.0 - 99.0 FL    MCH 29.4 26.0 - 34.0 PG    MCHC 30.7 30.0 - 36.5 g/dL    RDW 15.0 (H) 11.5 - 14.5 %    PLATELET 087 461 - 869 K/uL    MPV 9.8 8.9 - 12.9 FL    NRBC 0.0 0  WBC    ABSOLUTE NRBC 0.00 0.00 - 0.01 K/uL    NEUTROPHILS 75 32 - 75 %    LYMPHOCYTES 13 12 - 49 %    MONOCYTES 10 5 - 13 %    EOSINOPHILS 1 0 - 7 %    BASOPHILS 1 0 - 1 %    IMMATURE GRANULOCYTES 0 0.0 - 0.5 %    ABS. NEUTROPHILS 4.8 1.8 - 8.0 K/UL    ABS. LYMPHOCYTES 0.8 0.8 - 3.5 K/UL    ABS.  MONOCYTES 0.7 0.0 - 1.0 K/UL    ABS. EOSINOPHILS 0.1 0.0 - 0.4 K/UL    ABS. BASOPHILS 0.0 0.0 - 0.1 K/UL    ABS. IMM. GRANS. 0.0 0.00 - 0.04 K/UL    DF AUTOMATED     METABOLIC PANEL, COMPREHENSIVE    Collection Time: 10/06/21 10:46 AM   Result Value Ref Range    Sodium 136 136 - 145 mmol/L    Potassium 4.3 3.5 - 5.1 mmol/L    Chloride 103 97 - 108 mmol/L    CO2 33 (H) 21 - 32 mmol/L    Anion gap 0 (L) 5 - 15 mmol/L    Glucose 100 65 - 100 mg/dL    BUN 37 (H) 6 - 20 MG/DL    Creatinine 1.97 (H) 0.70 - 1.30 MG/DL    BUN/Creatinine ratio 19 12 - 20      GFR est AA 39 (L) >60 ml/min/1.73m2    GFR est non-AA 32 (L) >60 ml/min/1.73m2    Calcium 8.0 (L) 8.5 - 10.1 MG/DL    Bilirubin, total 0.6 0.2 - 1.0 MG/DL    ALT (SGPT) 10 (L) 12 - 78 U/L    AST (SGOT) 13 (L) 15 - 37 U/L    Alk. phosphatase 68 45 - 117 U/L    Protein, total 6.4 6.4 - 8.2 g/dL    Albumin 2.5 (L) 3.5 - 5.0 g/dL    Globulin 3.9 2.0 - 4.0 g/dL    A-G Ratio 0.6 (L) 1.1 - 2.2     CULTURE, BLOOD, PAIRED    Collection Time: 10/06/21 11:49 AM    Specimen: Blood   Result Value Ref Range    Special Requests: NO SPECIAL REQUESTS      Culture result: NO GROWTH AFTER 18 HOURS     LACTIC ACID    Collection Time: 10/06/21 11:49 AM   Result Value Ref Range    Lactic acid 0.6 0.4 - 2.0 MMOL/L   CULTURE, WOUND W GRAM STAIN    Collection Time: 10/06/21 12:52 PM    Specimen: Foot;  Wound   Result Value Ref Range    Special Requests: NO SPECIAL REQUESTS      GRAM STAIN NO WBC'S SEEN      GRAM STAIN 4+ GRAM NEGATIVE RODS      GRAM STAIN 1+ APPARENT GRAM POSITIVE COCCI IN PAIRS      Culture result: PENDING    EKG, 12 LEAD, INITIAL    Collection Time: 10/06/21  1:06 PM   Result Value Ref Range    Ventricular Rate 78 BPM    Atrial Rate 46 BPM    QRS Duration 190 ms    Q-T Interval 506 ms    QTC Calculation (Bezet) 576 ms    Calculated R Axis -65 degrees    Calculated T Axis 111 degrees    Diagnosis       Ventricular-paced rhythm with frequent premature ventricular complexes  No previous ECGs available  Confirmed by Margo Lim ([id number]) on 10/6/2021 3:24:99 PM     METABOLIC PANEL, BASIC    Collection Time: 10/07/21  3:09 AM   Result Value Ref Range    Sodium 135 (L) 136 - 145 mmol/L    Potassium 4.7 3.5 - 5.1 mmol/L    Chloride 103 97 - 108 mmol/L    CO2 26 21 - 32 mmol/L    Anion gap 6 5 - 15 mmol/L    Glucose 94 65 - 100 mg/dL    BUN 33 (H) 6 - 20 MG/DL    Creatinine 1.79 (H) 0.70 - 1.30 MG/DL    BUN/Creatinine ratio 18 12 - 20      GFR est AA 44 (L) >60 ml/min/1.73m2    GFR est non-AA 36 (L) >60 ml/min/1.73m2    Calcium 8.9 8.5 - 10.1 MG/DL   CBC W/O DIFF    Collection Time: 10/07/21  3:09 AM   Result Value Ref Range    WBC 7.4 4.1 - 11.1 K/uL    RBC 3.27 (L) 4.10 - 5.70 M/uL    HGB 9.8 (L) 12.1 - 17.0 g/dL    HCT 31.3 (L) 36.6 - 50.3 %    MCV 95.7 80.0 - 99.0 FL    MCH 30.0 26.0 - 34.0 PG    MCHC 31.3 30.0 - 36.5 g/dL    RDW 14.9 (H) 11.5 - 14.5 %    PLATELET 613 168 - 965 K/uL    MPV 10.1 8.9 - 12.9 FL    NRBC 0.0 0  WBC    ABSOLUTE NRBC 0.00 0.00 - 0.01 K/uL          Assessment/Plan:     Peripheral artery arterial disease with ulceration of the bilateral lower extremities  ABIs 10/5: Right 1.09 and left 0.56. TBI's flatline bilateral.  Following recent RLE intervention. · Obtain RLE duplex and vein mapping today.      Cellulitis of the bilateral lower extremities secondary to ulcerations. Patient did not meet SIRS criteria on arrival.  Failed outpatient treatment with oral antibiotics.  -Wound culture is polymicrobial: 4+ gram-negative rods and gram-positive cocci. · Initiated on broad-spectrum IV antibiotics after culture obtained from surgical wound dehiscence. · Consulted wound care for management of left shin wounds. Awaiting their recommendations. · Consulted podiatry for management of the right foot wound. Awaiting his recommendations. · Modify pain regimen for improved control.     Coronary artery disease  Hypertension  -Labile. Continue home dose of beta-blocker. Continue as needed hydralazine that was initiated overnight. Pain likely contributing factor. Modified regimen for improved control. Hyperlipidemia  -Statin intolerance  SSS  -S/p PPM placement  -Rate currently controlled  Aortic valve stenosis  -S/p aortic valve replacement      Chronic renal disease stage III-IV  Baseline creatinine 1.7-2.0  @ baseline  Anemia of chronic renal disease  Stable     Tobacco use   -Smoking sensation EDU completed  -Nicotine patch ordered     General debility  · Float heels at all time  · Turn every 2 hours  · Air mattress  · Out of bed daily with assistance and nonweightbearing on the right foot. Weightbearing as tolerated left foot. Bedside commode privileges. · PT consulted     VTE prophylaxis:  SQ held for possible preprocedural intervention with podiatry  SCDs contraindicated in diffuse PAD     Disposition: To be determined. Likely home with home health.

## 2021-10-07 NOTE — PROGRESS NOTES
End of Shift Note    Bedside shift change report given to Huseyin Sofia (oncoming nurse) by Alcides Ramirez RN (offgoing nurse).   Report included the following information SBAR, Kardex, Intake/Output, MAR and Recent Results    Shift worked:  7a-7p     Shift summary and any significant changes:     wound care completed, pt had a BM, up to chair     Concerns for physician to address:  none     Zone phone for oncoming shift:   [phone]

## 2021-10-07 NOTE — CONSULTS
Podiatry Consult    Subjective: Patient had amputation of right 2nd toe by Dr. Eddie Gresham last week. When he presented to see Dr. Antony Huerta, the amputation site was noted to be necrotic and infected. He was admitted for IV ABX and further intervention. Date of Consultation: October 7, 2021     Referring Physician: Dr. Antony Huerta MD    Patient is a 80 y.o.  male who is being seen for gangrenous Sx site right foot. 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    Pacemaker     Raynaud's disease     SSS (sick sinus syndrome) ([address]     pacemaker    Valvular heart disease       Past Surgical History:   Procedure Laterality Date    HX AORTIC VALVE REPLACEMENT      HX PACEMAKER      VASCULAR SURGERY PROCEDURE UNLIST  03/2021    to legs      Family History   Problem Relation Age of Onset    Stroke Mother     Stroke Father     Cancer Sister     Heart Disease Brother         MI      Social History     Tobacco Use    Smoking status: Current Every Day Smoker     Packs/day: 0.25     Years: 30.00     Pack years: 7.50     Types: Cigarettes    Smokeless tobacco: Never Used   Substance Use Topics    Alcohol use:  Yes     Alcohol/week: 1.0 standard drinks     Types: 1 Glasses of wine per week     Comment: weekly     Current Facility-Administered Medications   Medication Dose Route Frequency Provider Last Rate Last Admin    vancomycin (VANCOCIN) 500 mg in 0.9% sodium chloride (MBP/ADV) 100 mL MBP  500 mg IntraVENous Q18H Татьяна Rain,  mL/hr at 10/07/21 1145 500 mg at 10/07/21 1145    cefepime (MAXIPIME) 2 g in 0.9% sodium chloride (MBP/ADV) 100 mL MBP  2 g IntraVENous Q24H Cecy Lance P,  mL/hr at 10/07/21 1415 2 g at 10/07/21 1415    L.acidophilus-paracasei-S.thermophil-bifidobacter (RISAQUAD) 8 billion cell capsule  1 Capsule Oral DAILY Ranelle Check, NP   1 Capsule at 10/07/21 1030    HYDROmorphone (DILAUDID) tablet 1 mg  1 mg Oral Q4H PRN Ranelle Check, NP        acetaminophen (TYLENOL) tablet 650 mg  650 mg Oral Q6H Татьяна Rain P, NP   650 mg at 10/07/21 1848    melatonin tablet 6 mg  6 mg Oral QHS Ranelle Check, NP   6 mg at 10/06/21 2251    finasteride (PROSCAR) tablet 5 mg  5 mg Oral DAILY Ranelle Check, NP        aspirin delayed-release tablet 81 mg  81 mg Oral DAILY Ranelle Check, NP   81 mg at 10/07/21 1030    metoprolol tartrate (LOPRESSOR) tablet 25 mg  25 mg Oral DAILY Татьяна Bloom P, NP   25 mg at 10/07/21 1029    ondansetron (ZOFRAN) injection 4 mg  4 mg IntraVENous Q6H PRN Ranelle Check, NP        hydrALAZINE (APRESOLINE) 20 mg/mL injection 10 mg  10 mg IntraVENous Q4H PRN Jose Rafael Tierney MD   10 mg at 10/06/21 2048      Allergies   Allergen Reactions    Codeine Other (comments)      pt states that he became high with morphine--yrs ago   pt states that he became high with morphine--yrs ago      Simvastatin Myalgia and Other (comments)     And weakness  And weakness          Review of Systems:  AO x4. NAD    Objective:     Patient Vitals for the past 8 hrs:   BP Temp Pulse Resp SpO2   10/07/21 1636 (!) 141/88 97.8 °F (36.6 °C) 65 18 92 %   10/07/21 1125 (!) 136/96 97.7 °F (36.5 °C)  18 92 %     Temp (24hrs), Av.6 °F (36.4 °C), Min:97.4 °F (36.3 °C), Max:97.8 °F (36.6 °C)      Physical Exam Lower Extremities:    DP and PT non-palpable bilaterally. CFT reduced all toe  Sensation intact to light touch.   Sutures in place at amputation site right 2nd toe, but skin is wet and necrotic with new pustule formation plantar lateral to incision. Skin is atrophic and slightly discolored bilateral feet. Dry eschars plantar right heel, medial left hallux, lateral left 2nd toe. Open, moist, clean wound left 5th toe amputation site. Right 2nd toe absent at base of toe. Left 5th toe partially absent. X-rays right foot: Prior resection 2nd toe at mid-shaft proximal phalanx. No bone erosion, no soft tissue gas.     Lab Review:   Recent Results (from the past 24 hour(s))   METABOLIC PANEL, BASIC    Collection Time: 10/07/21  3:09 AM   Result Value Ref Range    Sodium 135 (L) 136 - 145 mmol/L    Potassium 4.7 3.5 - 5.1 mmol/L    Chloride 103 97 - 108 mmol/L    CO2 26 21 - 32 mmol/L    Anion gap 6 5 - 15 mmol/L    Glucose 94 65 - 100 mg/dL    BUN 33 (H) 6 - 20 MG/DL    Creatinine 1.79 (H) 0.70 - 1.30 MG/DL    BUN/Creatinine ratio 18 12 - 20      GFR est AA 44 (L) >60 ml/min/1.73m2    GFR est non-AA 36 (L) >60 ml/min/1.73m2    Calcium 8.9 8.5 - 10.1 MG/DL   CBC W/O DIFF    Collection Time: 10/07/21  3:09 AM   Result Value Ref Range    WBC 7.4 4.1 - 11.1 K/uL    RBC 3.27 (L) 4.10 - 5.70 M/uL    HGB 9.8 (L) 12.1 - 17.0 g/dL    HCT 31.3 (L) 36.6 - 50.3 %    MCV 95.7 80.0 - 99.0 FL    MCH 30.0 26.0 - 34.0 PG    MCHC 31.3 30.0 - 36.5 g/dL    RDW 14.9 (H) 11.5 - 14.5 %    PLATELET 594 646 - 752 K/uL    MPV 10.1 8.9 - 12.9 FL    NRBC 0.0 0  WBC    ABSOLUTE NRBC 0.00 0.00 - 0.01 K/uL   DUPLEX LOWER EXT ARTERY RIGHT    Collection Time: 10/07/21  1:55 PM   Result Value Ref Range    Right CFA dist sys .0 cm/s    Right Prox PFA A .8 cm/s    Right super femoral dist sys PSV 59.8 cm/s    Right super femoral mid sys .8 cm/s    Right super femoral prox sys PSV 99.1 cm/s    Right popliteal dist sys PSV 34.2 cm/s    Right popliteal prox sys PSV 50.3 cm/s    Right Dist PTA .9 cm/s    Right mid PTA sys .8 cm/s    Right Prox PTA .0 cm/s    Right Dist Peroneal Velocity 0.0 cm/s    Right mid peroneal sys PSV 0.0 cm/s    Right prox peroneal sys PSV 0.0 cm/s    Right Dist NATALYA Velocity 0.0 cm/s    Right Mid NATALYA Velocity 0.0 cm/s    Right Prox NATALYA Velocity 0.0 cm/s    Right SFA Prox Sanjay Ratio 0.4     Right SFA Mid Sanjay Ratio 1.7     Right SFA Dist Sanjay Ratio 0.36     Right Pop A Prox Sanjay Ratio 0.84        Impression:   1. PAD  2. Non-healing surgical site right 2nd toe  3. Multiple ischemic ulcers bilateral feet    Recommendation:   Case discussed in depth with Dr. Magui Quezada and communicated with patient. Right foot perfusion very poor beyond the rear foot. Options are for BKA or attempt TMA with some risk of failure. Patient somewhat taken aback by the situation, so I will check with him again tomorrow about a decision. Dressings changed and wound care orders placed for the foot ulcers.

## 2021-10-08 NOTE — PROGRESS NOTES
Problem: Mobility Impaired (Adult and Pediatric)  Goal: *Acute Goals and Plan of Care (Insert Text)  Description: FUNCTIONAL STATUS PRIOR TO ADMISSION: Patient was modified independent using a single point cane for functional mobility. Wife reports that he was very unsteady but refused to use the RW at home. HOME SUPPORT PRIOR TO ADMISSION: The patient lived with wife but did not require assist.    Physical Therapy Goals  Initiated 10/7/2021  1. Patient will move from supine to sit and sit to supine  in bed with modified independence within 7 day(s). 2.  Patient will transfer from bed to chair and chair to bed with contact guard assist using the least restrictive device within 7 day(s). 3.  Patient will perform sit to stand with contact guard assist within 7 day(s). 4.  Patient will ambulate with minimal assistance for 20 feet with the least restrictive device within 7 day(s). Outcome: Progressing Towards Goal     PHYSICAL THERAPY TREATMENT  Patient: Susie Altman (32 y.o. male)  Date: 10/8/2021  Diagnosis: Cellulitis of left foot [L03.116] <principal problem not specified>       Precautions: Fall (NWB R LE, WBAT L LE)  Chart, physical therapy assessment, plan of care and goals were reviewed. ASSESSMENT  Patient continues with skilled PT services and is progressing towards goals. Patient verbalized understanding of right NWB this date  without prompting but presented with difficulty differentiating between L vs R NWB during session. He remains limited by impaired strength and standing balance limiting his ability to maintain NWB in stance. Attempted hopping forward/back and laterally multiple times requiring min-moderate assist each time. Patient able to briefly maintain the right LE off of the ground but fatigued quickly and presented with LOBs when attempting to clear the left LE. He was, however, able to maintain NWB when laterally scooting to [facility] post session.  Discussed temporary function at the wheelchair level depending on his WB status going forward. Recommend discharge to a rehab setting when medically stable due to poor functional status and his wife's likely in ability to meet his needs for physical assist.    Current Level of Function Impacting Discharge (mobility/balance): min-mod sit to stand and hopping    Other factors to consider for discharge: patient considering additional surgical intervention         PLAN :  Patient continues to benefit from skilled intervention to address the above impairments. Continue treatment per established plan of care. to address goals. Recommendation for discharge: (in order for the patient to meet his/her long term goals)  Therapy up to 5 days/week in SNF setting        IF patient discharges home will need the following DME: to be determined (TBD)       SUBJECTIVE:   Patient stated I can't put any weight on the right leg.     OBJECTIVE DATA SUMMARY:   Critical Behavior:  Neurologic State: Alert  Orientation Level: Oriented X4        Functional Mobility Training:  Bed Mobility:           Scooting: Contact guard assistance        Transfers:  Sit to Stand: Minimum assistance; Moderate assistance (while maintaining NWB)  Stand to Sit: Contact guard assistance                             Balance:  Sitting: Intact  Standing: Impaired  Standing - Static: Constant support; Fair  Standing - Dynamic : Constant support;Poor  Ambulation/Gait Training:  Distance (ft): 2 Feet (ft)  Assistive Device: Gait belt;Walker, rolling  Ambulation - Level of Assistance: Minimal assistance        Gait Abnormalities: Decreased step clearance        Base of Support: Narrowed     Speed/Anisha: Pace decreased (<100 feet/min)                   Therapeutic Exercises:   Seated LAQs, marching x10 each; fatigued quickly with left LAQs        Activity Tolerance:   Fair    After treatment patient left in no apparent distress:   Sitting in chair and Call bell within reach    COMMUNICATION/COLLABORATION:   The patients plan of care was discussed with: Registered nurse.      Alverto Sharp PT, DPT   Time Calculation: 27 mins

## 2021-10-08 NOTE — PROGRESS NOTES
End of Shift Note    Bedside shift change report given to Marely Rico RN (oncoming nurse) by Lolis Wilburn RN (offgoing nurse). Report included the following information SBAR, Kardex, Intake/Output, MAR and Recent Results    Shift worked: 12 noon to 7 pm     Shift summary and any significant changes:     Wound dressings changed today except left shin, which is due tomorrow. Case management consult for home PT. Family meeting was done tonight via telephone with both sons and Dr. Johan Figueroa. Concerns for physician to address:       Zone phone for oncoming shift:          Activity:  Activity Level: Up with Assistance, Dangle Side of Bed  Number times ambulated in hallways past shift: 0  Number of times OOB to chair past shift: 2, dangle on side of bed to eat. Cardiac:   Cardiac Monitoring: No           Access:   Current line(s): PIV     Genitourinary:   Urinary status: voiding    Respiratory:   O2 Device: None (Room air)  Chronic home O2 use?: NO  Incentive spirometer at bedside: NO     GI:  Last Bowel Movement Date: 10/08/21  Current diet:  ADULT DIET Regular; Low Fat/Low Chol/High Fiber/ANGELICA  Passing flatus: YES  Tolerating current diet: YES       Pain Management:   Patient states pain is manageable on current regimen: YES    Skin:  Flaco Score: 17  Interventions: increase time out of bed    Patient Safety:  Fall Score:  Total Score: 2  Interventions: gripper socks       Length of Stay:  Expected LOS: 3d 9h  Actual LOS: 2      Llois Wilburn RN

## 2021-10-08 NOTE — PROGRESS NOTES
End of Shift Note    Bedside shift change report given to Tommy Mckeon RN (oncoming nurse) by Cheyenne Murrell RN (offgoing nurse). Report included the following information SBAR, Kardex, Intake/Output, MAR and Recent Results    Shift worked:  7A-12p     Shift summary and any significant changes:    Wound care completed. Patient can be noncompliant with NWB status-frequent reminding needed. Family conference at 5 pm today per patient to determine decision making regarding R foot. Patient sat up in the chair most of the morning. Concerns for physician to address: None      Zone phone for oncoming shift:  [phone]     Activity:  Activity Level: Up with Assistance (out of chair daily)  Number times ambulated in hallways past shift: 0  Number of times OOB to chair past shift: 3    Cardiac:   Cardiac Monitoring: No           Access:   Current line(s): PIV     Genitourinary:   Urinary status: voiding    Respiratory:   O2 Device: None (Room air)  Chronic home O2 use?: NO  Incentive spirometer at bedside: YES     GI:  Last Bowel Movement Date: 10/08/21  Current diet:  ADULT DIET Regular; Low Fat/Low Chol/High Fiber/ANGELICA  Passing flatus: YES  Tolerating current diet: YES       Pain Management:   Patient states pain is manageable on current regimen: YES    Skin:  Flaco Score: 16  Interventions: increase time out of bed    Patient Safety:  Fall Score:  Total Score: 2  Interventions: stay with me (per policy)       Length of Stay:  Expected LOS: 3d 9h  Actual LOS: 2      Vicki Lucero RN

## 2021-10-08 NOTE — PROGRESS NOTES
End of Shift Note    Bedside shift change report given to LÓPEZ Cohen (oncoming nurse) by Earlene Ortiz RN (offgoing nurse). Report included the following information SBAR, Kardex, Intake/Output, MAR and Recent Results    Shift worked:  7p-7a     Shift summary and any significant changes:     Pt up to bedside commode multiple times during this shift. Pt has no complaints of pain. Right and left leg dressings CDI. Concerns for physician to address:  none     Zone phone for oncoming shift:          Activity:  Activity Level: Up with Assistance (out of chair daily)  Number times ambulated in hallways past shift: 0  Number of times OOB to chair past shift: 0    Cardiac:   Cardiac Monitoring: No           Access:   Current line(s): PIV     Genitourinary:   Urinary status: voiding    Respiratory:   O2 Device: None (Room air)  Chronic home O2 use?: NO  Incentive spirometer at bedside: NO     GI:  Last Bowel Movement Date: 10/08/21  Current diet:  ADULT DIET Regular; Low Fat/Low Chol/High Fiber/ANGELICA  Passing flatus: YES  Tolerating current diet: YES       Pain Management:   Patient states pain is manageable on current regimen: YES    Skin:  Flaco Score: 16  Interventions: float heels and PT/OT consult    Patient Safety:  Fall Score:  Total Score: 2  Interventions: assistive device (walker, cane, etc), gripper socks, pt to call before getting OOB and stay with me (per policy)       Length of Stay:  Expected LOS: 3d 9h  Actual LOS: 2      Earlene Ortiz RN

## 2021-10-08 NOTE — PROGRESS NOTES
Problem: Falls - Risk of  Goal: *Absence of Falls  Description: Document Avi Bains Fall Risk and appropriate interventions in the flowsheet.   10/8/2021 1329 by Juan Olmstead RN  Outcome: Progressing Towards Goal  Note: Fall Risk Interventions:  Mobility Interventions: Assess mobility with egress test         Medication Interventions: Patient to call before getting OOB, Teach patient to arise slowly                10/8/2021 1328 by Juan Olmstead RN  Outcome: Progressing Towards Goal  Note: Fall Risk Interventions:  Mobility Interventions: Assess mobility with egress test         Medication Interventions: Patient to call before getting OOB, Teach patient to arise slowly                   Problem: Patient Education: Go to Patient Education Activity  Goal: Patient/Family Education  Outcome: Progressing Towards Goal

## 2021-10-08 NOTE — PROGRESS NOTES
Problem: Falls - Risk of  Goal: *Absence of Falls  Description: Document Benjy Cease Fall Risk and appropriate interventions in the flowsheet.   Outcome: Progressing Towards Goal  Note: Fall Risk Interventions:  Mobility Interventions: Assess mobility with egress test         Medication Interventions: Patient to call before getting OOB, Teach patient to arise slowly                   Problem: Patient Education: Go to Patient Education Activity  Goal: Patient/Family Education  Outcome: Progressing Towards Goal

## 2021-10-08 NOTE — PROGRESS NOTES
Vascular Surgery Progress Note  Tanna Aguila ACNP-BC  10/8/2021       Subjective:     Hillary Palma is a frail elderly 80 y.o.  male with a pmhx significant for ASHD, HTN, SSS s/p PPM placement, CKD, BPH, and Raynaud's disease. He continues to smoke. He has known PAD and is s/p right popliteal atherectomy/BAP/stenting and posterior tibial atherectomy/BAP 9/16/2021 for gangrene of the right second toe. Since the procedure he underwent amputation of the toe. He returned to the clinic 10/5/2021 with dehiscence of his surgical wound. He had purulent drainage of his surgical incision. He had a right posterior heel ulceration. He  Presented with the left leg in an unna boot which was removed. He was found to have multiple ulcerations of the left shin with purulent drainage. His ABIs post procedure of the RLE were right 1.09 which was improved; however, his TBI was 0.0. An arterial duplex this admission shows normal perfusion throughout the RLE to the hindfoot. His left RAVINDRA prior to unna boot placement was 0.85 and now is 0.56. He failed outpatient treatment with oral Levaquin. Overnight he continues to get OOB frequent to use the [facility]. He has BPH and is on Proscar.       Nursing Data:     Patient Vitals for the past 24 hrs:   BP Temp Pulse Resp SpO2   10/08/21 0730 134/88 97.8 °F (36.6 °C) 68 18 96 %   10/08/21 0557 (!) 154/95       10/08/21 0308 (!) 177/94 98 °F (36.7 °C) 67 18 95 %   10/07/21 2310 (!) 166/91 97 °F (36.1 °C) 79 18 94 %   10/07/21 1941 (!) 150/90 97 °F (36.1 °C) 66 18 100 %   10/07/21 1636 (!) 141/88 97.8 °F (36.6 °C) 65 18 92 %   10/07/21 1125 (!) 136/96 97.7 °F (36.5 °C)  18 92 %   10/07/21 0922 121/79 97.5 °F (36.4 °C) 64 18          Intake/Output Summary (Last 24 hours) at 10/8/2021 0846  Last data filed at 10/8/2021 0525  Gross per 24 hour   Intake 340 ml   Output 50 ml   Net 290 ml       Exam:     Physical Exam  Constitutional:       Comments: Pale, frail, elderly thin  male in no acute distress   HENT:      Head: Normocephalic. Nose: Nose normal.      Mouth/Throat:      Mouth: Mucous membranes are moist.   Cardiovascular:      Rate and Rhythm: Normal rate and regular rhythm. Pulses:           Femoral pulses are 2+ on the right side and 2+ on the left side. Popliteal pulses are 1+ on the right side and 0 on the left side. Dorsalis pedis pulses are 0 on the right side and 0 on the left side. Posterior tibial pulses are 0 on the right side and 0 on the left side. Pulmonary:      Effort: Pulmonary effort is normal. No respiratory distress. Abdominal:      General: Abdomen is flat. There is no distension. Musculoskeletal:         General: Normal range of motion. Cervical back: Normal range of motion. Right lower leg: Edema  improved. Left lower leg: Edema  improved. Skin:     Coloration: Skin is pale. Comments: Right foot: Ulceration of the right anterior heel. Dehiscence of second digit toe amputation site with purulent drainage and surrounding necrosis. Left leg: Multiple ulcerations of the left shin with purulent drainage. Neurological:      Mental Status: Mental status is at baseline. Psychiatric:         Mood and Affect: Mood normal.         Behavior: He is easily agitated. Lab Review:     .   Recent Results (from the past 24 hour(s))   DUPLEX LOWER EXT ARTERY RIGHT    Collection Time: 10/07/21  1:55 PM   Result Value Ref Range    Right CFA dist sys .0 cm/s    Right Prox PFA A .8 cm/s    Right super femoral dist sys PSV 59.8 cm/s    Right super femoral mid sys .8 cm/s    Right super femoral prox sys PSV 99.1 cm/s    Right popliteal dist sys PSV 34.2 cm/s    Right popliteal prox sys PSV 50.3 cm/s    Right Dist PTA .9 cm/s    Right mid PTA sys .8 cm/s    Right Prox PTA .0 cm/s    Right Dist Peroneal Velocity 0.0 cm/s    Right mid peroneal sys PSV 0.0 cm/s Right prox peroneal sys PSV 0.0 cm/s    Right Dist NATALYA Velocity 0.0 cm/s    Right Mid NATALYA Velocity 0.0 cm/s    Right Prox NATALYA Velocity 0.0 cm/s    Right SFA Prox Sanjay Ratio 0.4     Right SFA Mid Sanjay Ratio 1.7     Right SFA Dist Sanjay Ratio 0.36     Right Pop A Prox Sanjay Ratio 2.88    METABOLIC PANEL, BASIC    Collection Time: 10/08/21  2:44 AM   Result Value Ref Range    Sodium 134 (L) 136 - 145 mmol/L    Potassium 4.4 3.5 - 5.1 mmol/L    Chloride 103 97 - 108 mmol/L    CO2 28 21 - 32 mmol/L    Anion gap 3 (L) 5 - 15 mmol/L    Glucose 87 65 - 100 mg/dL    BUN 34 (H) 6 - 20 MG/DL    Creatinine 1.82 (H) 0.70 - 1.30 MG/DL    BUN/Creatinine ratio 19 12 - 20      GFR est AA 43 (L) >60 ml/min/1.73m2    GFR est non-AA 35 (L) >60 ml/min/1.73m2    Calcium 8.8 8.5 - 10.1 MG/DL          Assessment/Plan:     Peripheral artery arterial disease with ulceration of the bilateral lower extremities  ABIs 10/5: Right 1.09 and left 0.56. TBI's flatline bilateral.  Following recent RLE intervention. -RLE reveals normal perfusion throughout the RLE to the hindfoot. · No role for vascular procedural intervention of the right lower extremity. Large vessels are patent. Right foot wound likely failed improve due to distal microvascular disease. Patient will need intervention on the left lower extremity once he is recovered from his acute infection of the right foot. · Appreciate input from wound care team regarding the left lower extremity.       Cellulitis of the bilateral lower extremities secondary to ulcerations. Patient did not meet SIRS criteria on arrival.  Failed outpatient treatment with oral antibiotics.  -Wound culture is polymicrobial: 4+ gram-negative rods and Enterococcus. ID and sensitivities pending. · Continue broad-spectrum IV antibiotics for now. Probiotics while on antibiotics. · Case discussed with Dr. Aleman. TMA is recommended. Patient is reluctant to proceed.   Patient and family continue to decide. · Continue current pain regimen.     Hyponatremia  · Continue to follow    Coronary artery disease  Hypertension  -Improved with modified pain regimen. Hyperlipidemia  -Statin intolerance  SSS  -S/p PPM placement  -Rate currently controlled  Aortic valve stenosis  -S/p aortic valve replacement      Chronic renal disease stage III-IV  Baseline creatinine 1.7-2.0  @ baseline  Anemia of chronic renal disease  Stable     Tobacco use   -Smoking sensation EDU completed  -Nicotine patch ordered     General debility  · Float heels at all time  · Turn every 2 hours  · Air mattress  · Out of bed daily with assistance and nonweightbearing on the right foot. Weightbearing as tolerated left foot. Bedside commode privileges. · PT consulted     VTE prophylaxis:  SQ held for possible preprocedural intervention with podiatry  SCDs contraindicated in diffuse PAD     Disposition: To be determined. Likely home with home health.

## 2021-10-08 NOTE — PROGRESS NOTES
If pt is discharged this weekend, please refer to Case Management consult on 10/8 at 1600 for home health orders. Transition of Care Plan:     RUR: 11% - low risk  Disposition: Anticipating home with HH vs SNF  Will need [facility] orders for [facility]. Follow up appointments: PCP, Specialist  DME needed: No needs identified. Pt has RW, cane at home.   Transportation at [address] or means to access home:   yes     IM Medicare Letter: 2nd  Medicare letter to be given prior to discharge.   Is patient a BCPI-A Bundle: No                    If yes, was Bundle Letter given?:  N/A   [address] , spouse, H) [phone], C) [phone]  Discharge Caregiver contacted prior to discharge? yes     Initial Note 4:11 pm: In review of chart, pt is not medically stable for discharge. [facility] CM [facility] prior to admission. Pt will need a NER[facility] orders.  Pt is having a meeting this evening  with Dr. Whitney Ray to discuss his options.      Unit CM will continue to follow.      Meagan Singh, RN, BSN, [facility] Care Manager  [phone]

## 2021-10-09 NOTE — PROGRESS NOTES
End of Shift Note    Bedside shift change report given to LÓPEZ Ruth(oncoming nurse) by Vivien Summers RN (offgoing nurse). Report included the following information SBAR, Kardex and MAR    Shift worked:  7pm-7am     Shift summary and any significant changes:     patient complained of severe pain in right lower leg. Prn dilaudid given for pain management. Prn hydralazine given for elevated bp     Concerns for physician to address:  elevated bp  Something stronger for pain management     Zone phone for oncoming shift:          Activity:  Activity Level: Up with Assistance, Dangle Side of Bed  Number times ambulated in hallways past shift: 0  Number of times OOB to chair past shift: 0    Cardiac:   Cardiac Monitoring: No           Access:   Current line(s): PIV     Genitourinary:   Urinary status: voiding    Respiratory:   O2 Device: None (Room air)  Chronic home O2 use?: NO  Incentive spirometer at bedside:      GI:  Last Bowel Movement Date: 10/08/21  Current diet:  ADULT DIET Regular; Low Fat/Low Chol/High Fiber/ANGELICA  DIET NPO Sips of Water with Meds  Passing flatus: YES  Tolerating current diet: YES       Pain Management:   Patient states pain is manageable on current regimen: YES    Skin:  Flaco Score: 17  Interventions: float heels and increase time out of bed    Patient Safety:  Fall Score:  Total Score: 2  Interventions: gripper socks       Length of Stay:  Expected LOS: 3d 9h  Actual LOS: 3      Vivien Summers RN

## 2021-10-09 NOTE — PROGRESS NOTES
Problem: Pressure Injury - Risk of  Goal: *Prevention of pressure injury  Description: Document Flaco Scale and appropriate interventions in the flowsheet.   Outcome: Progressing Towards Goal  Note: Pressure Injury Interventions:  Sensory Interventions: Assess changes in LOC    Moisture Interventions: Absorbent underpads    Activity Interventions: Increase time out of bed, PT/OT evaluation    Mobility Interventions: Float heels, Assess need for specialty bed, Chair cushion    Nutrition Interventions: Document food/fluid/supplement intake                     Problem: Patient Education: Go to Patient Education Activity  Goal: Patient/Family Education  Outcome: Progressing Towards Goal

## 2021-10-09 NOTE — PROGRESS NOTES
Podiatry    Subjective: Patient had amputation of right 2nd toe by Dr. Blaire Brennan last week. When he presented to see Dr. Clarke Cabello, the amputation site was noted to be necrotic and infected. He was admitted for IV ABX and further intervention. Patient is a 80 y.o.  male who is being seen for gangrenous Sx site right foot. 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    Pacemaker     Raynaud's disease     SSS (sick sinus syndrome) ([facility] [address]     pacemaker    Valvular heart disease       Past Surgical History:   Procedure Laterality Date    HX AORTIC VALVE REPLACEMENT      HX PACEMAKER      VASCULAR SURGERY PROCEDURE UNLIST  03/2021    to legs      Family History   Problem Relation Age of Onset    Stroke Mother     Stroke Father     Cancer Sister     Heart Disease Brother         MI      Social History     Tobacco Use    Smoking status: Current Every Day Smoker     Packs/day: 0.25     Years: 30.00     Pack years: 7.50     Types: Cigarettes    Smokeless tobacco: Never Used   Substance Use Topics    Alcohol use:  Yes     Alcohol/week: 1.0 standard drinks     Types: 1 Glasses of wine per week     Comment: weekly     Current Facility-Administered Medications   Medication Dose Route Frequency Provider Last Rate Last Admin    vancomycin (VANCOCIN) 500 mg in 0.9% sodium chloride (MBP/ADV) 100 mL MBP  500 mg IntraVENous Q18H Tiffany Estrada  mL/hr at 10/08/21 0538 500 mg at 10/08/21 0538    cefepime (MAXIPIME) 2 g in 0.9% sodium chloride (MBP/ADV) 100 mL MBP  2 g IntraVENous Q24H Paresh Leak P,  mL/hr at 10/08/21 1225 2 g at 10/08/21 1225    L.acidophilus-paracasei-S.thermophil-bifidobacter (RISAQUAD) 8 billion cell capsule  1 Capsule Oral DAILY Peggi Ort, NP   1 Capsule at 10/08/21 0801    HYDROmorphone (DILAUDID) tablet 1 mg  1 mg Oral Q4H PRN Peggi Ort, NP        acetaminophen (TYLENOL) tablet 650 mg  650 mg Oral Q6H Татьяна Rain P, NP   650 mg at 10/08/21 1713    melatonin tablet 6 mg  6 mg Oral QHS Peggi Ort, NP   6 mg at 10/07/21 2214    finasteride (PROSCAR) tablet 5 mg  5 mg Oral DAILY Peggi Ort, NP   5 mg at 10/08/21 2804    aspirin delayed-release tablet 81 mg  81 mg Oral DAILY Peggi Ort, NP   81 mg at 10/08/21 0801    metoprolol tartrate (LOPRESSOR) tablet 25 mg  25 mg Oral DAILY Paresh Leak P, NP   25 mg at 10/08/21 0801    ondansetron (ZOFRAN) injection 4 mg  4 mg IntraVENous Q6H PRN Peggi Ort, NP        hydrALAZINE (APRESOLINE) 20 mg/mL injection 10 mg  10 mg IntraVENous Q4H PRN Jose Rafael Tierney MD   10 mg at 10/06/21 2348      Allergies   Allergen Reactions    Codeine Other (comments)      pt states that he became high with morphine--yrs ago   pt states that he became high with morphine--yrs ago      Simvastatin Myalgia and Other (comments)     And weakness  And weakness          Review of Systems:  AO x4. NAD    Objective:     Patient Vitals for the past 8 hrs:   BP Temp Pulse Resp SpO2   10/08/21 2012 120/73 97.7 °F (36.5 °C) 65 17 95 %   10/08/21 1516 119/64 97.7 °F (36.5 °C) 74 16 95 %     Temp (24hrs), Av.7 °F (36.5 °C), Min:97 °F (36.1 °C), Max:98 °F (36.7 °C)      Physical Exam Lower Extremities:    DP and PT non-palpable bilaterally. CFT reduced all toe  Sensation intact to light touch. Sutures in place at amputation site right 2nd toe, but skin is wet and necrotic with new pustule formation plantar lateral to incision.  Skin is atrophic and slightly discolored bilateral feet. Dry eschars plantar right heel, medial left hallux, lateral left 2nd toe. Open, moist, clean wound left 5th toe amputation site. Right 2nd toe absent at base of toe. Left 5th toe partially absent. X-rays right foot: Prior resection 2nd toe at mid-shaft proximal phalanx. No bone erosion, no soft tissue gas. Lab Review:   Recent Results (from the past 24 hour(s))   METABOLIC PANEL, BASIC    Collection Time: 10/08/21  2:44 AM   Result Value Ref Range    Sodium 134 (L) 136 - 145 mmol/L    Potassium 4.4 3.5 - 5.1 mmol/L    Chloride 103 97 - 108 mmol/L    CO2 28 21 - 32 mmol/L    Anion gap 3 (L) 5 - 15 mmol/L    Glucose 87 65 - 100 mg/dL    BUN 34 (H) 6 - 20 MG/DL    Creatinine 1.82 (H) 0.70 - 1.30 MG/DL    BUN/Creatinine ratio 19 12 - 20      GFR est AA 43 (L) >60 ml/min/1.73m2    GFR est non-AA 35 (L) >60 ml/min/1.73m2    Calcium 8.8 8.5 - 10.1 MG/DL       Impression:   1. PAD  2. Non-healing surgical site right 2nd toe  3. Multiple ischemic ulcers bilateral feet    Recommendation:   The patient started a conference call with his sons Doroteo Ramirez and Parth). We then all had a conversation about the details of a TMA, risks of non-healing and the recovery time. Other options such as BKA were discussed, and I advised against simple debridement and wound care for the right foot. All questions were answered. The patient agrees to the right TMA. I will schedule the case for 10/10/21. Orders for consent and NPO after midnight 9/9 entered.

## 2021-10-09 NOTE — PROGRESS NOTES
End of Shift Note    Bedside shift change report given to Jeremie Pollard RN (oncoming nurse) by Jacqueline Love RN (offgoing nurse). Report included the following information SBAR, Kardex, Intake/Output, MAR and Recent Results    Shift worked: 7am  to 7 pm     Shift summary and any significant changes:     NPO after midnight for surgery in am. Consent not signed yet. Dressings to bilateral feet and left shin completed as ordered. Concerns for physician to address:       Zone phone for oncoming shift:          Activity:  Activity Level: Dangle Side of Bed  Number times ambulated in hallways past shift: 0  Number of times OOB to chair past shift: 2, dangle on side of bed to eat. Cardiac:   Cardiac Monitoring: No           Access:   Current line(s): PIV     Genitourinary:   Urinary status: voiding    Respiratory:   O2 Device: None (Room air)  Chronic home O2 use?: NO  Incentive spirometer at bedside: NO     GI:  Last Bowel Movement Date: 10/08/21  Current diet:  ADULT DIET Regular; Low Fat/Low Chol/High Fiber/ANGELICA  DIET NPO Sips of Water with Meds  Passing flatus: YES  Tolerating current diet: YES       Pain Management:   Patient states pain is manageable on current regimen: YES    Skin:  Flaco Score: 19  Interventions: increase time out of bed    Patient Safety:  Fall Score:  Total Score: 2  Interventions: gripper socks       Length of Stay:  Expected LOS: 3d 9h  Actual LOS: [address], RN

## 2021-10-10 NOTE — OP NOTES
[address]  OPERATIVE REPORT    Name:  Shawn Jesus  MR#:  [id number]  :  1933  ACCOUNT #:  [de-identified]  DATE OF SERVICE:  10/10/2021      PREOPERATIVE DIAGNOSIS:  Gangrene, right forefoot. POSTOPERATIVE DIAGNOSIS:  Gangrene, right forefoot. PROCEDURE PERFORMED:  Transmetatarsal amputation, right foot. SURGEON:  Johnny Jefferson DPM    ASSISTANT:  Not applicable. ANESTHESIA TYPE:  IV sedation with right ankle block. COMPLICATIONS:  None. SPECIMENS REMOVED:  1. Transmetatarsal amputation, right foot. 2.  Tissue cultures, right foot. IMPLANTS:  None. ESTIMATED BLOOD LOSS:  Less than 100 mL. PROCEDURE IN DETAIL:  The patient was brought into the operating room and placed supine upon the OR table. After administration of IV sedation, I performed a right ankle block utilizing 30 mL of 0.5% plain Marcaine. The foot was prepped and draped in the usual sterile technique. A time-out was observed. No tourniquet was utilized during the case. Attention was directed to the right foot. A roughly fishmouth-shaped incision was begun at the dorsal right foot near the proximal metaphysis of the metatarsals and this was continued onto the medial and lateral aspects of the foot, and then followed along the first and fifth metatarsal shafts respectively to the distal foot and then met at the plantar right forefoot beneath the metatarsophalangeal joints. The  patient experienced some pain during the incision, so the block was augmented with an additional 10 mL of 0.5% plain Marcaine after which there were no further issues. A Key elevator was used to expose the bone of the metatarsals one through five near the proximal metaphysis.   A power microsagittal saw was used to resect the metatarsals one through five at this level with a 15 degree bevel directed dorsal-distal to plantar-proximal.  A #15 blade was then used to release soft tissue attachments of the dorsal skin and all five of the metatarsals from the plantar flap on the back table. A #15 blade was used to make an incision at the gangrenous site amputation, right second toe and from the deeper in the wound samples of both soft tissue and bone were taken with a rongeur and were placed into aerobic and anaerobic culture tubes for culture and sensitivity. The culture tubes and the amputated forefoot were then passed from the field as specimens. Attention was directed back to the right foot. The surgical site was flushed well with sterile irrigation. Several sites of bleeding were ligated with 3-0 Vicryl. The subcutaneous tissue was then approximated with 3-0 Vicryl and the skin was closed with 3-0 nylon. The foot was cleaned and dried and then wrapped with dry sterile dressings, incorporating Adaptic over the surgical incision. The patient was then transported to the recovery room with vital signs stable and vascular status intact. He will remain until stable for transfer back up to his hospital bed and he should remain essentially nonweightbearing on the right foot for between 3-5 days after which he may ambulate in a pneumatic Cam walker, which I will bring to the hospital for him. I will continue to follow.         Debbie Johnson DPM [id number]  D:  10/10/2021 9:56  T:  10/10/2021 14:01  JOB #:  [id number]

## 2021-10-10 NOTE — BRIEF OP NOTE
Brief Postoperative Note    Patient: Tommy Medellin  YOB: 1933  MRN: [id number]    Date of Procedure: 10/10/2021     Pre-Op Diagnosis: GANGRENE RIGHT FOOT    Post-Op Diagnosis: Same as preoperative diagnosis. Procedure(s):  AMPUTATION TRANSMETATARSAL RIGHT FOOT    Surgeon(s):  Maria Del Carmen Hicks DPM    Surgical Assistant: None    Anesthesia: MAC     Estimated Blood Loss (mL): less than 274     Complications: None    Specimens:   ID Type Source Tests Collected by Time Destination   1 : TRANSMETATARSAL AMPUTATION RIGHT FOOT Preservative   Nubia Thurston DPM 10/10/2021 0900 Pathology   1 : TISSUE CULTURES RIGHT FOOT Tissue Foot, Right CULTURE, ANAEROBIC, CULTURE, TISSUE W Petey Keshawn Thurston DPM 10/10/2021 5202 Microbiology        Implants: * No implants in log *    Drains: * No LDAs found *    Findings:  Moderate bleeding from surgical site    Electronically Signed by Jamia Segundo DPM on 10/10/2021 at 9:44 AM

## 2021-10-10 NOTE — PERIOP NOTES
TRANSFER - IN REPORT:    Verbal report received from [address] RN on Kelle Penaloza  being received from OR for routine progression of care      Report consisted of patients Situation, Background, Assessment and   Recommendations(SBAR). Information from the following report(s) OR Summary, Procedure Summary, Intake/Output and MAR was reviewed with the receiving nurse. Opportunity for questions and clarification was provided. Assessment completed upon patients arrival to unit and care assumed.

## 2021-10-10 NOTE — PROGRESS NOTES
Problem: Falls - Risk of  Goal: *Absence of Falls  Description: Document Juan Prado Fall Risk and appropriate interventions in the flowsheet.   Outcome: Progressing Towards Goal  Note: Fall Risk Interventions:  Mobility Interventions: Assess mobility with egress test         Medication Interventions: Teach patient to arise slowly

## 2021-10-10 NOTE — ANESTHESIA POSTPROCEDURE EVALUATION
Procedure(s):  AMPUTATION TRANSMETATARSAL RIGHT FOOT.    MAC, total IV anesthesia    Anesthesia Post Evaluation      Multimodal analgesia: multimodal analgesia used between 6 hours prior to anesthesia start to PACU discharge  Patient location during evaluation: PACU  Level of consciousness: sleepy but conscious  Pain management: adequate  Airway patency: patent  Anesthetic complications: no  Cardiovascular status: acceptable  Respiratory status: acceptable  Hydration status: acceptable  Comments: +Post-Anesthesia Evaluation and Assessment    Patient: Cara Asa MRN: [id number]  SSN: [id number]   YOB: 1933  Age: 80 y.o. Sex: male      Cardiovascular Function/Vital Signs    BP (!) 88/52   Pulse 65   Temp 36.6 °C (97.8 °F)   Resp 16   Ht 5' 11\" (1.803 m)   Wt 64.4 kg (141 lb 15.6 oz)   SpO2 98%   BMI 19.80 kg/m²     Patient is status post Procedure(s):  AMPUTATION TRANSMETATARSAL RIGHT FOOT. Nausea/Vomiting: Controlled. Postoperative hydration reviewed and adequate. Pain:  Pain Scale 1: Numeric (0 - 10) (10/10/21 0948)  Pain Intensity 1: 0 (10/10/21 0948)   Managed. Neurological Status:   Neuro (WDL): Within Defined Limits (10/10/21 0948)   At baseline. Mental Status and Level of Consciousness: Arousable. Pulmonary Status:   O2 Device: None (10/10/21 0948)   Adequate oxygenation and airway patent. Complications related to anesthesia: None    Post-anesthesia assessment completed. No concerns. Signed By: Simran Vergara MD    10/10/2021  Post anesthesia nausea and vomiting:  controlled  Final Post Anesthesia Temperature Assessment:  Normothermia (36.0-37.5 degrees C)      INITIAL Post-op Vital signs:   Vitals Value Taken Time   /57 10/10/21 1001   Temp 36.6 °C (97.8 °F) 10/10/21 0948   Pulse 72 10/10/21 1006   Resp 27 10/10/21 1006   SpO2 99 % 10/10/21 1006   Vitals shown include unvalidated device data.

## 2021-10-10 NOTE — PERIOP NOTES
TRANSFER - OUT REPORT:    Verbal report given to KIMBERLI DAWN on Tin Lafleur  being transferred to [address] [place] [address] for routine post - op       Report consisted of patients Situation, Background, Assessment and   Recommendations(SBAR). Information from the following report(s) SBAR was reviewed with the receiving nurse. Opportunity for questions and clarification was provided.       Patient transported with:   Registered Nurse

## 2021-10-10 NOTE — PROGRESS NOTES
End of Shift Note    Bedside shift change report given to Avril Ann (oncoming nurse) by Vivien Summers RN (offgoing nurse). Report included the following information SBAR, Kardex and Intake/Output    Shift worked:  7PM-7AM     Shift summary and any significant changes:     Patient scheduled for surgery today. Chg bath given     Concerns for physician to address:  none     Zone phone for oncoming shift:   [phone]       Activity:  Activity Level: Up with Assistance  Number times ambulated in hallways past shift: 0  Number of times OOB to chair past shift: 0    Cardiac:   Cardiac Monitoring: No      Cardiac Rhythm: Ventricular Paced    Access:   Current line(s): PIV     Genitourinary:   Urinary status: voiding    Respiratory:   O2 Device: None (Room air)  Chronic home O2 use?: NO  Incentive spirometer at bedside: YES     GI:  Last Bowel Movement Date: 10/08/21  Current diet:  DIET NPO Sips of Water with Meds  Passing flatus: YES  Tolerating current diet: YES       Pain Management:   Patient states pain is manageable on current regimen: YES    Skin:  Flaco Score: 19  Interventions: increase time out of bed    Patient Safety:  Fall Score:  Total Score: 2  Interventions: gripper socks       Length of Stay:  Expected LOS: 3d 9h  Actual LOS: [address], RN

## 2021-10-10 NOTE — ANESTHESIA PREPROCEDURE EVALUATION
Relevant Problems   CARDIOVASCULAR   (+) CAD (coronary artery disease), native coronary artery   (+) SSS (sick sinus syndrome) (HCC)       Anesthetic History               Review of Systems / Medical History  Patient summary reviewed, nursing notes reviewed and pertinent labs reviewed    Pulmonary          Smoker      Comments: Current Every Day Smoker - 7.5 pack years   Neuro/Psych              Cardiovascular    Hypertension  Valvular problems/murmurs: mitral stenosis and mitral insufficiency      Dysrhythmias   Pacemaker, CAD, PAD and hyperlipidemia  Pertinent negatives: Angina:     Exercise tolerance: <4 METS  Comments: S/P AVR    Sick Sinus Syndrome    ECG (10/6/21): Ventricular-paced rhythm with frequent premature ventricular complexes   No previous ECGs available    TTE (8/24/21):  ·LV: Estimated LVEF is 50 - 55%. Normal cavity size. Mild concentric hypertrophy. Low normal systolic function. ·LA: Moderately dilated left atrium. Left Atrium volume index is 69.47 mL/m2. ·RA: Mildly dilated right atrium. ·AV: Prosthetic aortic valve. Moderate aortic valve sclerosis with no significant stenosis. There is a bioprosthetic aortic valve. ·MV: Mitral valve leaflet calcification. Moderate mitral annular calcification. Mild mitral valve stenosis is present. Mild mitral valve regurgitation is present. ·TV: Mild tricuspid valve regurgitation is present.    GI/Hepatic/Renal       Hepatitis: type A  Renal disease: CRI  Liver disease    Comments: CRI, Stage III    H/O Hepatitis A (1980s) Endo/Other        Anemia     Other Findings   Comments: Gangrene of right foot    Raynaud's disease         Physical Exam    Airway  Mallampati: II  TM Distance: 4 - 6 cm  Neck ROM: normal range of motion   Mouth opening: Normal     Cardiovascular  Regular rate and rhythm,  S1 and S2 normal,  no murmur, click, rub, or gallop             Dental    Dentition: Full upper dentures and Full lower dentures     Pulmonary  Breath sounds clear to auscultation               Abdominal  GI exam deferred       Other Findings            Anesthetic Plan    ASA: 3  Anesthesia type: MAC and total IV anesthesia          Induction: Intravenous  Anesthetic plan and risks discussed with: Patient

## 2021-10-10 NOTE — PERIOP NOTES
TRANSFER - IN REPORT:    Verbal report received from Cathie Christianson RN(name) on Tammie Tatum  being received from 3235(unit) for ordered procedure      Report consisted of patients Situation, Background, Assessment and   Recommendations(SBAR). Information from the following report(s) SBAR, Kardex, ED Summary, OR Summary, Procedure Summary, Intake/Output, MAR, Accordion, Med Rec Status, Alarm Parameters , Pre Procedure Checklist, Procedure Verification and Quality Measures was reviewed with the receiving nurse. Opportunity for questions and clarification was provided. Assessment completed upon patients arrival to unit and care assumed.

## 2021-10-11 NOTE — PROGRESS NOTES
End of Shift Note    Bedside shift change report given to LÓPEZ FINLEY  (oncoming nurse) by Kavitha Evangelista RN (offgoing nurse). Report included the following information SBAR, Kardex and MAR    Shift worked:  7PM-7AM     Shift summary and any significant changes:     POD 1. Dilaudid given for pain management. Dressing had to be reinforced due to breakthrough drainage. Concerns for physician to address:       Zone phone for oncoming shift:          Activity:  Activity Level: Dangle Side of Bed  Number times ambulated in hallways past shift: 0  Number of times OOB to chair past shift: 0    Cardiac:   Cardiac Monitoring: No      Cardiac Rhythm: Ventricular Paced    Access:   Current line(s): PIV     Genitourinary:   Urinary status: voiding    Respiratory:   O2 Device: None (Room air)  Chronic home O2 use?: NO  Incentive spirometer at bedside: YES     GI:  Last Bowel Movement Date: 10/08/21  Current diet:  ADULT DIET Regular; Low Fat/Low Chol/High Fiber/ANGELICA  Passing flatus: NO  Tolerating current diet: YES       Pain Management:   Patient states pain is manageable on current regimen: YES    Skin:  Flaco Score: 19  Interventions: float heels and increase time out of bed    Patient Safety:  Fall Score:  Total Score: 2  Interventions: gripper socks       Length of Stay:  Expected LOS: 3d 9h  Actual LOS: [address], RN

## 2021-10-11 NOTE — PROGRESS NOTES
End of Shift Note    Bedside shift change report given to Liam (oncoming nurse) by Amadou Oconnor (offgoing nurse). Report included the following information SBAR, Kardex, Procedure Summary, Intake/Output, MAR and Recent Results    Shift worked:  7a-7p     Shift summary and any significant changes:     Pt medicated for pain as needed. Worked with PT and is up to chair. Dressings to left leg and toes changed as ordered. Pt received scheduled antibiotics. Tolerating diet with good appetite. Voiding using urinal. Wife at bedside most of shift. Heels floated while pt in bed. Concerns for physician to address:       Zone phone for oncoming shift:          Activity:  Activity Level: Up with Assistance  Number times ambulated in hallways past shift: 0  Number of times OOB to chair past shift: 1    Cardiac:   Cardiac Monitoring: No      Cardiac Rhythm: Ventricular Paced    Access:   Current line(s): PIV     Genitourinary:   Urinary status: voiding    Respiratory:   O2 Device: None (Room air)  Chronic home O2 use?: NO       GI:  Last Bowel Movement Date: 10/08/21  Current diet:  ADULT DIET Regular; Low Fat/Low Chol/High Fiber/ANGELICA  Passing flatus: YES  Tolerating current diet: YES       Pain Management:   Patient states pain is manageable on current regimen: YES    Skin:  Flaco Score: 17  Interventions: float heels and increase time out of bed    Patient Safety:  Fall Score:  Total Score: 3  Interventions: assistive device (walker, cane, etc), gripper socks and pt to call before getting OOB  High Fall Risk: Yes    Length of Stay:  Expected LOS: 3d 9h  Actual LOS: [address] [id number]

## 2021-10-11 NOTE — PROGRESS NOTES
Problem: Mobility Impaired (Adult and Pediatric)  Goal: *Acute Goals and Plan of Care (Insert Text)  Description: FUNCTIONAL STATUS PRIOR TO ADMISSION: Patient was modified independent using a single point cane for functional mobility. Wife reports that he was very unsteady but refused to use the RW at home. HOME SUPPORT PRIOR TO ADMISSION: The patient lived with wife but did not require assist.    Physical Therapy Goals  Initiated 10/7/2021  1. Patient will move from supine to sit and sit to supine  in bed with modified independence within 7 day(s). 2.  Patient will transfer from bed to chair and chair to bed with contact guard assist using the least restrictive device within 7 day(s). 3.  Patient will perform sit to stand with contact guard assist within 7 day(s). 4.  Patient will ambulate with minimal assistance for 20 feet with the least restrictive device within 7 day(s). Outcome: Progressing Towards Goal   PHYSICAL THERAPY TREATMENT  Patient: Faviola Andres (33 y.o. male)  Date: 10/11/2021  Diagnosis: Cellulitis of left foot [L03.116] <principal problem not specified>  Procedure(s) (LRB):  AMPUTATION TRANSMETATARSAL RIGHT FOOT (Right) 1 Day Post-Op  Precautions: Fall (NWB R LE, WBAT L LE)  Chart, physical therapy assessment, plan of care and goals were reviewed. ASSESSMENT  Patient continues with skilled PT services and is progressing towards goals. Pt presents with decreased strength and endurance. Pt performed bed mobility at SBA with additional time. Pt performed sit to stand transfer at min  A with cueing of hand placement. Pt performed bed to chair transfer at min A x2 with RW with cueing for sequencing. Pt able to swivel and hop at times to get to chair. Pt requiring cueing for sequencing. Pt with good UE strength and able to maintain NWB well. Will continue to increased mobility tolerance and strength.       Current Level of Function Impacting Discharge (mobility/balance): bed mobility at SBA, Sit to stand transfer at min A, bed to chair transfer at min A x2     Other factors to consider for discharge: decreased strength and endurance. PLAN :  Patient continues to benefit from skilled intervention to address the above impairments. Continue treatment per established plan of care. to address goals. Recommendation for discharge: (in order for the patient to meet his/her long term goals)  Therapy up to 5 days/week in SNF setting    This discharge recommendation:  Has been made in collaboration with the attending provider and/or case management    IF patient discharges home will need the following DME: patient owns DME required for discharge       SUBJECTIVE:   Patient stated  I'm pretty strong so I know this will get better.     OBJECTIVE DATA SUMMARY:   Critical Behavior:  Neurologic State: Alert  Orientation Level: Oriented X4        Functional Mobility Training:  Bed Mobility:     Supine to Sit: Stand-by assistance; Additional time     Scooting: Stand-by assistance        Transfers:  Sit to Stand: Minimum assistance; Additional time  Stand to Sit: Contact guard assistance                             Balance:  Sitting: Intact  Standing: Impaired; With support  Standing - Static: Fair;Constant support  Standing - Dynamic : Poor;Fair;Constant support  Ambulation/Gait Training:  Distance (ft): 4 Feet (ft)  Assistive Device: Gait belt;Walker, rolling  Ambulation - Level of Assistance: Minimal assistance; Additional time;Assist x2        Gait Abnormalities: Decreased step clearance        Base of Support: Shift to left     Speed/Anisha: Slow;Shuffled              Therapeutic Exercises:   Seated  LAQ x10  Marching x10  Pain Rating:  Pt reported increased pain with mobility.      Activity Tolerance:   Fair and requires rest breaks    After treatment patient left in no apparent distress:   Sitting in chair, Call bell within reach, and Caregiver / family present    COMMUNICATION/COLLABORATION: The patients plan of care was discussed with: Registered nurse.      Jacklyn Nyhan, PTA   Time Calculation: 30 mins

## 2021-10-11 NOTE — PROGRESS NOTES
Please refer to Case Management consult on 10/8 at 1600 for home health orders - they will need to be updated.      Transition of Care Plan:     RUR: 11% - low risk  Disposition: Anticipating home with HH vs SNF  Will need NER[facility]SA orders for [facility].   Follow up appointments: PCP, Specialist  DME needed: No needs identified. Pt has RW, cane at home.   Transportation at [address] or means to access home:   yes     IM Medicare Letter: 2nd  Medicare letter to be given prior to discharge.   Is patient a BCPI-A Bundle: No                    If yes, was Bundle Letter given?:  N/A   [address] , spouse, H) [phone], C) [phone]  Discharge Caregiver contacted prior to discharge? yes    Updated Note 2:30 pm: CM contacted Don Alvarenga (son) -  Per pt's wife request. They are wanting pt to go to SNF. List of SNFs have already been given to wife. He will review it and follow up with CM. Initial Note 11:50 am: In review of chart, pt is not medically stable for discharge. On 10/10, pt had a transmetatarsal amputation of right foot. Unit CM will continue to follow.     Alexander Russo RN, BSN, [address] [place] [address] Care Manager  [phone]

## 2021-10-11 NOTE — PROGRESS NOTES
Vascular Surgery Progress Note  Cheryle Coddington ACNP-BC  10/11/2021       Subjective:     Mak Santos is a frail elderly 80 y.o.  male with a pmhx significant for ASHD, HTN, SSS s/p PPM placement, CKD, BPH, and Raynaud's disease. He continues to smoke. He has known PAD and is s/p right popliteal atherectomy/BAP/stenting and posterior tibial atherectomy/BAP 9/16/2021 for gangrene of the right second toe. Since the procedure he underwent amputation of the toe. He returned to the clinic 10/5/2021 with dehiscence of his surgical wound. He had purulent drainage of his surgical incision. He had a right posterior heel ulceration. He presented with the left leg in an unna boot which was removed. He was found to have multiple ulcerations of the left shin with purulent drainage. His ABIs post procedure of the RLE were right 1.09 which was improved; however, his TBI was 0.0. An arterial duplex this admission shows normal perfusion throughout the RLE to the hindfoot. His left RAVINDRA prior to unna boot placement was 0.85 and now is 0.56. He failed outpatient treatment with oral Levaquin. He is s/p right foot TMA on 10/10/2021 w/ Dr. Tray Jacobo. This am he complained of pain. Nursing Data:     Patient Vitals for the past 24 hrs:   BP Temp Pulse Resp SpO2   10/11/21 1037 (!) 140/82 98 °F (36.7 °C) 65 18 90 %   10/11/21 0750 (!) 141/89 97.9 °F (36.6 °C) 64 18 95 %   10/11/21 0358 (!) 155/83 97.8 °F (36.6 °C) 77 18 94 %   10/10/21 2329 125/77 98.2 °F (36.8 °C) 69 18 95 %   10/10/21 1940 115/68 98.5 °F (36.9 °C) 83 16 96 %   10/10/21 1450 (!) 123/56 97.9 °F (36.6 °C) 64 18 96 %         Intake/Output Summary (Last 24 hours) at 10/11/2021 1353  Last data filed at 10/11/2021 1024  Gross per 24 hour   Intake 720 ml   Output 700 ml   Net 20 ml       Exam:     Physical Exam  Constitutional:       Comments: Pale, frail, elderly thin  male in no acute distress   HENT:      Head: Normocephalic.       Nose: Nose normal.      Mouth/Throat:      Mouth: Mucous membranes are moist.   Cardiovascular:      Rate and Rhythm: Normal rate and regular rhythm. Pulses:           Femoral pulses are 2+ on the right side and 2+ on the left side. Popliteal pulses are 1+ on the right side and 0 on the left side. Dorsalis pedis pulses are 0 on the right side and 0 on the left side. Posterior tibial pulses are 0 on the right side and 0 on the left side. Pulmonary:      Effort: Pulmonary effort is normal. No respiratory distress. Abdominal:      General: Abdomen is flat. There is no distension. Musculoskeletal:         General: Normal range of motion. Cervical back: Normal range of motion. Right lower leg: Edema  improved. Left lower leg: Edema  improved. Skin:     Coloration: Skin is pale. Comments: Right foot: TMA  Left leg: Multiple ulcerations of the left shin with decreased drainage. Neurological:      Mental Status: Mental status is at baseline. Psychiatric:         Mood and Affect: Mood normal.         Behavior: He is easily agitated. Lab Review:     .   Recent Results (from the past 24 hour(s))   METABOLIC PANEL, BASIC    Collection Time: 10/11/21  4:20 AM   Result Value Ref Range    Sodium 134 (L) 136 - 145 mmol/L    Potassium 4.8 3.5 - 5.1 mmol/L    Chloride 106 97 - 108 mmol/L    CO2 28 21 - 32 mmol/L    Anion gap 0 (L) 5 - 15 mmol/L    Glucose 105 (H) 65 - 100 mg/dL    BUN 51 (H) 6 - 20 MG/DL    Creatinine 2.02 (H) 0.70 - 1.30 MG/DL    BUN/Creatinine ratio 25 (H) 12 - 20      GFR est AA 38 (L) >60 ml/min/1.73m2    GFR est non-AA 31 (L) >60 ml/min/1.73m2    Calcium 8.6 8.5 - 10.1 MG/DL   CBC W/O DIFF    Collection Time: 10/11/21  4:20 AM   Result Value Ref Range    WBC 11.5 (H) 4.1 - 11.1 K/uL    RBC 2.95 (L) 4.10 - 5.70 M/uL    HGB 9.1 (L) 12.1 - 17.0 g/dL    HCT 28.1 (L) 36.6 - 50.3 %    MCV 95.3 80.0 - 99.0 FL    MCH 30.8 26.0 - 34.0 PG    MCHC 32.4 30.0 - 36.5 g/dL RDW 15.6 (H) 11.5 - 14.5 %    PLATELET 245 375 - 745 K/uL    MPV 10.3 8.9 - 12.9 FL    NRBC 0.0 0  WBC    ABSOLUTE NRBC 0.00 0.00 - 0.01 K/uL          Assessment/Plan:     Peripheral artery arterial disease with ulceration of the bilateral lower extremities  ABIs 10/5: Right 1.09 and left 0.56. TBI's flatline bilateral.  Following recent RLE intervention. -RLE reveals normal perfusion throughout the RLE to the hindfoot. · No role for vascular procedural intervention of the right lower extremity. Large vessels are patent. Right foot wound likely failed improve due to distal microvascular disease. Patient will need intervention on the left lower extremity once he is recovered from his acute infection of the right foot.       Cellulitis of the bilateral lower extremities secondary to ulcerations.  -Status post right foot TMA 10/10  -Wound culture is polymicrobial: Citrobacter koseri, alcaligenes facecalis, and Enterococcus for faecalis  -Postprocedural mild leukocytosis  · Modify antibiotics based on wound cultures results. Continue cefepime, discontinue vancomycin, and initiate ampicillin. · Wound care of the right foot per Dr. Tray Jacobo  · Wound care of the left leg per recommendations of the wound care team.  · Continue current pain regimen     Chronic renal disease stage III  · Slight increase overnight monitor closely. Discontinue vancomycin. Consult nephrology in the a.m. if fails to improve. Hyponatremia  · Continue to follow  Anemia of chronic disease  -Stable     Coronary artery disease  Hypertension  -Improved with modified pain regimen.   Hyperlipidemia  -Statin intolerance  SSS  -S/p PPM placement  -Rate currently controlled  Aortic valve stenosis  -S/p aortic valve replacement      Tobacco use   -Smoking sensation EDU completed  -Nicotine patch ordered     General debility  · Float heels at all time  · Turn every 2 hours  · Air mattress  · Out of bed daily with assistance and nonweightbearing on the right foot. Weightbearing as tolerated left foot. Bedside commode privileges. · PT consulted     VTE prophylaxis:  [facility]  SCDs contraindicated in diffuse PAD     Disposition:   Home health vs SNF placement. TBD after PT evaluation.

## 2021-10-12 NOTE — PROGRESS NOTES
End of Shift Note    Bedside shift change report given to Gabbi Wilson RN(oncoming nurse) by Jim Bonilla RN (offgoing nurse). Report included the following information SBAR, Kardex, Procedure Summary, Intake/Output, MAR and Recent Results    Shift worked:  7a-7p     Shift summary and any significant changes:     Up to chair with PT,  Left foot dressing changed as ordered, left shin dressing to be changed tomorrow. Right foot dressing intact. NWB on right foot. Up to recliner with PT today, remains up! Concerns for physician to address:       Zone phone for oncoming shift:          Activity:  Activity Level: Recliner, Up with Assistance  Number times ambulated in hallways past shift: 0  Number of times OOB to chair past shift: 1    Cardiac:   Cardiac Monitoring: No      Cardiac Rhythm: Ventricular Paced    Access:   Current line(s): PIV     Genitourinary:   Urinary status: voiding    Respiratory:   O2 Device: None (Room air)  Chronic home O2 use?: NO       GI:  Last Bowel Movement Date: 10/08/21  Current diet:  ADULT DIET Regular; Low Fat/Low Chol/High Fiber/ANGELICA  Passing flatus: YES  Tolerating current diet: YES       Pain Management:   Patient states pain is manageable on current regimen: YES    Skin:  Flaco Score: 19  Interventions: float heels and increase time out of bed    Patient Safety:  Fall Score:  Total Score: 3  Interventions: assistive device (walker, cane, etc), gripper socks and pt to call before getting OOB  High Fall Risk: Yes    Length of Stay:  Expected LOS: 3d 9h  Actual LOS: LÓPEZ Ramirez

## 2021-10-12 NOTE — PROGRESS NOTES
Please refer to Case Management consult on 10/8 at 1600 for home health orders - they will need to be updated.      Transition of Care Plan:     RUR: 14% - low risk  Disposition: SNF  10/12: Referrals sent via [employer] and [employer]. Will need [facility] orders for [facility] - if pt goes home.   Follow up appointments: PCP, Specialist  DME needed: No needs identified. Pt has RW, cane at home.   Transportation at [address] or means to access home:   yes     IM Medicare Letter: 2nd  Medicare letter to be given prior to discharge.   Is patient a BCPI-A Bundle: No                    If yes, was Bundle Letter given?:  N/A   [address] , spouse, H) [phone], C) [phone]  Discharge Caregiver contacted prior to discharge? yes    Initial Note 12:00 pm: In review of chart, pt is not medically stable for discharge. CM was given SNF choices by wife: 1) [facility], 2) [facility], 3) [facility]. Referrals sent. Freedom of choice letter in chart. Unit CM will continue to follow.     Kleber Johnson, RN, BSN, [address] [facility] Care Manager  [phone]

## 2021-10-12 NOTE — PROGRESS NOTES
Problem: Mobility Impaired (Adult and Pediatric)  Goal: *Acute Goals and Plan of Care (Insert Text)  Description: FUNCTIONAL STATUS PRIOR TO ADMISSION: Patient was modified independent using a single point cane for functional mobility. Wife reports that he was very unsteady but refused to use the RW at home. HOME SUPPORT PRIOR TO ADMISSION: The patient lived with wife but did not require assist.    Physical Therapy Goals  Initiated 10/7/2021  1. Patient will move from supine to sit and sit to supine  in bed with modified independence within 7 day(s). 2.  Patient will transfer from bed to chair and chair to bed with contact guard assist using the least restrictive device within 7 day(s). 3.  Patient will perform sit to stand with contact guard assist within 7 day(s). 4.  Patient will ambulate with minimal assistance for 20 feet with the least restrictive device within 7 day(s). Outcome: Progressing Towards Goal   PHYSICAL THERAPY TREATMENT  Patient: Christi Valencia (30 y.o. male)  Date: 10/12/2021    Diagnosis: Cellulitis of left foot [L03.116]     Procedure(s) (LRB): AMPUTATION TRANSMETATARSAL RIGHT FOOT (Right) 2 Days Post-Op    Precautions: Fall (NWB R LE, WBAT L LE)    Chart, physical therapy assessment, plan of care and goals were reviewed. ASSESSMENT: Patient continues with skilled PT services and is progressing towards goals, pt did very well with gait and is compliant with his NWB status, no LOB or SOB, fatigues quickly, does well with bed mob and ther-ex, vc's for safety and proper RW use. Current Level of Function Impacting Discharge (mobility/balance): min assist x2         PLAN : Patient continues to benefit from skilled intervention to address the above impairments. Continue treatment per established plan of care  to address goals.     Recommendation for discharge: (in order for the patient to meet his/her long term goals) Therapy up to 5 days/week in SNF setting    This discharge recommendation: Has been made in collaboration with the attending provider and/or case management    IF patient discharges home will need the following DME: patient owns DME required for discharge     OBJECTIVE DATA SUMMARY:     Critical Behavior:  Neurologic State: Alert  Orientation Level: Oriented X4    Functional Mobility Training:  Bed Mobility:  Supine to Sit: Stand-by assistance; Additional time  Scooting: Stand-by assistance; Additional time  Level of Assistance: Stand-by assistance  Interventions: Verbal cues    Transfers:  Sit to Stand: Minimum assistance;Assist x1;Additional time  Stand to Sit: Minimum assistance;Assist x1  Bed to Chair: Minimum assistance;Assist x2; Additional time  Interventions: Tactile cues; Verbal cues  Level of Assistance: Minimum assistance;Assist x2; Additional time    Balance:  Sitting: Intact; Without support  Standing: Intact; With support  Standing - Static: Good;Constant support  Standing - Dynamic : Fair;Constant support    Ambulation/Gait Training:  Distance (ft): 4 Feet (ft)  Assistive Device: Gait belt;Walker, rolling  Ambulation - Level of Assistance: Minimal assistance;Assist x2; Additional time  Gait Abnormalities: Decreased step clearance  Right Side Weight Bearing: Non-weight bearing  Left Side Weight Bearing: As tolerated  Base of Support: Shift to left  Speed/Anisha: Slow  Step Length: Left shortened    Therapeutic Exercises:     sitting  EXERCISE   Sets   Reps   Active Active Assist   Passive   Comments   Knee ext 1 10 [x] [] [] bilat   Hip flex 1 10 [x] [] [] \"   Abd & Add 1 10 [x] [] [] \"     Pain Rating:     Activity Tolerance: Fair    After treatment patient left in no apparent distress: Sitting in chair, Call bell within reach, and Caregiver / family present    COMMUNICATION/COLLABORATION:   The patients plan of care was discussed with: Registered nurse.      Kimberly Sesay PTA   Time Calculation: 31 mins

## 2021-10-12 NOTE — INTERDISCIPLINARY ROUNDS
Interdisciplinary Rounds were completed on 10/12/21 for this patient. Rounds included nursing, clinical care leader, pharmacy, and case management. Plan of care discussed. See clinical pathway and/or care plan for interventions and desired outcomes.

## 2021-10-12 NOTE — PROGRESS NOTES
Problem: Pressure Injury - Risk of  Goal: *Prevention of pressure injury  Description: Document Flaco Scale and appropriate interventions in the flowsheet.   Outcome: Progressing Towards Goal  Note: Pressure Injury Interventions:  Sensory Interventions: Assess changes in LOC    Moisture Interventions: Absorbent underpads    Activity Interventions: Increase time out of bed, Assess need for specialty bed    Mobility Interventions: Assess need for specialty bed    Nutrition Interventions: Document food/fluid/supplement intake    Friction and Shear Interventions: Lift sheet

## 2021-10-12 NOTE — PROGRESS NOTES
Bedside and Verbal shift change report given to Valerie Cota ,[facility] (oncoming nurse) by Ruth Black RN (offgoing nurse). Report included the following information SBAR, Kardex, Intake/Output, MAR and Recent Results.

## 2021-10-12 NOTE — PROGRESS NOTES
Vascular Surgery Progress Note  Tanna Aguila ACNP-BC  10/12/2021       Subjective:     Bella Stewart a frail DRPDDGR 31 y.o.  male with a pmhx significant for ASHD, HTN, SSS s/p PPM placement, CKD, BPH, and Raynaud's disease. Lauraine Holter continues to smoke. Pina Jay has known PAD and is s/p right popliteal atherectomy/BAP/stenting and posterior tibial atherectomy/BAP 9/16/2021 for gangrene of the right second toe.  Since the procedure he underwent amputation of the toe. Lauraine Holter returned to the clinic 10/5/2021 with dehiscence of his surgical wound. Lauraine Holter had purulent drainage of his surgical incision. He had a right posterior heel ulceration.  He presented with the left leg in an unna boot which was removed. He was found to have multiple ulcerations of the left shin with purulent drainage.  His ABIs following his A-gram on 9/16 were right 1.09 which was improved; however, his TBI was 0.0 and left 0.56 which had decreased from 0.85 following unna boot placement. He is s/p right foot TMA on 10/10/2021 w/ Dr. Reji Ennis.       Nursing Data:     Patient Vitals for the past 24 hrs:   BP Temp Pulse Resp SpO2   10/12/21 0732 (!) 158/78 (!) 96.7 °F (35.9 °C) 65 16 96 %   10/12/21 0427 137/74 98.4 °F (36.9 °C) 69 16 96 %   10/11/21 2037 116/67 98.1 °F (36.7 °C) 65 16    10/11/21 1515 120/75 97.9 °F (36.6 °C) 65 18    10/11/21 1037 (!) 140/82 98 °F (36.7 °C) 65 18 90 %         Intake/Output Summary (Last 24 hours) at 10/12/2021 0936  Last data filed at 10/12/2021 0755  Gross per 24 hour   Intake 240 ml   Output 820 ml   Net -580 ml       Exam:     Physical Exam  Constitutional:       Comments: Pale, frail, elderly thin  male in no acute distress   HENT:      Head: Normocephalic.      Nose: Nose normal.      Mouth/Throat:      Mouth: Mucous membranes are moist.   Cardiovascular:      Rate and Rhythm: Normal rate and regular rhythm.      Pulses:           Femoral pulses are 2+ on the right side and 2+ on the left side.       Popliteal pulses are 1+ on the right side and 0 on the left side.        Dorsalis pedis pulses are 0 on the right side and 0 on the left side.        Posterior tibial pulses are 0 on the right side and 0 on the left side. Pulmonary:      Effort: Pulmonary effort is normal. No respiratory distress. Abdominal:      General: Abdomen is flat. There is no distension. Musculoskeletal:         General: Normal range of motion.      Cervical back: Normal range of motion.      Right lower leg: Edema  significantly improved.      Left lower leg: Edema  significantly improved. Skin:     Coloration: Skin is pale.      Comments: Right foot: TMA w/ dry dressing. Left leg: Multiple ulcerations of the left shin with decreased drainage. Neurological:      Mental Status: Mental status is at baseline. Psychiatric:         Mood and Affect: Mood normal.         Behavior: He is easily agitated. Lab Review:     .   Recent Results (from the past 24 hour(s))   METABOLIC PANEL, BASIC    Collection Time: 10/12/21  4:25 AM   Result Value Ref Range    Sodium 135 (L) 136 - 145 mmol/L    Potassium 4.6 3.5 - 5.1 mmol/L    Chloride 108 97 - 108 mmol/L    CO2 25 21 - 32 mmol/L    Anion gap 2 (L) 5 - 15 mmol/L    Glucose 83 65 - 100 mg/dL    BUN 48 (H) 6 - 20 MG/DL    Creatinine 1.81 (H) 0.70 - 1.30 MG/DL    BUN/Creatinine ratio 27 (H) 12 - 20      GFR est AA 43 (L) >60 ml/min/1.73m2    GFR est non-AA 36 (L) >60 ml/min/1.73m2    Calcium 8.1 (L) 8.5 - 10.1 MG/DL   CBC W/O DIFF    Collection Time: 10/12/21  4:25 AM   Result Value Ref Range    WBC 9.7 4.1 - 11.1 K/uL    RBC 2.72 (L) 4.10 - 5.70 M/uL    HGB 8.0 (L) 12.1 - 17.0 g/dL    HCT 25.8 (L) 36.6 - 50.3 %    MCV 94.9 80.0 - 99.0 FL    MCH 29.4 26.0 - 34.0 PG    MCHC 31.0 30.0 - 36.5 g/dL    RDW 15.7 (H) 11.5 - 14.5 %    PLATELET 332 824 - 497 K/uL    MPV 10.3 8.9 - 12.9 FL    NRBC 0.0 0  WBC    ABSOLUTE NRBC 0.00 0.00 - 0.01 K/uL          Assessment/Plan: Peripheral artery arterial disease with ulceration of the bilateral lower extremities  ABIs 10/5: Right 1.09 and left 0.56.  TBI's flatline bilateral.  Following recent RLE intervention. -RLE duplex reveals normal perfusion throughout the RLE to the hindfoot. · Patient's right foot wound is stable. Awaiting re-evaluation by Dr. Johan Figueroa later today. No role for RLE endovascular intervention. High risk for progression to more proximal amputation to due distal microvascular disease. Left leg wounds are stable. Plan for outpatient LLE A-gram.       Cellulitis of the bilateral lower extremities secondary to ulcerations.  -Status post right foot TMA 10/10  -Wound culture is polymicrobial: Citrobacter koseri, alcaligenes facecalis, and Enterococcus Faecalis  -Surgical wound culture: Enterococcus Faecalis  · Continue Cefepime and Ampicillin. · Wound care of the right foot per Dr. Johan Figueroa  · Wound care of the left leg per recommendations of the wound care team.  · Continue current pain regimen     Chronic renal disease stage III  -Returned to baseline  Hyponatremia  -Improved   Anemia of chronic disease  -Labile but not at a level to transfuse      Coronary artery disease  Hypertension  -Stable   Hyperlipidemia  -Statin intolerance  SSS  -S/p PPM placement  -Rate currently controlled  Aortic valve stenosis  -S/p aortic valve replacement (not on OAG prior to presenting)     Tobacco use   -Smoking sensation EDU completed  -Nicotine patch ordered     General debility  -Air mattress  -PT following   Continue to float heels at all time, turn every 2 hours, OOB daily with assistance and nonweightbearing on the right foot. Weightbearing as tolerated left foot. Bedside commode privileges.     VTE prophylaxis:  [facility]  SCDs contraindicated in diffuse PAD     Disposition:   SNF placement

## 2021-10-13 NOTE — PROGRESS NOTES
Please refer to Case Management consult on 10/8 at 1600 for home health orders - they will need to be updated.      Transition of Care Plan:     RUR: 14% - low risk  Disposition: SNF - [facility]  Will need NERIS[facility] orders for [facility] - if pt goes home.   Follow up appointments: PCP, Specialist  DME needed: No needs identified. Pt has RW, cane at home.   Transportation at [address] or means to access home:   yes     IM Medicare Letter: 2nd  Medicare letter to be given prior to discharge.   Is patient a BCPI-A Bundle: No                    If yes, was Bundle Letter given?:  N/A   [address] , spouse, H) [phone], C) [phone]  Discharge Caregiver contacted prior to discharge? yes    Updated Note 2:30 pm: [facility] [facility] accepted. Pt and wife want to accept the facility. Facility accepted. Updated Note 1:15 pm: CM contacted Cass Fischer from [employer] ([phone]) regarding CAM boot. He is working on it. Updated Note 12:20 pm: CM at bedside to discuss discharge planning with pt and wife. They want to talk to son who will be in around 2pm. CM updated son Ana Sagastume) with findings. Updated Note 11:00 am: CM have called and left 2  for [facility]. No response yet. CM contacted Patti Escamilla, son, with an update. CM received phone call from [facility]. They do not have any available beds at this time. May (not guaranteed but maybe something this weekend). Initial Note 09:00 am:  CM contacted [facility] ([facility]). Left  for admissions to find out if they have bed availability. CM contacted [address] [place] [address] - not accepting pts at current time. [facility] has accepted. In review of chart, pt is not medically stable for discharge. Unit CM will continue to follow.      Nora Newell, RN, BSN, [facility] Care Manager  [phone]

## 2021-10-13 NOTE — PROGRESS NOTES
Vascular Surgery Progress Note  Renu Kern ACNP-BC  10/13/2021       Subjective:     Daisha Thomas a frail [id number] 40 y.o.  male with a pmhx significant for ASHD, HTN, SSS s/p PPM placement, CKD, BPH, and Raynaud's disease. Sadie Salgado continues to smoke. Mindy Cooper has known PAD and is s/p right popliteal atherectomy/BAP/stenting and posterior tibial atherectomy/BAP 9/16/2021 for gangrene of the right second toe.  Since the procedure he underwent amputation of the toe. Sadie Salgado returned to the clinic 10/5/2021 with dehiscence of his surgical wound. Sadie Salgado had purulent drainage of his surgical incision. He had a right posterior heel ulceration.  He presented with the left leg in an unna boot which was removed. He was found to have multiple ulcerations of the left shin with purulent drainage.  His ABIs following his A-gram on 9/16 were right 1.09 which was improved; however, his TBI was 0.0 and left 0.56 which had decreased from 0.85 following unna boot placement. He is s/p right foot TMA on 10/10/2021 w/ Dr. Pedro Saunders. Wound is stable per Dr. Pedro Saunders. Nursing Data:     Patient Vitals for the past 24 hrs:   BP Temp Pulse Resp SpO2   10/13/21 0753 (!) 150/76 97.7 °F (36.5 °C) 65 16 96 %   10/13/21 0357 139/80 97.9 °F (36.6 °C) 65 18 95 %   10/13/21 0011 (!) 161/99 98 °F (36.7 °C) 64 17 96 %   10/12/21 2031 121/83 98.2 °F (36.8 °C) 65 18 96 %   10/12/21 1458 (!) 138/58 97.8 °F (36.6 °C) 64 16 96 %   10/12/21 1144 113/60 97.9 °F (36.6 °C) 63 16 96 %         Intake/Output Summary (Last 24 hours) at 10/13/2021 1112  Last data filed at 10/13/2021 0127  Gross per 24 hour   Intake 820 ml   Output 550 ml   Net 270 ml       Exam:     Physical Exam  Constitutional:       Comments: Pale, frail, elderly thin  male in no acute distress   HENT:      Head: Normocephalic.      Nose: Nose normal.      Mouth/Throat:      Mouth: Mucous membranes are moist.   Cardiovascular:      Rate and Rhythm: Normal rate and regular rhythm.    Pulses:           Femoral pulses are 2+ on the right side and 2+ on the left side.       Popliteal pulses are 1+ on the right side and 0 on the left side.        Dorsalis pedis pulses are 0 on the right side and 0 on the left side.        Posterior tibial pulses are 0 on the right side and 0 on the left side. Pulmonary:      Effort: Pulmonary effort is normal. No respiratory distress. Abdominal:      General: Abdomen is flat. There is no distension. Musculoskeletal:         General: Normal range of motion.      Cervical back: Normal range of motion.      Right lower leg: Edema  significantly improved.      Left lower leg: Edema  significantly improved. Skin:     Coloration: Skin is pale.      Comments: Right foot: TMA w/ dry dressing. Left leg: Multiple ulcerations of the left shin with decreased drainage. Neurological:      Mental Status: Mental status is at baseline. Psychiatric:         Mood and Affect: Mood normal.         Behavior: He is easily agitated. Lab Review:     . Recent Results (from the past 24 hour(s))   METABOLIC PANEL, BASIC    Collection Time: 10/13/21  1:33 AM   Result Value Ref Range    Sodium 135 (L) 136 - 145 mmol/L    Potassium 4.6 3.5 - 5.1 mmol/L    Chloride 106 97 - 108 mmol/L    CO2 26 21 - 32 mmol/L    Anion gap 3 (L) 5 - 15 mmol/L    Glucose 88 65 - 100 mg/dL    BUN 55 (H) 6 - 20 MG/DL    Creatinine 1.95 (H) 0.70 - 1.30 MG/DL    BUN/Creatinine ratio 28 (H) 12 - 20      GFR est AA 40 (L) >60 ml/min/1.73m2    GFR est non-AA 33 (L) >60 ml/min/1.73m2    Calcium 8.4 (L) 8.5 - 10.1 MG/DL          Assessment/Plan:     Peripheral artery arterial disease with ulceration of the bilateral lower extremities  ABIs 10/5: Right 1.09 and left 0.56.  TBI's flatline bilateral.  Following recent RLE intervention. -RLE duplex reveals normal perfusion throughout the RLE to the hindfoot. · Patient's right foot wound is stable. No role for RLE endovascular intervention.   High risk for progression to more proximal amputation to due distal microvascular disease. Left leg wounds are stable. Plan for outpatient LLE A-gram.       Cellulitis of the bilateral lower extremities secondary to ulcerations.  -Status post right foot TMA 10/10  -Wound culture is polymicrobial: Citrobacter koseri, alcaligenes facecalis, and Enterococcus Faecalis  -Surgical wound culture: Enterococcus Faecalis  · Continue Cefepime and Ampicillin. · Wound care of the right foot per Dr. John Jacobo  · Wound care of the left leg per recommendations of the wound care team.  · Continue current pain regimen     Chronic renal disease stage III  -Stable near baseline   Hyponatremia  -Improved   Anemia of chronic disease    Coronary artery disease  Hypertension  -Stable   Hyperlipidemia  -Statin intolerance  SSS  -S/p PPM placement  -Rate currently controlled  Aortic valve stenosis  -S/p aortic valve replacement (not on OAG prior to presenting)     Tobacco use   -Smoking sensation EDU completed  -Nicotine patch ordered     General debility  -Air mattress  -PT following   · Liberalize mobility with NWB right foot until 10/15. On 10/15 place foot in clam boot and patient may ambulate. In the interim continue to turn every 2 hours. Float heels at all times. OOB to chair daily. Encourage IS. VTE prophylaxis:  [facility] hospital  SCDs contraindicated in diffuse PAD     Disposition:   SNF placement    SARS-COV-2 ordered.

## 2021-10-13 NOTE — PROGRESS NOTES
Podiatry    Subjective: Patient is now POD #1 right TMA. He relates a reduction of pain, now just mild unless there is pressure on the foot. 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    Pacemaker     Raynaud's disease     SSS (sick sinus syndrome) ([facility] [address]     pacemaker    Valvular heart disease      Past Surgical History:   Procedure Laterality Date    HX AORTIC VALVE REPLACEMENT      HX PACEMAKER      VASCULAR SURGERY PROCEDURE UNLIST  03/2021    to legs       Family History   Problem Relation Age of Onset    Stroke Mother     Stroke Father     Cancer Sister     Heart Disease Brother         MI      Social History     Tobacco Use    Smoking status: Current Every Day Smoker     Packs/day: 0.25     Years: 30.00     Pack years: 7.50     Types: Cigarettes    Smokeless tobacco: Never Used   Substance Use Topics    Alcohol use: Yes     Alcohol/week: 1.0 standard drinks     Types: 1 Glasses of wine per week     Comment: weekly     Allergies   Allergen Reactions    Codeine Other (comments)      pt states that he became high with morphine--yrs ago   pt states that he became high with morphine--yrs ago      Simvastatin Myalgia and Other (comments)     And weakness  And weakness       Prior to Admission medications    Medication Sig Start Date End Date Taking? Authorizing Provider   levoFLOXacin (LEVAQUIN) 500 mg tablet Take 500 mg by mouth daily. Yes Provider, Historical   traMADoL (ULTRAM) 50 mg tablet Take 50 mg by mouth. Every 4-6 hours as needed for pain   Yes Provider, Historical   zolpidem (AMBIEN) 5 mg tablet Take 5 mg by mouth nightly. Yes Provider, Historical   finasteride (PROSCAR) 5 mg tablet Take 5 mg by mouth. 19  Yes Provider, Historical   metoprolol tartrate (LOPRESSOR) 25 mg tablet Take 25 mg by mouth daily. Yes Provider, Historical   aspirin 81 mg chewable tablet Take 81 mg by mouth daily. Yes Provider, Historical       Review of Systems AO x4. NAD. Objective:     Patient Vitals for the past 8 hrs:   BP Temp Pulse Resp SpO2   10/12/21 2031 121/83 98.2 °F (36.8 °C) 65 18 96 %   10/12/21 1458 (!) 138/58 97.8 °F (36.6 °C) 64 16 96 %     Temp (24hrs), Av.8 °F (36.6 °C), Min:96.7 °F (35.9 °C), Max:98.4 °F (36.9 °C)      Physical Exam Lower Extremities  Left foot left bandaged. Right foot bandage removed to reveal moderate bleeding on the bandage but no active bleeding. Skin is pink and viable with sutures and incision intact.   Sensation intact to light touch      Data Review:   Recent Results (from the past 24 hour(s))   METABOLIC PANEL, BASIC    Collection Time: 10/12/21  4:25 AM   Result Value Ref Range    Sodium 135 (L) 136 - 145 mmol/L    Potassium 4.6 3.5 - 5.1 mmol/L    Chloride 108 97 - 108 mmol/L    CO2 25 21 - 32 mmol/L    Anion gap 2 (L) 5 - 15 mmol/L    Glucose 83 65 - 100 mg/dL    BUN 48 (H) 6 - 20 MG/DL    Creatinine 1.81 (H) 0.70 - 1.30 MG/DL    BUN/Creatinine ratio 27 (H) 12 - 20      GFR est AA 43 (L) >60 ml/min/1.73m2    GFR est non-AA 36 (L) >60 ml/min/1.73m2    Calcium 8.1 (L) 8.5 - 10.1 MG/DL   CBC W/O DIFF    Collection Time: 10/12/21  4:25 AM   Result Value Ref Range    WBC 9.7 4.1 - 11.1 K/uL    RBC 2.72 (L) 4.10 - 5.70 M/uL    HGB 8.0 (L) 12.1 - 17.0 g/dL    HCT 25.8 (L) 36.6 - 50.3 %    MCV 94.9 80.0 - 99.0 FL    MCH 29.4 26.0 - 34.0 PG    MCHC 31.0 30.0 - 36.5 g/dL    RDW 15.7 (H) 11.5 - 14.5 %    PLATELET 994 002 - 340 K/uL    MPV 10.3 8.9 - 12.9 FL    NRBC 0.0 0  WBC ABSOLUTE NRBC 0.00 0.00 - 0.01 K/uL         Impression:   POD #2, doing well    Recommendation:   Plan is for SNF after discharge. He will need 3-5 days of NWB right foot after Sx, and then a CAM boot to protect the surgical site. I will perform the dressing changes for now right foot, but in general change as needed for bleeding or soiling.

## 2021-10-13 NOTE — PROGRESS NOTES
Bedside and Verbal shift change report given to Nikolas KNOWLES RN (oncoming nurse) by Henry Lozano (offgoing nurse). Report included the following information SBAR, Kardex, Intake/Output, MAR and Recent Results.

## 2021-10-13 NOTE — PROGRESS NOTES
Spiritual Care Assessment/Progress Note  [facility]      NAME: Tammie Tatum      MRN: [id number]  AGE: 80 y.o. SEX: male  Orthodox Affiliation: Yazidism   Language: English     10/13/2021     Total Time (in minutes): 11     Spiritual Assessment begun in [facility] 3 SURG TELE through conversation with:         []Patient        [] Family    [] Friend(s)        Reason for Consult: Initial/Spiritual assessment, patient floor     Spiritual beliefs: (Please include comment if needed)     [x] Identifies with a hernando tradition:         [x] Supported by a hernando community:    [address] [place] [address]         [] Claims no spiritual orientation:           [] Seeking spiritual identity:                [] Adheres to an individual form of spirituality:           [] Not able to assess:                           Identified resources for coping:      [x] Prayer                               [] Music                  [] Guided Imagery     [x] Family/friends                 [] Pet visits     [] Devotional reading                         [] Unknown     [] Other:                                          Interventions offered during this visit: (See comments for more details)    Patient Interventions: Affirmation of emotions/emotional suffering, Affirmation of hernando, Iconic (affirming the presence of God/Higher Power), Initial/Spiritual assessment, patient floor, Prayer (assurance of), Normalization of emotional/spiritual concerns, Orthodox beliefs/image of God discussed     Family/Friend(s):  Affirmation of hernando, Affirmation of emotions/emotional suffering, Catharsis/review of pertinent events in supportive environment, Iconic (affirming the presence of God/Higher Power), Prayer (assurance of), Normalization of emotional/spiritual concerns     Plan of Care:     [] Support spiritual and/or cultural needs    [] Support AMD and/or advance care planning process      [] Support grieving process   [] Coordinate Rites and/or Rituals    [] Coordination with community clergy   [x] No spiritual needs identified at this time   [] Detailed Plan of Care below (See Comments)  [] Make referral to Music Therapy  [] Make referral to Pet Therapy     [] Make referral to Addiction services  [] Make referral to [facility]  [] Make referral to Spiritual Care Partner  [] No future visits requested        [x] Follow up upon further referrals     Comments:  Reviewed chart prior to visit for spiritual assessment. Patient's wife was visiting. Wife expressed hope that rehab facility near home can accept her  for therapy. Mr. Rosalia Omer shared that he is doing okay today. They are members of [facility] in [address] [place] [address]. No spiritual needs or concerns were expressed at this time.   Visit ended when  had to respond to urgent need elsewhere in hospital.     RUY Sykes, [address], Staff Chaplain SALOMON CELAYA [facility] Paging Service  [phone])

## 2021-10-13 NOTE — PROGRESS NOTES
End of Shift Note    Bedside shift change report given to LÓPEZ Romano (oncoming nurse) by Bassam Castrejon RN (offgoing nurse). Report included the following information SBAR, Kardex, Procedure Summary, Intake/Output, MAR and Recent Results    Shift worked:  [id number]     Shift summary and any significant changes:     Pt having pain after dressing changed by Dr. Reji Ennis. He is complaining of intermittent sharp pain that wakes him up and is then gone in a few seconds. PRN Dilaudid given Q4 as ordered. Pt states he can't take the roxicodone as \"it makes me loopy. \"      Concerns for physician to address:  pain management     Zone phone for oncoming shift:          Activity:  Activity Level: Up with Assistance, Recliner  Number times ambulated in hallways past shift: 0  Number of times OOB to chair past shift: 0    Cardiac:   Cardiac Monitoring: No      Cardiac Rhythm: Ventricular Paced    Access:   Current line(s): PIV     Genitourinary:   Urinary status: voiding    Respiratory:   O2 Device: None (Room air)  Chronic home O2 use?: NO  Incentive spirometer at bedside: YES     GI:  Last Bowel Movement Date: 10/10/21  Current diet:  ADULT DIET Regular; Low Fat/Low Chol/High Fiber/ANGELICA  Passing flatus: YES  Tolerating current diet: YES       Pain Management:   Patient states pain is manageable on current regimen: NO    Skin:  Flaco Score: 18  Interventions: float heels and increase time out of bed    Patient Safety:  Fall Score:  Total Score: 3  Interventions: bed/chair alarm, assistive device (walker, cane, etc) and gripper socks  High Fall Risk: Yes    Length of Stay:  Expected LOS: 3d 9h  Actual LOS: [address], RN

## 2021-10-14 NOTE — PROGRESS NOTES
Podiatry    Subjective: Patient is now POD #4 right TMA. He relates a reduction of pain, now just mild unless there is pressure on the foot. 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    Pacemaker     Raynaud's disease     SSS (sick sinus syndrome) ([facility] [address]     pacemaker    Valvular heart disease      Past Surgical History:   Procedure Laterality Date    HX AORTIC VALVE REPLACEMENT      HX PACEMAKER      VASCULAR SURGERY PROCEDURE UNLIST  03/2021    to legs       Family History   Problem Relation Age of Onset    Stroke Mother     Stroke Father     Cancer Sister     Heart Disease Brother         MI      Social History     Tobacco Use    Smoking status: Current Every Day Smoker     Packs/day: 0.25     Years: 30.00     Pack years: 7.50     Types: Cigarettes    Smokeless tobacco: Never Used   Substance Use Topics    Alcohol use: Yes     Alcohol/week: 1.0 standard drinks     Types: 1 Glasses of wine per week     Comment: weekly     Allergies   Allergen Reactions    Codeine Other (comments)      pt states that he became high with morphine--yrs ago   pt states that he became high with morphine--yrs ago      Simvastatin Myalgia and Other (comments)     And weakness  And weakness       Prior to Admission medications    Medication Sig Start Date End Date Taking? Authorizing Provider   levoFLOXacin (LEVAQUIN) 500 mg tablet Take 500 mg by mouth daily. Yes Provider, Historical   traMADoL (ULTRAM) 50 mg tablet Take 50 mg by mouth. Every 4-6 hours as needed for pain   Yes Provider, Historical   zolpidem (AMBIEN) 5 mg tablet Take 5 mg by mouth nightly. Yes Provider, Historical   finasteride (PROSCAR) 5 mg tablet Take 5 mg by mouth. 19  Yes Provider, Historical   metoprolol tartrate (LOPRESSOR) 25 mg tablet Take 25 mg by mouth daily. Yes Provider, Historical   aspirin 81 mg chewable tablet Take 81 mg by mouth daily. Yes Provider, Historical       Review of Systems AO x4. NAD. Objective:     Patient Vitals for the past 8 hrs:   BP Temp Pulse Resp SpO2 Height   10/14/21 1509      5' 11\" (1.803 m)   10/14/21 1200 129/66 98 °F (36.7 °C) 91 18 95 %    10/14/21 0801 139/72 97.9 °F (36.6 °C) 89 18 96 %      Temp (24hrs), Av.8 °F (36.6 °C), Min:97.6 °F (36.4 °C), Max:98 °F (36.7 °C)      Physical Exam Lower Extremities  Left foot left bandaged. Right foot bandage removed to reveal light bleeding on the bandage and light bleeding from where the bandage stuck to the wound. Skin is pink and viable with sutures and incision intact. Sensation intact to light touch      Data Review:   No results found for this or any previous visit (from the past 24 hour(s)). Impression:   POD #4, doing well    Recommendation:   Plan is for SNF after discharge. He will need 3-5 days of NWB right foot after Sx. I tried to apply a small CAM boot; even though the length is correct it was too narrow for the foot despite a smaller bandage. I will bring a medium boot tomorrow.

## 2021-10-14 NOTE — PROGRESS NOTES
Problem: Mobility Impaired (Adult and Pediatric)  Goal: *Acute Goals and Plan of Care (Insert Text)  Description: FUNCTIONAL STATUS PRIOR TO ADMISSION: Patient was modified independent using a single point cane for functional mobility. Wife reports that he was very unsteady but refused to use the RW at home. HOME SUPPORT PRIOR TO ADMISSION: The patient lived with wife but did not require assist.    Physical Therapy Goals  Initiated 10/7/2021  1. Patient will move from supine to sit and sit to supine  in bed with modified independence within 7 day(s). 2.  Patient will transfer from bed to chair and chair to bed with contact guard assist using the least restrictive device within 7 day(s). 3.  Patient will perform sit to stand with contact guard assist within 7 day(s). 4.  Patient will ambulate with minimal assistance for 20 feet with the least restrictive device within 7 day(s). Physical Therapy Goals  Revised 10/14/2021  1. Patient will move from supine to sit and sit to supine , scoot up and down, and roll side to side in bed with modified independence within 7 day(s). 2.  Patient will transfer from bed to chair and chair to bed with contact guard assist using the least restrictive device within 7 day(s). 3.  Patient will perform sit to stand with contact guard assist within 7 day(s). 4.  Patient will ambulate with minimal assistance for 10 feet with the least restrictive device within 7 day(s). Outcome: Progressing Towards Goal     PHYSICAL THERAPY TREATMENT: WEEKLY REASSESSMENT  Patient: Parvez Sin (48 y.o. male)  Date: 10/14/2021  Primary Diagnosis: Cellulitis of left foot [L03.116]  Procedure(s) (LRB):  AMPUTATION TRANSMETATARSAL RIGHT FOOT (Right) 4 Days Post-Op   Precautions:   Fall (NWB R LE, WBAT L LE) (Podiatry to fit CAM boot to RLE and re-address WB when he is able to reduce bandaging per his note)      ASSESSMENT  Nurse clears pt for mobility.   [place] [facility] [place] this date. .. he does not feel he can focus on gait being NWB RLE at this time until Podiatry service is able to fit him with CAM boot and allow for WB. Pt able to use BUE's on RW to compensate well with NWB status and assist with transfers. Patient continues with skilled PT services and is slowly progressing towards goals with goals revised this date to reflect current status. Pt has plans to attend SNF at discharge, likely [facility]. Per chart. Patient's progression toward goals since last assessment: Limited progression due to s/p TMA R foot (now NWB) and LLE toe/skin issues (wrapped in ace bandage)    Current Level of Function Impacting Discharge (mobility/balance): transfers min. Assist with use of RW    Functional Outcome Measure: The patient scored 55/100 on the Barthel outcome measure. Other factors to consider for discharge: pt requires SNF to assist with return to PLOF due to sx intervention, new NWB status         PLAN :  Goals have been updated based on progression since last assessment. Patient continues to benefit from skilled intervention to address the above impairments. Recommendations and Planned Interventions: bed mobility training, transfer training, gait training, therapeutic exercises, patient and family training/education, and therapeutic activities      Frequency/Duration: Patient will be followed by physical therapy:  5 times a week to address goals. Recommendation for discharge: (in order for the patient to meet his/her long term goals)  Therapy up to 5 days/week in SNF setting    This discharge recommendation:  Has been made in collaboration with the attending provider and/or case management    IF patient discharges home will need the following DME: to be determined (TBD)         SUBJECTIVE:   Patient stated I have to wait for him (Podiatry) to fit me with this boot once the bandaging is less.     OBJECTIVE DATA SUMMARY:   HISTORY:    Past Medical History:   Diagnosis Date    CAD (coronary artery disease), native coronary artery      RCA    Creatinine elevation 2020    H/O aortic valve stenosis     post valve replacement    Hypertension     Liver disease     Hepatits A in 1980s    Pacemaker     Raynaud's disease     SSS (sick sinus syndrome) ([facility] [address]     pacemaker    Valvular heart disease      Past Surgical History:   Procedure Laterality Date    HX AORTIC VALVE REPLACEMENT      HX PACEMAKER      VASCULAR SURGERY PROCEDURE UNLIST  03/2021    to legs       Personal factors and/or comorbidities impacting plan of care:     Home Situation  Home Environment: Private residence  # Steps to Enter: 4  Rails to Enter: Yes  Hand Rails : Bilateral  One/Two Story Residence: One story  Living Alone: No  Support Systems: Spouse/Significant Other, Child(prasanna)  Patient Expects to be Discharged to[de-identified] Rehabilitation facility  Current DME Used/Available at Home: Candance March, giovanny, Walker, rolling    EXAMINATION/PRESENTATION/DECISION MAKING:   Critical Behavior:  Neurologic State: Alert, Appropriate for age  Orientation Level: Appropriate for age, Oriented to person, Oriented to place, Oriented to situation        Hearing: Auditory  Auditory Impairment: None  Hearing Aids/Status: Does not own  Skin:  LLE bandaged below knee ; R ankle bandaged  Edema: none observed  Range Of Motion:  AROM: Generally decreased, functional           PROM: Generally decreased, functional           Strength:    Strength: Generally decreased, functional                    Tone & Sensation:   Tone: Normal              Sensation: Impaired               Coordination:  Coordination: Within functional limits  Vision:    Glasses  Functional Mobility:  Bed Mobility:      Pt seated on BSC on arrival to room this date.   Pt finished with BM, had completed hygiene care without assist.        Transfers:  Sit to Stand: Minimum assistance  Stand to Sit: Minimum assistance                       Balance:   Sitting: Intact  Standing: Impaired; With support  Standing - Static: Constant support;Fair; Other (comment) (RW)  Standing - Dynamic : Constant support;Fair; Other (comment) (RW)    Functional Measure:  Barthel Index:    Bathin  Bladder: 10  Bowels: 10  Groomin  Dressin  Feeding: 10  Mobility: 0  Stairs: 0  Toilet Use: 5  Transfer (Bed to Chair and Back): 10  Total: 55/100       The Barthel ADL Index: Guidelines  1. The index should be used as a record of what a patient does, not as a record of what a patient could do. 2. The main aim is to establish degree of independence from any help, physical or verbal, however minor and for whatever reason. 3. The need for supervision renders the patient not independent. 4. A patient's performance should be established using the best available evidence. Asking the patient, friends/relatives and nurses are the usual sources, but direct observation and common sense are also important. However direct testing is not needed. 5. Usually the patient's performance over the preceding 24-48 hours is important, but occasionally longer periods will be relevant. 6. Middle categories imply that the patient supplies over 50 per cent of the effort. 7. Use of aids to be independent is allowed. Cristino Doty., Barthel, D.W. (0496). Functional evaluation: the Barthel Index. [facility] (14)2. DORIAN Hoskins, Roxanne Santizo., Eris Mccarthys., [place], [address] [place] [address] (). Measuring the change indisability after inpatient rehabilitation; comparison of the responsiveness of the Barthel Index and Functional [place] Measure. Journal of Neurology, Neurosurgery, and Psychiatry, 66(4), 351-243. Tobias Cuevas, N.J.A, AMPARO Son, & Todd Sharpe, M.A. (2004.) Assessment of post-stroke quality of life in cost-effectiveness studies: The usefulness of the Barthel Index and the EuroQoL-5D.  Quality of Life Research, 13, 427-43          Pain Rating:  No c/o pain    Activity Tolerance: Fair    After treatment patient left in no apparent distress:   Sitting in chair, Heels elevated for pressure relief, Call bell within reach, and nurse notified    COMMUNICATION/EDUCATION:   The patients plan of care was discussed with: Registered nurse. Fall prevention education was provided and the patient/caregiver indicated understanding., Patient/family have participated as able in goal setting and plan of care. , and Patient/family agree to work toward stated goals and plan of care.     Thank you for this referral.  Kaden Limon, PT, DPT   Time Calculation: 12 mins

## 2021-10-14 NOTE — PROGRESS NOTES
[facility]   Room #: 106B  RN to call report: [phone] [facility])  Fax#: [phone]    [facility] ETA: 12/15 at 12:00 pm.    Transition of Care Plan:     RUR: 14% - low risk  Disposition: SNF - [facility]  Will need NERISSA orders for [facility] - if pt goes home.   Follow up appointments: PCP, Specialist  DME needed: No needs identified. Pt has RW, cane at home.   Transportation at Discharge: [facility] 10/15 at 12:00  Keys or means to access home:   yes     IM Medicare Letter: 2nd IM Medicare letter - received and received 10/14.   Is patient a BCPI-A Bundle: No                    If yes, was Bundle Letter given?:  N/A   [address] , spouse, H) [phone], C) [phone]  Discharge Caregiver contacted prior to discharge? yes    Updated Note 12:50 pm: [facility] scheduled for 10/15 at [facility]. CM at bedside to discuss discharge planning with pt and wife. Both verbalize understanding and have no questions for CM. Initial Note 12:00 pm: In review of chart, pt is not medically stable for discharge. Anticipated discharge 10/15 (depending on podiatry's recommendations). COVID test negative. CM contacted [facility] and spoke to [facility]. Information given. Referral sent to [facility] requesting for 12:00 on 10/15. Medicare pt has received, reviewed, and signed 2nd IM letter informing them of their right to appeal the discharge. Signed copied has been placed on pt bedside chart. Unit CM will continue to follow.      Janet Mcknight, RN, BSN, [address] [place] [address] Care Manager  [phone]

## 2021-10-14 NOTE — PROGRESS NOTES
Comprehensive Nutrition Assessment    Type and Reason for Visit: Initial, RD nutrition re-screen/LOS    Nutrition Recommendations/Plan:   · Continue diet as tolerated. · RD ordered ensure plus chocolate and nepro butter pecan, one with breakfast and one with dinner meal.  · Please document % meals and supplements consumed in flowsheet I/O's under intake. Nutrition Assessment:     10/14: Chart reviewed; med noted for cellulitis of the left foot, infected toe with recent toe amputation. Hx of lipids, CAD. RD visited with pt at bedside, reports recent decreased appetite. Family member endorses this and states pt has lost a significant amount of muscle mass. RD completed a nutrition focused physical exam indicating severe muscle and fat loss. Also reports poor hydration, consuming less water and more coffee and pepsi. Encouraged the importance of adequate nutrition and protein for wound healing; agreeable to try ensure plus chocolate and nepro butter pecan to vary flavors. Patient Vitals for the past 168 hrs:   % Diet Eaten   10/14/21 1034 51 - 75%   10/13/21 1500 51 - 75%   10/12/21 1734 26 - 50%   10/12/21 1316 76 - 100%   10/12/21 1117 51 - 75%   10/11/21 1223 76 - 100%   10/11/21 0900 76 - 100%   10/10/21 1820 76 - 100%   10/09/21 1006 76 - 100%     Last Weight Metric  Weight Loss Metrics 10/6/2021 8/24/2021 5/17/2021 3/17/2021 2/27/2020 2/5/2019 1/24/2019   Today's Wt 141 lb 15.6 oz 141 lb 141 lb 15.6 oz 147 lb 12.8 oz 148 lb 152 lb 152 lb 3.2 oz   BMI 19.8 kg/m2 20.23 kg/m2 20.37 kg/m2 21.21 kg/m2 21.24 kg/m2 21.81 kg/m2 21.84 kg/m2       Estimated Daily Nutrient Needs:  Energy (kcal): 1984 (BMR 1334 x 1. 3AF) + 250 kcals; Weight Used for Energy Requirements: Current  Protein (g): 64-76 (1.0 - 1.2 g/kg bw); Weight Used for Protein Requirements: Current  Fluid (ml/day): 2000 ml/day; Method Used for Fluid Requirements: 1 ml/kcal    Nutrition Related Findings:  BM: 10/14; Labs: Creat, Na+ 135; Meds: Lopressor, melatonin      Wounds:    Surgical incision       Current Nutrition Therapies:  ADULT ORAL NUTRITION SUPPLEMENT Dinner; Renal Supplement  ADULT DIET Regular; Low Fat/Low Chol/High Fiber/ANGELICA; Nepro Butter Pecan @ Dinner; Chocolate Ensure Plus at Breakfast  ADULT ORAL NUTRITION SUPPLEMENT Breakfast; Standard High Calorie/High Protein    Anthropometric Measures:  · Height:  5' 11\" (180.3 cm)  · Current Body Wt:  64.4 kg (141 lb 15.6 oz)   · Ideal Body Wt:  172 lbs:  82.5 %    · BMI Category:  Normal weight (BMI 18.5-24. 9)       Nutrition Diagnosis:   · Increased nutrient needs related to  (s/p surgical debridement, toes amputations) as evidenced by  (increased protein to optimize wound healing)    Nutrition Interventions:   Food and/or Nutrient Delivery: Continue current diet, Start oral nutrition supplement  Nutrition Education and Counseling: No recommendations at this time  Coordination of Nutrition Care: Continue to monitor while inpatient    Goals:  Trend PO intake at least 50% of meals + consume 240 ml ONS next 3-5 days       Nutrition Monitoring and Evaluation:   Behavioral-Environmental Outcomes: None identified  Food/Nutrient Intake Outcomes: Food and nutrient intake, Supplement intake  Physical Signs/Symptoms Outcomes: Biochemical data, Skin, Weight    Discharge Planning:    Continue current diet     Electronically signed by Warren Cruz RD on 10/14/2021 at 3:26 PM

## 2021-10-14 NOTE — PROGRESS NOTES
Bedside and Verbal shift change report given to [address] (oncoming nurse) by Jaskaran Koehler (offgoing nurse). Report included the following information SBAR, Kardex, Intake/Output, MAR and Recent Results.

## 2021-10-14 NOTE — PROGRESS NOTES
Podiatry    Subjective: Patient is now POD #3 right TMA. He relates a reduction of pain, now just mild unless there is pressure on the foot. 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    Pacemaker     Raynaud's disease     SSS (sick sinus syndrome) ([facility] [address]     pacemaker    Valvular heart disease      Past Surgical History:   Procedure Laterality Date    HX AORTIC VALVE REPLACEMENT      HX PACEMAKER      VASCULAR SURGERY PROCEDURE UNLIST  03/2021    to legs       Family History   Problem Relation Age of Onset    Stroke Mother     Stroke Father     Cancer Sister     Heart Disease Brother         MI      Social History     Tobacco Use    Smoking status: Current Every Day Smoker     Packs/day: 0.25     Years: 30.00     Pack years: 7.50     Types: Cigarettes    Smokeless tobacco: Never Used   Substance Use Topics    Alcohol use: Yes     Alcohol/week: 1.0 standard drinks     Types: 1 Glasses of wine per week     Comment: weekly     Allergies   Allergen Reactions    Codeine Other (comments)      pt states that he became high with morphine--yrs ago   pt states that he became high with morphine--yrs ago      Simvastatin Myalgia and Other (comments)     And weakness  And weakness       Prior to Admission medications    Medication Sig Start Date End Date Taking? Authorizing Provider   levoFLOXacin (LEVAQUIN) 500 mg tablet Take 500 mg by mouth daily. Yes Provider, Historical   traMADoL (ULTRAM) 50 mg tablet Take 50 mg by mouth. Every 4-6 hours as needed for pain   Yes Provider, Historical   zolpidem (AMBIEN) 5 mg tablet Take 5 mg by mouth nightly. Yes Provider, Historical   finasteride (PROSCAR) 5 mg tablet Take 5 mg by mouth. 19  Yes Provider, Historical   metoprolol tartrate (LOPRESSOR) 25 mg tablet Take 25 mg by mouth daily. Yes Provider, Historical   aspirin 81 mg chewable tablet Take 81 mg by mouth daily. Yes Provider, Historical       Review of Systems AO x4. NAD. Objective:     Patient Vitals for the past 8 hrs:   BP Temp Pulse Resp SpO2   10/13/21 2046 (!) 165/78 97.8 °F (36.6 °C) 65 18 94 %   10/13/21 1548 (!) 152/83 97.7 °F (36.5 °C) 63 18 90 %     Temp (24hrs), Av.8 °F (36.6 °C), Min:97.7 °F (36.5 °C), Max:98 °F (36.7 °C)      Physical Exam Lower Extremities  Right foot left bandaged, bandage is clean, dry and intact. Left foot bandage removed to reveal purulent drainage from the leg wounds and wounds have increased in depth to sub-Q. The wounds of the left toes are stable, unchanged. Sensation intact to light touch      Data Review:   Recent Results (from the past 24 hour(s))   METABOLIC PANEL, BASIC    Collection Time: 10/13/21  1:33 AM   Result Value Ref Range    Sodium 135 (L) 136 - 145 mmol/L    Potassium 4.6 3.5 - 5.1 mmol/L    Chloride 106 97 - 108 mmol/L    CO2 26 21 - 32 mmol/L    Anion gap 3 (L) 5 - 15 mmol/L    Glucose 88 65 - 100 mg/dL    BUN 55 (H) 6 - 20 MG/DL    Creatinine 1.95 (H) 0.70 - 1.30 MG/DL    BUN/Creatinine ratio 28 (H) 12 - 20      GFR est AA 40 (L) >60 ml/min/1.73m2    GFR est non-AA 33 (L) >60 ml/min/1.73m2    Calcium 8.4 (L) 8.5 - 10.1 MG/DL   SARS-COV-2    Collection Time: 10/13/21 12:32 PM   Result Value Ref Range    SARS-CoV-2 Please find results under separate order           Impression:   POD #3, doing well  Arterial ulcers left foot and leg    Recommendation:   Plan is for SNF after discharge.  He will need 3-5 days of NWB right foot after Sx, and then a CAM boot to protect the surgical site. I brought a CAM boot to check size. The length of the foot requires a small boot, but the bandage is still too bulky to fit in the CAM boot. Next bandage change I will be able to reduce the amount of gauze and the boot will probably fit then. Still NWB right foot for now.

## 2021-10-14 NOTE — PROGRESS NOTES
Vascular Surgery Progress Note  Peggy Maddox ACNP-BC  10/14/2021       Subjective:     Lynette Salcedo a frail YAHJYYJ 36 y.o.  male with a pmhx significant for ASHD, HTN, SSS s/p PPM placement, CKD, BPH, and Raynaud's disease. [facility] continues to smoke. Xavier Ferreira has known PAD and is s/p right popliteal atherectomy/BAP/stenting and posterior tibial atherectomy/BAP 9/16/2021 for gangrene of the right second toe.  Since the procedure he underwent amputation of the toe. [facility] returned to the clinic 10/5/2021 with dehiscence of his surgical wound. [facility] had purulent drainage of his surgical incision. He had a right posterior heel ulceration.  He presented with the left leg in an unna boot which was removed. He was found to have multiple ulcerations of the left shin with purulent drainage.  His ABIs following his A-gram on 9/16 were right 1.09 which was improved; however, his TBI was 0.0 and left 0.56 which had decreased from 0.85 following unna boot placement. He is s/p right foot TMA on 10/10/2021 w/ Dr. Nick Estrada. Wound is stable per Dr. Nick Estrada. He continues to change dressings daily. No acute events overnight.       Nursing Data:     Patient Vitals for the past 24 hrs:   BP Temp Pulse Resp SpO2   10/14/21 0801 139/72 97.9 °F (36.6 °C) 89 18 96 %   10/14/21 0347 138/64 97.6 °F (36.4 °C) 65 18 96 %   10/14/21 0030 (!) 156/95 97.8 °F (36.6 °C) 65 17 95 %   10/13/21 2046 (!) 165/78 97.8 °F (36.6 °C) 65 18 94 %   10/13/21 1548 (!) 152/83 97.7 °F (36.5 °C) 63 18 90 %   10/13/21 1118 111/70 97.8 °F (36.6 °C) 65 16 97 %         Intake/Output Summary (Last 24 hours) at 10/14/2021 1015  Last data filed at 10/14/2021 0556  Gross per 24 hour   Intake 1550 ml   Output 1100 ml   Net 450 ml       Exam:     Physical Exam  Constitutional:       Comments: Pale, frail, elderly thin  male in no acute distress   HENT:      Head: Normocephalic.      Nose: Nose normal.      Mouth/Throat:      Mouth: Mucous membranes are moist. Cardiovascular:      Rate and Rhythm: Normal rate and regular rhythm.      Pulses:           Femoral pulses are 2+ on the right side and 2+ on the left side.       Popliteal pulses are 1+ on the right side and 0 on the left side.        Dorsalis pedis pulses are 0 on the right side and 0 on the left side.        Posterior tibial pulses are 0 on the right side and 0 on the left side. Pulmonary:      Effort: Pulmonary effort is normal. No respiratory distress. Abdominal:      General: Abdomen is flat. There is no distension. Musculoskeletal:         General: Normal range of motion.      Cervical back: Normal range of motion.      Right lower leg: Edema  significantly improved.      Left lower leg: Edema  significantly improved. Skin:     Coloration: Skin is pale.      Comments: Right foot: TMA w/ dry dressing. Left leg: Multiple ulcerations of the left shin with decreased drainage. Neurological:      Mental Status: Mental status is at baseline. Psychiatric:         Mood and Affect: Mood normal.         Behavior: He is easily agitated. Lab Review:     . Recent Results (from the past 24 hour(s))   SARS-COV-2    Collection Time: 10/13/21 12:32 PM   Result Value Ref Range    SARS-CoV-2 Please find results under separate order     SARS-COV-2    Collection Time: 10/13/21 12:32 PM   Result Value Ref Range    Specimen source Nasopharyngeal      SARS-CoV-2 Not detected NOTD            Assessment/Plan:     Peripheral artery arterial disease with ulceration of the bilateral lower extremities  ABIs 10/5: Right 1.09 and left 0.56.  TBI's flatline bilateral.  Following recent RLE intervention. -RLE duplex reveals normal perfusion throughout the RLE to the hindfoot. · Patient's right foot wound is stable. No role for RLE endovascular intervention. High risk for progression to more proximal amputation to due distal microvascular disease. Left leg wounds are stable.   Plan for outpatient LLE A-gram. · Continue ASA. Intolerant of statins.       Cellulitis of the bilateral lower extremities secondary to ulcerations.  -Status post right foot TMA 10/10  -Wound culture is polymicrobial: Citrobacter koseri, alcaligenes facecalis, and Enterococcus Faecalis  -Surgical wound culture: Enterococcus Faecalis  · Continue Cefepime and Ampicillin. Risaquad while on antibxs. · Wound care of the right foot per Dr. Jcarlos Hernandez  · Wound care of the left leg per recommendations of the wound care team.  · Continue current pain regimen     Chronic renal disease stage III  -Stable and near baseline   Hyponatremia  -Stable  Anemia of chronic disease  -Stable    Coronary artery disease  -Continue BBlocker and ASA  Hypertension  -Stable   Hyperlipidemia  -Statin intolerance  SSS  -S/p PPM placement  -Rate currently controlled  Aortic valve stenosis  -S/p aortic valve replacement (not on OAG prior to presenting)     Tobacco use   -Smoking sensation EDU completed  -Nicotine patch ordered     General debility  -Air mattress  -PT following   · NWB right foot. Clam boot in the room. Dr. Jcarlos Hernandez to place when appropriate. In the interim continue to turn every 2 hours. Float heels at all times. OOB to chair daily. Encourage IS.       VTE prophylaxis:  [facility]  SCDs contraindicated in diffuse PAD     Disposition:   SNF placement    SARS-COV-2 pending

## 2021-10-15 NOTE — PROGRESS NOTES
[facility]   Room #: 106B  RN to call report: [phone] [facility])  Fax#: [phone]     [facility] ETA: 12/15 at 2:00 pm.     Transition of Care Plan:     RUR: 14% - low risk  Disposition: SNF - [facility]   Follow up appointments: PCP, Specialist  DME needed: No needs identified. Pt has RW, cane at home.   Transportation at Discharge: [facility] 10/15 at 2:00pm.  Keys or means to access home:   yes     IM Medicare Letter: 2nd IM Medicare letter - received and received 10/14.   Is patient a BCPI-A Bundle: No                    If yes, was Bundle Letter given?:  N/A   [address] , spouse, H) [phone], C) [phone]  Discharge Caregiver contacted prior to discharge? yes    Initial Note 12:00 pm: CM at bedside with pt and wife to discuss discharge planning. Both verbalize understanding and have no questions. Pt has CAM boot at bedside. Updated on new ETA with [facility] CM contacted [address] [place] [address] and updated. Transition of Care Plan to SNF/Rehab    SNF/Rehab Transition:  Patient has been accepted to [address] [place] [address] and meets criteria for admission. Patient will transported by [facility] and expected to leave at 2:00 pm.    Communication to Patient/Family:  Met with patient and wife and they are agreeable to the transition plan. Communication to SNF/Rehab:  Bedside RN, Sachin Mcdowell , has been notified to update the transition plan to the facility and call report (phone [phone]  ). Discharge information has been updated on the AVS.     Discharge instructions to be fax'd to facility at [facility] # [phone]). Nursing Please include all hard scripts for controlled substances, med rec and dc summary, and AVS in packet.      Reviewed and confirmed with facility, [address] [place] [address] - admissions), can manage the patient care needs for the following:     Donald Figueredo with (X) only those applicable:    Medication:  [x]  Medications will be available at the facility  []  IV Antibiotics   [x]  Controlled Substance - hard copy to be sent with patient   []  Weekly Labs   Documents:  [x] Hard RX  [x] MAR  [x] Kardex  [x] AVS  [x]Transfer Summary  [x]Discharge   Equipment:  []  CPAP/BiPAP  []  Wound Vacuum  []  Rayo or Urinary Device  []  PICC/Central Line  []  Nebulizer  []  Ventilator   Treatment:  []Isolation (for MRSA, VRE, etc.)  []Surgical Drain Management  []Tracheostomy Care  [x]Dressing Changes  []Dialysis with transportation and chair time . []PEG Care  []Oxygen  []Daily Weights for Heart Failure   Dietary:  []Any diet limitations  []Tube Feedings   []Total Parenteral Management (TPN)   Eligible for Medicaid Long Term Services and Supports  Yes:  [] Eligible for medical assistance or will become eligible within 180 days and UAI completed. [] Provider/Patient and/or support system has requested screening. [] UAI copy provided to patient or responsible party, .  [] UAI unavailable at discharge will send once processed to SNF provider. [] UAI unavailable at discharged mailed to patient  No:   [] Private pay and is not financially eligible for Medicaid within the next 180 days. [] Reside out-of-state. [] A residents of a state owned/operated facility that is licensed  by [facility] or [facility]  [] Enrollment in [facility] hospice services  [] [address]  [] Patient /Family declines to have screening completed or provide financial information for screening     Financial Resources:  Medicaid    [] Initiated and application pending   [] Full coverage     Advanced Care Plan:  []Surrogate Decision Maker of Care  []POA  []Communicated Code Status Full    Other    Follow up appts on AVS     Care Management Interventions  PCP Verified by CM:  Yes  Mode of Transport at Discharge: [address]  [address] Time of Discharge: 1400  Transition of Care Consult (CM Consult): SNF, Discharge Planning  Discharge Durable Medical Equipment: No  Physical Therapy Consult: Yes  Occupational Therapy Consult: Yes  Speech Therapy Consult: No  Support Systems: Spouse/Significant Other, Child(prasanna), Other Family Member(s), Skilled Nursing Facility  Confirm Follow Up Transport: Family  The Plan for Transition of Care is Related to the Following Treatment Goals : [facility] - [facility]  The Patient and/or Patient Representative was Provided with a Choice of Provider and Agrees with the Discharge Plan?: Yes  Name of the Patient Representative Who was Provided with a Choice of Provider and Agrees with the Discharge Plan: patient and wife   Freedom of Choice List was Provided with Basic Dialogue that Supports the Patient's Individualized Plan of Care/Goals, Treatment Preferences and Shares the Quality Data Associated with the Providers?: Yes  Discharge Location  Discharge Placement: Skilled nursing facility    Unit CM will continue to follow.      Matthieu Valenzuela RN, BSN, [address] [place] [address] Care Manager  [phone]

## 2021-10-15 NOTE — PROGRESS NOTES
End of Shift Note    Bedside shift change report given to [facility]. RN (oncoming nurse) by Aline Summers RN (offgoing nurse). Report included the following information SBAR, Kardex, OR Summary, Procedure Summary, Intake/Output, MAR, Accordion and Recent Results    Shift worked:  4p-7p     Shift summary and any significant changes:    Pt stable, up to chair, no c/o pain, bilat foot drsgs CDI. Uneventful shift. Concerns for physician to address:  none     Zone phone for oncoming shift:   [phone]       Activity:  Activity Level: Up with Assistance  Number times ambulated in hallways past shift: 0  Number of times OOB to chair past shift: 1    Cardiac:   Cardiac Monitoring: No      Cardiac Rhythm: Ventricular Paced    Access:   Current line(s): PIV     Genitourinary:   Urinary status: voiding    Respiratory:   O2 Device: None (Room air)  Chronic home O2 use?: NO  Incentive spirometer at bedside: NO     GI:  Last Bowel Movement Date: 10/14/21  Current diet:  ADULT ORAL NUTRITION SUPPLEMENT Dinner; Renal Supplement  ADULT DIET Regular; Low Fat/Low Chol/High Fiber/ANGELICA; Nepro Butter Pecan @ Dinner; Chocolate Ensure Plus at Breakfast  ADULT ORAL NUTRITION SUPPLEMENT Breakfast; Standard High Calorie/High Protein  Passing flatus: YES  Tolerating current diet: YES       Pain Management:   Patient states pain is manageable on current regimen: YES    Skin:  Flaco Score: 17  Interventions: increase time out of bed    Patient Safety:  Fall Score:  Total Score: 3  Interventions: gripper socks and pt to call before getting OOB  High Fall Risk: Yes    Length of Stay:  Expected LOS: 3d 9h  Actual LOS: [address], RN

## 2021-10-15 NOTE — PROGRESS NOTES
Podiatry    Subjective: Patient is now POD #5 right TMA. He relates a reduction of pain, now just mild unless there is pressure on the foot. 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    Pacemaker     Raynaud's disease     SSS (sick sinus syndrome) ([facility] [address]     pacemaker    Valvular heart disease      Past Surgical History:   Procedure Laterality Date    HX AORTIC VALVE REPLACEMENT      HX PACEMAKER      VASCULAR SURGERY PROCEDURE UNLIST  03/2021    to legs       Family History   Problem Relation Age of Onset    Stroke Mother     Stroke Father     Cancer Sister     Heart Disease Brother         MI      Social History     Tobacco Use    Smoking status: Current Every Day Smoker     Packs/day: 0.25     Years: 30.00     Pack years: 7.50     Types: Cigarettes    Smokeless tobacco: Never Used   Substance Use Topics    Alcohol use: Yes     Alcohol/week: 1.0 standard drinks     Types: 1 Glasses of wine per week     Comment: weekly     Allergies   Allergen Reactions    Codeine Other (comments)      pt states that he became high with morphine--yrs ago   pt states that he became high with morphine--yrs ago      Simvastatin Myalgia and Other (comments)     And weakness  And weakness       Prior to Admission medications    Medication Sig Start Date End Date Taking? Authorizing Provider   aspirin delayed-release 81 mg tablet Take 1 Tablet by mouth daily.  10/16/21  Yes Татьяна Rain NP   HYDROmorphone (DILAUDID) 2 mg tablet Take 0.5 Tablets by mouth every four (4) hours as needed for Pain for up to 5 days. Max Daily Amount: 6 mg. 10/15/21 10/20/21 Yes Lino Reed NP   L.acid,para-B. bifidum-S.therm (RISAQUAD) 8 billion cell cap cap Take 1 Capsule by mouth daily. While taking antibxs 10/16/21  Yes Татьяна Rain NP   melatonin 3 mg tablet Take 2 Tablets by mouth nightly. 10/15/21  Yes аТтьяна Rain, NP   amoxicillin-clavulanate (Augmentin) 500-125 mg per tablet Take 1 Tablet by mouth two (2) times a day. 10/15/21  Yes Татьяна Rain NP   levoFLOXacin (LEVAQUIN) 500 mg tablet Take 500 mg by mouth daily. Yes Provider, Historical   traMADoL (ULTRAM) 50 mg tablet Take 50 mg by mouth. Every 4-6 hours as needed for pain   Yes Provider, Historical   zolpidem (AMBIEN) 5 mg tablet Take 5 mg by mouth nightly. Yes Provider, Historical   finasteride (PROSCAR) 5 mg tablet Take 5 mg by mouth. 19  Yes Provider, Historical   metoprolol tartrate (LOPRESSOR) 25 mg tablet Take 25 mg by mouth daily. Yes Provider, Historical   aspirin 81 mg chewable tablet Take 81 mg by mouth daily. Yes Provider, Historical       Review of Systems AO x4. NAD. Objective:     Patient Vitals for the past 8 hrs:   BP Temp Pulse Resp SpO2   10/15/21 0750 (!) 187/88 98 °F (36.7 °C) 65 18 95 %   10/15/21 0412 129/63 98.1 °F (36.7 °C) 63 18 94 %     Temp (24hrs), Av °F (36.7 °C), Min:97.7 °F (36.5 °C), Max:98.3 °F (36.8 °C)      Physical Exam Lower Extremities  Left foot left bandaged. Right foot bandaged. Sensation intact to light touch      Data Review:   No results found for this or any previous visit (from the past 24 hour(s)). Impression:   POD #5, doing well    Recommendation:   I attempted both a medium and a large CAM boot today, but neither fit properly. He will have to use just a PO shoe right foot, and he does need to start ambulation with assistance right foot.  He is expected to be discharged today, and should F/U in my office in 1-2 weeks. Bandage changes right foot 3x/week, orders entered.

## 2021-10-15 NOTE — DISCHARGE SUMMARY
Vascular Surgery Discharge Summary     Patient ID:  Parvez Sin  [id number]  86 y.o.  1/6/1933    Admitting Provider: Van Angel MD  Discharging Provider: Van Angel MD    Admit Date: 10/6/2021    Discharge Date: 10/15/2021    Discharge Diagnoses:    Peripheral artery arterial disease with ulceration of the bilateral lower extremities POA  ABIs 10/5: Right 1.09 and left 0.56.  TBI's flatline bilateral.  Following recent RLE intervention. -RLE duplex reveals normal perfusion throughout the RLE to the hindfoot\  -Patient's right foot wound is stable. No role for RLE endovascular intervention. High risk for progression to more proximal amputation to due distal microvascular disease. Left leg wounds are stable. Plan is for outpatient LLE A-gram.   -Continue ASA. Intolerant of statins. Cellulitis of the bilateral lower extremities secondary to ulcerations POA   -Status post right foot TMA 10/10  -Wound culture is polymicrobial: Citrobacter koseri, alcaligenes facecalis, and Enterococcus Faecalis  -Surgical wound culture: Enterococcus Faecalis  -IV Cefepime and Ampicillin administered during admission. Converted to oral Augmentin at discharge.   -Wound care of the right foot per Dr. José Scott.    -Wound care of the left leg per recommendations of the wound care team.  See below. Chronic renal disease stage III POA  -Stable and near baseline   Hyponatremia POA  -Stable  Anemia of chronic disease POA  -Stable  Coronary artery disease POA  -Continue BBlocker and ASA  Hypertension POA  -Stable on BBlocker   Hyperlipidemia POA  -Statin intolerance  SSS POA  -S/p PPM placement  -Rate currently controlled  Aortic valve stenosis POA  -S/p aortic valve replacement (not on OAG prior to presenting)  Tobacco use POA  -Smoking sensation EDU completed  -Nicotine patch ordered  General debility POA  .     Procedures during admission:  Procedure(s):  AMPUTATION TRANSMETATARSAL RIGHT FOOT 10/10/2021    Hospital Course:   Darron Prior a frail ALEXS 26 y.o.  male with a pmhx significant for ASHD, HTN, SSS s/p PPM placement, CKD, BPH, and Raynaud's disease. [facility] continues to smoke. Chris Ortiz has known PAD and is s/p right popliteal atherectomy/BAP/stenting and posterior tibial atherectomy/BAP 9/16/2021 for gangrene of the right second toe. Following the procedure he underwent amputation of the toe by Dr. Argelia Brown returned to the clinic 10/5/2021 with dehiscence of his surgical wound. [facility] had purulent drainage of his surgical incision. He had a right posterior heel ulceration.  He presented with the left leg in an unna boot which was removed. [facility] was found to have multiple ulcerations of the left shin with purulent drainage.  His ABIs following his A-gram on 9/16/2021 were right 1.09 which was improved; however, his TBI was 0.0 and left 0.56 which had decreased from 0.85 following unna boot placement. He has no further options for endovascular revascularization of the right leg. He was referred for admission for IV antibiotics and surgical debridement. He underwent a right foot TMA on 10/10/2021 w/ Dr. Tomasita Hodgkins.  The wound care team was consulted for management of his left lower extremity wounds. Plan is for left leg arteriogram as an outpatient. On day of discharge he made remained weak and debilitated. Per Dr. Colton Marcelino his right foot wound was stable. His left leg wounds are stable as well. Pertinent Results:   No results found for this or any previous visit (from the past 24 hour(s)).     Vital signs: Patient Vitals for the past 24 hrs:   BP Temp Pulse Resp SpO2 Height   10/15/21 0750 (!) 187/88 98 °F (36.7 °C) 65 18 95 %    10/15/21 0412 129/63 98.1 °F (36.7 °C) 63 18 94 %    10/15/21 0054 136/69 98.3 °F (36.8 °C) 66 18     10/14/21 2013 116/69 98.1 °F (36.7 °C) 65 18 95 %    10/14/21 1631 124/66 97.7 °F (36.5 °C) 65 18 90 %    10/14/21 1509      5' 11\" (1.803 m) 10/14/21 1200 129/66 98 °F (36.7 °C) 91 18 95 %        Patient Weight:   Last 3 Recorded Weights in this Encounter    10/06/21 0943   Weight: 64.4 kg (141 lb 15.6 oz)       Consults: Podiatry & Wound Care Team     Patient Condition at Discharge: stable    Disposition: SNF    Patient Instructions:   Current Discharge Medication List      START taking these medications    Details   aspirin delayed-release 81 mg tablet Take 1 Tablet by mouth daily. Qty: 90 Tablet, Refills: 3      HYDROmorphone (DILAUDID) 2 mg tablet Take 0.5 Tablets by mouth every four (4) hours as needed for Pain for up to 5 days. Max Daily Amount: 6 mg. Qty: 10 Tablet, Refills: 0    Associated Diagnoses: Status post amputation of toe of right foot ([facility]      L.acid,para-B. bifidum-S.therm (RISAQUAD) 8 billion cell cap cap Take 1 Capsule by mouth daily. While taking antibxs  Qty: 7 Capsule, Refills: 0      melatonin 3 mg tablet Take 2 Tablets by mouth nightly. Qty: 180 Tablet, Refills: 3      amoxicillin-clavulanate (Augmentin) 500-125 mg per tablet Take 1 Tablet by mouth two (2) times a day. Qty: 14 Tablet, Refills: 0         CONTINUE these medications which have NOT CHANGED    Details   finasteride (PROSCAR) 5 mg tablet Take 5 mg by mouth.      metoprolol tartrate (LOPRESSOR) 25 mg tablet Take 25 mg by mouth daily. Associated Diagnoses: Coronary artery disease involving native coronary artery of native heart without angina pectoris; SSS (sick sinus syndrome) ([facility]  Bradycardia; H/O aortic valve stenosis; H/O aortic valve replacement         STOP taking these medications       levoFLOXacin (LEVAQUIN) 500 mg tablet Comments:   Reason for Stopping:         traMADoL (ULTRAM) 50 mg tablet Comments:   Reason for Stopping:         zolpidem (AMBIEN) 5 mg tablet Comments:   Reason for Stopping:         aspirin 81 mg chewable tablet Comments:   Reason for Stopping:               Diet: Cardiac Diet    Discharge Instructions After Surgery   Home care  1. Float heels off of pillow when in bed. 2. Right foot: Remove old bandage. Clean with sterile saline. Apply adaptic to the wound. Cover with 4x4\" gauze. Wrap with Kerlix. Secure with tape. Wear post op shoe at all times when out of bed. Weight bear as tolerated right foot. 3. Left foot wounds (left hallux, left 2nd toe, left 5th toe): Once daily clean with dermal skin . Apply Silvasorb gel, cover with gauze. Wrap gently with kerlix. Secure with tape. 4. Dont bathe or soak in a tub or go swimming until your incisions are well healed. Activity   Dont drive while taking pain medication.  Dont stand or sit with your feet down for long. When you sit, raise your feet as high as you comfortably can.  Take your medicines exactly as directed. Dont skip doses.  Avoid skin burns by testing the temperature of shower water before you get in.  Wear slippers or shoes when walking. Dont go barefoot or wear open-toed shoes. When to call your healthcare provider    Call your provider right away if you have any of the following:   Fever of 100.4°F (38°C)   Signs of infection (redness, swelling, or warmth at the incision site)   Drainage from your incision   Changes in color, temperature, feeling, or movement in either leg or foot   Increasing pain or numbness in your foot or leg   Leg swelling that doesn't get better overnight   Chest pain or trouble breathing    Long-term changes at home   Eat a healthy, low-fat, low cholesterol, and low calorie diet.  Maintain your ideal body weight.  After you have recovered from surgery, try to exercise more, especially walking.  If you smoke, ask your primary doctor for help quitting.       Please call the office at [phone] for any issues    Follow-up Information     Follow up With Specialties Details Why Contact Info    [facility],[address]Th Floor of [address] Kristina Combs, [address] [place] [address] Nurse Practitioner Schedule an appointment as soon as possible for a visit in 1 week  [address] [facility]  [phone]      Alonso Payne DPM Podiatry Schedule an appointment as soon as possible for a visit in 3 weeks  [facility]  [phone]      Jaswant Moffett MD Vascular Surgery  We will contact patient with his follow up appointment.   [address] [place] [address]  [phone]            Signed:  Ant Avila NP  10/15/2021  11:35 AM

## 2021-10-15 NOTE — DISCHARGE INSTRUCTIONS
Discharge Instructions After Vascular Surgery   Home care  1. Float heels off of pillow when in bed. 2. Right foot: Remove old bandage. Clean with sterile saline. Apply adaptic to the wound. Cover with 4x4\" gauze. Wrap with Kerlix. Secure with tape. Wear post op shoe at all times when out of bed. Weight bear as tolerated right foot. 3. Left foot wounds (left hallux, left 2nd toe, left 5th toe): Once daily clean with dermal skin . Apply Silvasorb gel, cover with gauze. Wrap gently with kerlix. Secure with tape. 4. Dont bathe or soak in a tub or go swimming until your incisions are well healed. Activity   Dont drive while taking pain medication.  Dont stand or sit with your feet down for long. When you sit, raise your feet as high as you comfortably can.  Take your medicines exactly as directed. Dont skip doses.  Avoid skin burns by testing the temperature of shower water before you get in.  Wear slippers or shoes when walking. Dont go barefoot or wear open-toed shoes. When to call your healthcare provider    Call your provider right away if you have any of the following:   Fever of 100.4°F (38°C)   Signs of infection (redness, swelling, or warmth at the incision site)   Drainage from your incision   Changes in color, temperature, feeling, or movement in either leg or foot   Increasing pain or numbness in your foot or leg   Leg swelling that doesn't get better overnight   Chest pain or trouble breathing    Long-term changes at home   Eat a healthy, low-fat, low cholesterol, and low calorie diet.  Maintain your ideal body weight.  After you have recovered from surgery, try to exercise more, especially walking.  If you smoke, ask your primary doctor for help quitting.       Please call the office at [phone] for any issues

## 2021-10-15 NOTE — PROGRESS NOTES
Sbar report called [facility] to nurse Horace Yeboah LPN , With opportunity for questions and clarification . pt.d/patricia via ambulance to Rehab.  Wife at bedside,no episodes of acute distress noted offered no c/o pain

## 2021-10-21 NOTE — PROGRESS NOTES
Post Acute Facility Update     *The information contained in this note was received during a weekly care coordination call with the post acute facility*    Current Facility: [address] [place] [address], Jacob Wolf ([facility])  Anticipated Discharge Date: [facility]    Facility Nursing Update: Resident was admitted under skilled care and plans to return home once goals are met. Resident lives alone in a 1 story home with 5 steps to enter with bilateral railing. Resident has W/C, rollator, FWW, cane at home. SS will assist as needed with discharge planning such as home health and equipment needs. Facility Social Work Update: Resident was admitted under skilled care and plans to return home once goals are met. Resident lives with wife in a 1 sotry home with 4 steps with B HR. Resident has cane, walker, shower chair at home. SS will assist as needed with discharge planning such as home health and equipment needs. Bundle Program Status: none  Upper Extremity Assistance: Stand by Assist - Presence and Cueing  Lower Extremity Assistance: Minimal Assistance   Bed Mobility: Contact Guard Assist - hands on patient for balance   Transfers: Contact Guard Assist - hands on patient for balance   Ambulation: Contact Guard Assist - hands on patient for balance   How far (in feet) is the patient ambulating?  10-15  Device Used: Walker  Barriers to Discharge: THI Ocampo, MSW  [facility]

## 2021-11-02 PROBLEM — N17.9 ARF (ACUTE RENAL FAILURE) (HCC): Status: ACTIVE | Noted: 2021-01-01

## 2021-11-03 NOTE — PROGRESS NOTES
Hospitalist Progress Note NAME: Faviola Andres :  1933 MRN:  [id number] Assessment / Plan: 
Acute renal failure, likely secondary to post renal obstruction Abdo CT imaging reviewed Nephrology eval appreciated Urology eval appreciated Trend Cr 
Continue with IV ?tarry stool Hold Heparin AC Send occult stool Trend Hgb Will ask for GI eval if appropriate HTN 
BPH 
CAD Continue home meds 18.5 - 24.9 Normal weight / Body mass index is 19.94 kg/m². Estimated discharge date:  Barriers: 
 
Code status: Full Prophylaxis: Hold Heparin SQ Recommended Disposition: TBD Subjective: Chief Complaint / Reason for Physician Visit Admits to being ornery, feeling well otherwise. Discussed with RN events overnight. Review of Systems: 
Symptom Y/N Comments  Symptom Y/N Comments Fever/Chills    Chest Pain Poor Appetite    Edema Cough    Abdominal Pain Sputum    Joint Pain SOB/AUGUSTIN    Pruritis/Rash Nausea/vomit    Tolerating PT/OT Diarrhea    Tolerating Diet Constipation    Other Could NOT obtain due to:   
 
Objective: VITALS:  
Last 24hrs VS reviewed since prior progress note. Most recent are: 
Patient Vitals for the past 24 hrs: 
 Temp Pulse Resp BP SpO2  
21 1023 98 °F (36.7 °C) 65 16 121/69 95 % 21 2325  80 16 (!) 135/90   
21 2130    132/67   
21 2050    127/82 95 % 21 1959 97.9 °F (36.6 °C)  18 131/62  No intake or output data in the 24 hours ending 21 1217 I had a face to face encounter and independently examined this patient on 11/3/2021, as outlined below: PHYSICAL EXAM: 
General: Alert, cooperative, no acute distress EENT:  EOMI. Anicteric sclerae. MMM Resp:  CTA bilaterally, no wheezing or rales. No accessory muscle use CV:  Regular  rhythm,  No edema GI:  Soft, Non distended, Non tender. +Bowel sounds Neurologic:  Alert and oriented X 3, normal speech, Psych:   Good insight. Not anxious nor agitated Skin:  No rashes. No jaundice Reviewed most current lab test results and cultures  YES Reviewed most current radiology test results   YES Review and summation of old records today    NO Reviewed patient's current orders and MAR    YES 
PMH/SH reviewed - no change compared to H&P 
________________________________________________________________________ Care Plan discussed with: 
  Comments Patient x Family  x   
RN x Care Manager Consultant Multidiciplinary team rounds were held today with , nursing, pharmacist and clinical coordinator. Patient's plan of care was discussed; medications were reviewed and discharge planning was addressed. ________________________________________________________________________ Total NON critical care TIME:  35   Minutes Total CRITICAL CARE TIME Spent:   Minutes non procedure based Comments >50% of visit spent in counseling and coordination of care    
________________________________________________________________________ Jenna Dean MD  
 
Procedures: see electronic medical records for all procedures/Xrays and details which were not copied into this note but were reviewed prior to creation of Plan. LABS: 
I reviewed today's most current labs and imaging studies. Pertinent labs include: 
Recent Labs 11/03/21 
3075 11/02/21 2015 WBC 9.2 10.8 HGB 7.3* 8.0*  
HCT 23.6* 26.5*  
 255 Recent Labs 11/03/21 
[id number] 11/02/21 2015  141  
K 4.0 3.7 * 109* CO2 21 22 GLU 98 105* * 126* CREA 4.26* 5.05* CA 8.3* 8.4* ALB  --  2.0*  
TBILI  --  0.2 ALT  --  18 Signed: Jenna Dean MD

## 2021-11-03 NOTE — H&P
Hospitalist Admission Note NAME: Mak Santos :  1933 MRN:  [id number] Date/Time:  2021 9:46 PM 
 
Patient PCP: RYLIE [place] 
______________________________________________________________________ Given the patient's current clinical presentation, I have a high level of concern for decompensation if discharged from the emergency department. Complex decision making was performed, which includes reviewing the patient's available past medical records, laboratory results, and x-ray films. My assessment of this patient's clinical condition and my plan of care is as follows. Assessment / Plan: 
Acute on chronic renal failure Admit patient to telemetry IV fluid Avoid nephrotoxic medication nephrology consultation 
--Follow-up CT abdomen Hypertensionhold antihypertensive medication BPHcontinue finasteride History of CADcontinue aspirin Left leg wound/right leg wound Wound care consultation vascular consultation Code Status: Full Surrogate Decision Maker: DVT Prophylaxis: Heparin GI Prophylaxis: not indicated Subjective: CHIEF COMPLAINT: Abnormal renal function HISTORY OF PRESENT ILLNESS:    
80years old male from nursing home with past medical history significant for CAD, history of liver disease, hypertension, history of aortic valve stenosis presented to the hospital for evaluation of abnormal renal function, patient denies any complaint, patient was recently discharged from the hospital October 15, s/p right foot TMA October 10, patient was discharged home with oral antibiotic Augmentin, blood work in ED was significant for creatinine 5.05, patient baseline creatinine 1.5-2. We were asked to admit for work up and evaluation of the above problems. Past Medical History:  
Diagnosis Date  CAD (coronary artery disease), native coronary artery  RCA  Creatinine elevation   H/O aortic valve stenosis   
 post valve replacement  Hypertension  Liver disease Hepatits A in 1980s  Pacemaker  Raynaud's disease  SSS (sick sinus syndrome) ([facility] [address]   
 pacemaker  Valvular heart disease Past Surgical History:  
Procedure Laterality Date  HX AORTIC VALVE REPLACEMENT    
 HX PACEMAKER    
 VASCULAR SURGERY PROCEDURE UNLIST  03/2021  
 to legs Social History Tobacco Use  Smoking status: Current Every Day Smoker Packs/day: 0.25 Years: 30.00 Pack years: 7.50 Types: Cigarettes  Smokeless tobacco: Never Used Substance Use Topics  Alcohol use: Yes Alcohol/week: 1.0 standard drinks Types: 1 Glasses of wine per week Comment: weekly Family History Problem Relation Age of Onset  Stroke Mother  Stroke Father  Cancer Sister  Heart Disease Brother MI Allergies Allergen Reactions  Codeine Other (comments)  
   pt states that he became high with morphine--yrs ago 
 pt states that he became high with morphine--yrs ago  Simvastatin Myalgia and Other (comments) And weakness And weakness Prior to Admission medications Medication Sig Start Date End Date Taking? Authorizing Provider  
aspirin delayed-release 81 mg tablet Take 1 Tablet by mouth daily. 10/16/21   Musa Padilla NP  
L.acid,para-B. bifidum-S.therm (RISAQUAD) 8 billion cell cap cap Take 1 Capsule by mouth daily. While taking antibxs 10/16/21   Татьяна Rain NP  
melatonin 3 mg tablet Take 2 Tablets by mouth nightly. 10/15/21   Kaitlin MCBRIDE NP  
amoxicillin-clavulanate (Augmentin) 500-125 mg per tablet Take 1 Tablet by mouth two (2) times a day. 10/15/21   Musa Padilla NP  
finasteride (PROSCAR) 5 mg tablet Take 5 mg by mouth. 11/14/19   Provider, Historical  
metoprolol tartrate (LOPRESSOR) 25 mg tablet Take 25 mg by mouth daily.     Provider, Historical  
 
 
REVIEW OF SYSTEMS:    
I am not able to complete the review of systems because: The patient is intubated and sedated The patient has altered mental status due to his acute medical problems The patient has baseline aphasia from prior stroke(s) The patient has baseline dementia and is not reliable historian The patient is in acute medical distress and unable to provide information Total of 12 systems reviewed as follows:   
   POSITIVE= underlined text  Negative = text not underlined General:  fever, chills, sweats, generalized weakness, weight loss/gain,  
   loss of appetite Eyes:    blurred vision, eye pain, loss of vision, double vision ENT:    rhinorrhea, pharyngitis Respiratory:   cough, sputum production, SOB, AUGUSTIN, wheezing, pleuritic pain  
Cardiology:   chest pain, palpitations, orthopnea, PND, edema, syncope Gastrointestinal:  abdominal pain , N/V, diarrhea, dysphagia, constipation, bleeding Genitourinary:  frequency, urgency, dysuria, hematuria, incontinence Muskuloskeletal :  arthralgia, myalgia, back pain Hematology:  easy bruising, nose or gum bleeding, lymphadenopathy Dermatological: rash, ulceration, pruritis, color change / jaundice Endocrine:   hot flashes or polydipsia Neurological:  headache, dizziness, confusion, focal weakness, paresthesia, Speech difficulties, memory loss, gait difficulty Psychological: Feelings of anxiety, depression, agitation Objective: VITALS:   
Visit Vitals /62 (BP 1 Location: Left upper arm, BP Patient Position: At rest;Supine) Temp 97.9 °F (36.6 °C) Resp 18 Ht 5' 11\" (1.803 m) Wt 64.9 kg (143 lb) BMI 19.94 kg/m² PHYSICAL EXAM: 
 
General:    Alert, cooperative, no distress, appears stated age. HEENT: Atraumatic, anicteric sclerae, pink conjunctivae No oral ulcers, mucosa moist, throat clear, dentition fair Neck:  Supple, symmetrical,  thyroid: non tender Lungs:   Clear to auscultation bilaterally. No Wheezing or Rhonchi.  No rales. 
Chest wall:  No tenderness  No Accessory muscle use. Heart:   Regular  rhythm,  No  murmur   No edema Abdomen:   Soft, non-tender. Not distended. Bowel sounds normal 
Extremities: Bilateral leg dressing, right foot wound covered with a dressing Skin turgor normal, Capillary refill normal, Radial dial pulse 2+ Skin:     Not pale. Not Jaundiced  No rashes Psych:  Good insight. Not depressed. Not anxious or agitated. Neurologic: EOMs intact. No facial asymmetry. No aphasia or slurred speech. Symmetrical strength, Sensation grossly intact. Alert and oriented X 4.  
 
_______________________________________________________________________ Care Plan discussed with: 
  Comments Patient y Family RN y   
Care Manager Consultant:     
_______________________________________________________________________ Expected  Disposition:  
Home with Family  y HH/PT/OT/RN   
SNF/LTC   
MANUEL   
________________________________________________________________________ TOTAL TIME:  55 Minutes Critical Care Provided     Minutes non procedure based Comments  
 y Reviewed previous records  
>50% of visit spent in counseling and coordination of care y Discussion with patient and/or family and questions answered 
  
 
________________________________________________________________________ Signed: Elisha Antunez MD 
 
Procedures: see electronic medical records for all procedures/Xrays and details which were not copied into this note but were reviewed prior to creation of Plan. LAB DATA REVIEWED:   
Recent Results (from the past 24 hour(s)) CBC WITH AUTOMATED DIFF Collection Time: 11/02/21  8:15 PM  
Result Value Ref Range WBC 10.8 4.1 - 11.1 K/uL  
 RBC 2.75 (L) 4.10 - 5.70 M/uL HGB 8.0 (L) 12.1 - 17.0 g/dL HCT 26.5 (L) 36.6 - 50.3 % MCV 96.4 80.0 - 99.0 FL  
 MCH 29.1 26.0 - 34.0 PG  
 MCHC 30.2 30.0 - 36.5 g/dL  
 RDW 16.7 (H) 11.5 - 14.5 %  PLATELET 577 589 - 400 K/uL MPV 10.9 8.9 - 12.9 FL  
 NRBC 0.0 0  WBC ABSOLUTE NRBC 0.00 0.00 - 0.01 K/uL NEUTROPHILS 85 (H) 32 - 75 % LYMPHOCYTES 8 (L) 12 - 49 % MONOCYTES 5 5 - 13 % EOSINOPHILS 1 0 - 7 % BASOPHILS 0 0 - 1 % IMMATURE GRANULOCYTES 1 (H) 0.0 - 0.5 % ABS. NEUTROPHILS 9.2 (H) 1.8 - 8.0 K/UL  
 ABS. LYMPHOCYTES 0.8 0.8 - 3.5 K/UL  
 ABS. MONOCYTES 0.6 0.0 - 1.0 K/UL  
 ABS. EOSINOPHILS 0.2 0.0 - 0.4 K/UL  
 ABS. BASOPHILS 0.0 0.0 - 0.1 K/UL  
 ABS. IMM. GRANS. 0.1 (H) 0.00 - 0.04 K/UL  
 DF AUTOMATED METABOLIC PANEL, COMPREHENSIVE Collection Time: 11/02/21  8:15 PM  
Result Value Ref Range Sodium 141 136 - 145 mmol/L Potassium 3.7 3.5 - 5.1 mmol/L Chloride 109 (H) 97 - 108 mmol/L  
 CO2 22 21 - 32 mmol/L Anion gap 10 5 - 15 mmol/L Glucose 105 (H) 65 - 100 mg/dL  (H) 6 - 20 MG/DL Creatinine 5.05 (H) 0.70 - 1.30 MG/DL  
 BUN/Creatinine ratio 25 (H) 12 - 20 GFR est AA 13 (L) >60 ml/min/1.73m2 GFR est non-AA 11 (L) >60 ml/min/1.73m2 Calcium 8.4 (L) 8.5 - 10.1 MG/DL Bilirubin, total 0.2 0.2 - 1.0 MG/DL  
 ALT (SGPT) 18 12 - 78 U/L  
 AST (SGOT) 28 15 - 37 U/L Alk. phosphatase 92 45 - 117 U/L Protein, total 6.0 (L) 6.4 - 8.2 g/dL Albumin 2.0 (L) 3.5 - 5.0 g/dL Globulin 4.0 2.0 - 4.0 g/dL A-G Ratio 0.5 (L) 1.1 - 2.2 EKG, 12 LEAD, INITIAL Collection Time: 11/02/21  8:54 PM  
Result Value Ref Range Ventricular Rate 65 BPM  
 Atrial Rate 68 BPM  
 QRS Duration 194 ms Q-T Interval 504 ms QTC Calculation (Bezet) 524 ms Calculated R Axis -81 degrees Calculated T Axis 96 degrees Diagnosis Ventricular-paced rhythm When compared with ECG of 06-OCT-2021 13:06, 
premature ventricular complexes are no longer present Vent.  rate has decreased BY  13 BPM

## 2021-11-03 NOTE — PROGRESS NOTES
Transition of Care Plan: 
 
RUR: 20% high risk Disposition: TBD HH vs SNF Follow up appointments: PCP, Vascular DME needed: Pt has a cane and RW Transportation at Discharge: Likely BLS [place] or means to access home: TBD pending deposition IM Medicare Letter: To be given prior to d/c Is patient a BCPI-A Bundle:      No   
 If yes, was Bundle Letter given?:    
Caregiver Contact: Wife, Amanda Hartmann ([phone]) Discharge Caregiver contacted prior to discharge? CM will contact cg prior to d/c Reason for Readmission:     Acute renal failure RUR Score/Risk Level:     20% high risk PCP: First and Last name:  Dr. Cata Torres Name of Practice: [address] [place] [address] Are you a current patient: Yes/No: Yes Approximate date of last visit: Unsure Can you participate in a virtual visit with your PCP: No 
 
Is a Care Conference indicated:  Not at this time Did you attend your follow up appointment (s): If not, why not: N/A - pt d/c to a SNF Resources/supports as identified by patient/family:   Pt's family are very supportive Top Challenges facing patient (as identified by patient/family and CM): Finances/Medication cost?    Pt has no problems with affording medications Transportation      Prior to hospitalization, pt was able to drive himself Support system or lack thereof? Pt's wife and sons are supportive Living arrangements? Pt lives with his wife in a 2 story home with 5 TIFFANIE Self-care/ADLs/Cognition? Pt is alert and oriented. Prior to 1st hospitalization, pt was independent of all ADL/IADLs Current Advanced Directive/Advance Care Plan:  Pt's wife is his LNOK Plan for utilizing home health:   TBD pending PT/OT [place] Transition of Care Plan:    Based on readmission, the patient's previous Plan of Care 
 has been evaluated and/or modified.  The current Transition of Care Plan is:       TBD HH vs SNF 
 
CM met with patient and patient's wife at the bedside to complete initial assessment. CM introduced role, verified demographics, and discussed discharge planning. Pt is an 79 yo male with an admitting diagnosis of Acute renal failure. Prior to hospitalization, pt was independent of all ADL/IADLs and driving. Pt d/c to SNF to [address] [facility] prior to this readmission. Pt and pt's family are trying to decide if pt will d/c back to SNF or home with [facility] and DME. Pt will need BLS transport at d/c. CM will continue to follow for discharge planning while patient is admitted on current unit. Please contact this CM with questions or issues related to discharge. Care Management Interventions PCP Verified by CM: Yes (Dr. Severo Combes - unsure of last visit) Mode of Transport at Discharge: BLS Transition of Care Consult (CM Consult): Discharge Planning MyChart Signup:  (Pt has a cane and RW) Discharge Durable Medical Equipment: No 
Physical Therapy Consult: No 
Occupational Therapy Consult: No 
Speech Therapy Consult: No 
Support Systems: Spouse/Significant Other, Child(prasanna) Confirm Follow Up Transport: Family Discharge Location Discharge Placement: Unable to determine at this time (SNF vs ) Readmission Assessment Number of days since last admission?: 8-30 days Previous disposition: SNF ([facility] [facility]) Who is being interviewed?: Patient, Caregiver What was the patient's/caregiver's perception as to why they think they needed to return back to the hospital?: Other (Comment) (Medical necessity) Did you visit your Primary Care Physician after you left the hospital, before you returned this time?: No 
Why weren't you able to visit your PCP?: Other (Comment) (Pt was d/c to SNF) Did you see a specialist, such as Cardiac, Pulmonary, Orthopedic Physician, etc. after you left the hospital?: No 
Who advised the patient to return to the hospital?: Skilled Unit Does the patient report anything that got in the way of taking their medications?: No 
In our efforts to provide the best possible care to you and others like you, can you think of anything that we could have done to help you after you left the hospital the first time, so that you might not have needed to return so soon?: Other (Comment) (readmit was medical necessity) ELIZABETH Valentin Care Manager [facility] 
[phone]

## 2021-11-03 NOTE — CONSULTS
Urology Consult Patient: Tony Garcia MRN: [id number]  SSN: [id number] YOB: 1933  Age: 80 y.o. Sex: male Date of Consultation:  November 3, 2021 Requesting Physician: Landon Sanchez MD 
Reason for Consultation: Hydrouteronephrosis History of Present Illness:  Tony Garcia is a 80 y.o. male admitted 11/2/2021 to the hospital for ARF (acute renal failure) ([facility] [address] [N17.9]. He presented to the hospital for evaluation of abnormal renal function, patient denies any complaint, patient was recently discharged from the hospital October 15, s/p right foot TMA October 10, patient was discharged home with oral antibiotic Augmentin, blood work in ED was significant for creatinine 5.05, patient baseline creatinine 1.5-2. Urology was consulted for ARF with locke in placed CT showed Moderate hydroureteronephrosis on the left with mild hydronephrosis on the right. Locke catheter within the urinary bladder which is mildly distended. Cr.  5.05 now at 4.26 Pt known to VU followed by Dr. Yolanda Mcmahon for BPH , 2.1 renal cyst Bosniak 2 last seen in office 9/202 . Pt recently discharged to [facility] after vascular surgery. Locke was placed on 11/1 at [facility] no documentation as to how much he was retaining . Bladder on CT appears to be mildly distended with moderate hydro L>R . Pt denies any back pain or discomfort CT reviewed from 8/2021 shows no hydro stable renal cyst . Past Medical History:  
Diagnosis Date  CAD (coronary artery disease), native coronary artery  RCA  Creatinine elevation 2020  H/O aortic valve stenosis   
 post valve replacement  Hypertension  Liver disease Hepatits A in 1980s  Pacemaker  Raynaud's disease  SSS (sick sinus syndrome) ([facility] [address]   
 pacemaker  Valvular heart disease Past Surgical History:  
Procedure Laterality Date  HX AORTIC VALVE REPLACEMENT    
 HX PACEMAKER    
 VASCULAR SURGERY PROCEDURE UNLIST  2021  
 to legs Allergies Allergen Reactions  Codeine Other (comments)  
   pt states that he became high with morphine--yrs ago 
 pt states that he became high with morphine--yrs ago  Simvastatin Myalgia and Other (comments) And weakness And weakness Prior to Admission medications Medication Sig Start Date End Date Taking? Authorizing Provider  
aspirin delayed-release 81 mg tablet Take 1 Tablet by mouth daily. 10/16/21   Tiffany Estrada NP  
L.acid,para-B. bifidum-S.therm (RISAQUAD) 8 billion cell cap cap Take 1 Capsule by mouth daily. While taking antibxs 10/16/21   Татьяна Rain NP  
melatonin 3 mg tablet Take 2 Tablets by mouth nightly. 10/15/21   Mayte MCBRIDE NP  
amoxicillin-clavulanate (Augmentin) 500-125 mg per tablet Take 1 Tablet by mouth two (2) times a day. 10/15/21   Tiffany Estrada NP  
finasteride (PROSCAR) 5 mg tablet Take 5 mg by mouth. 19   Provider, Historical  
metoprolol tartrate (LOPRESSOR) 25 mg tablet Take 25 mg by mouth daily. Provider, Historical  
 
 
Social History Tobacco Use  Smoking status: Current Every Day Smoker Packs/day: 0.25 Years: 30.00 Pack years: 7.50 Types: Cigarettes  Smokeless tobacco: Never Used Substance Use Topics  Alcohol use: Yes Alcohol/week: 1.0 standard drink Types: 1 Glasses of wine per week Comment: weekly Family History Problem Relation Age of Onset  Stroke Mother  Stroke Father  Cancer Sister  Heart Disease Brother MI  
  
 
ROS:  Admission ROS from 2021 was reviewed and changes (other than per HPI) include: none. Physical Exam: 
 
Vital Signs:  Temp (24hrs), Av °F (36.7 °C), Min:97.9 °F (36.6 °C), Max:98 °F (36.7 °C) Blood pressure 121/69, pulse 65, temperature 98 °F (36.7 °C), resp. rate 16, height 5' 11\" (1.803 m), weight 64.9 kg (143 lb), SpO2 95 %.  
I&O's:  No intake/output data recorded. Appearance: well-developed NAD , frail , elderly Neck: supple Respiratory Effort: breathing easily Lower Extremity: no edema Abdomen/Flank: soft non-tender without masses; no CVA tenderness Liver/Spleen: no organomegaly Hernia: no ventral hernia Anus/Perineum: non-tender Rectal: normal tone, no masses Hemoccult: not indicated Scrotum: without lesions Testes: bilaterally non-tender, no masses Epididymes: bilaterally no masses palpated, non-tender Penis: no plaques; no lesions Meatus: patent Prostate: symmetric, non-tender, no nodules SV's: non-palpable Lymph: no adenopathy Skin Inspection: warm and dry Mood/Affect: normal 
 
 
Lab Review: CBC Recent Labs 11/03/21 
[id number] 11/02/21 2015 WBC 9.2 10.8 HGB 7.3* 8.0*  
HCT 23.6* 26.5*  
 255 CMP Recent Labs 11/03/21 
[id number] 11/02/21 2015  141  
K 4.0 3.7 * 109* CO2 21 22 GLU 98 105* * 126* CREA 4.26* 5.05* CA 8.3* 8.4* ALB  --  2.0*  
TBILI  --  0.2 ALT  --  18 Other No results for input(s): INR, PSA, INREXT in the last 72 hours. No lab exists for component: PTT 
 
UA: No results found for: COLOR, APPRN, SPGRU, REFSG, ANICETO, PROTU, GLUCU, KETU, BILU, UROU, AMAURI, LEUKU, GLUKE, EPSU, BACTU, WBCU, RBCU, CASTS, UCRY Cultures:   
 
Imaging: CT: Results for orders placed during the hospital encounter of 11/02/21 CT ABD PELV WO CONT Narrative CLINICAL HISTORY: eval renal obstruction, new renal failure INDICATION: eval renal obstruction, new renal failure COMPARISON: None CONTRAST:  None. TECHNIQUE: 
Thin axial images were obtained through the abdomen and pelvis. Coronal and 
sagittal reformats were generated. Oral contrast was not administered. CT dose 
reduction was achieved through use of a standardized protocol tailored for this 
examination and automatic exposure control for dose modulation.  
 
The absence of intravenous contrast material reduces the sensitivity for 
evaluation of visceral organs and vasculature including presence of small mass 
lesions, hemodynamically significant stenoses, dissections, mucosal 
abnormalities etc. 
 
FINDINGS: 
LOWER THORAX: Cardiac pacer. Small to moderate left and small right pleural 
effusion. Basilar atelectasis. LIVER/GALLBLADDER: No mass. Cholelithiasis CBD is not dilated. SPLEEN/PANCREAS:  Splenic granulomas. ADRENALS/KIDNEYS: Unremarkable. Hydronephrosis on the left is mild-to-moderate. Mild hydronephrosis on the right. STOMACH: Unremarkable. SMALL BOWEL/COLON: Fecal stasis is severe. No dilatation or wall thickening. APPENDIX: Not visualized PERITONEUM: Minimal ascites. Antral hernia mesh on the right. RETROPERITONEUM: Aortic atherosclerotic change. REPRODUCTIVE ORGANS: Prostate calcifications. Locke catheter in the bladder. URINARY BLADDER: Locke catheter. BONES: Minimal anterolisthesis of L4 on L5. 
ADDITIONAL COMMENTS: N/A Impression Moderate hydroureteronephrosis on the left with mild hydronephrosis on the 
right. Locke catheter within the urinary bladder which is mildly distended. Small to moderate bilateral pleural effusions slightly greater on the left. Incidental and/or nonemergent findings are as described in detail above. Assessment:  
 
Active Problems: 
  ARF (acute renal failure) ([address] [address] (11/2/2021) Plan: 1. Hydrouretonephrosis 2/ FAM foleyy placed at facilty hpwever bladder still distended on CT . Will replace locke , monitor Cr. Ifno improvement will consider cysto possible sten placement 2. Anemia - hgb 7.3 this morning , stool sent for occult , holding heparin  managmemnt per primary team  
 
Signed By: Azael Mckinney. Sujit Crocker NP  - 11/03/2021 Pt seen, agree with above Images and labs reviewed Locke changed, clear urine. Bladder scan reveals him to be empty Continue locke Repeat US in am

## 2021-11-03 NOTE — CONSULTS
Nephrology Consult Note Hector Shi  
 
[email or web address]              Phone - [phone] Patient: Raymon Magallon YOB: 1933 Date- 11/3/2021 MRN: [id number] REASON FOR CONSULTATION: YANCY 
CONSULTING PHYSICIAN: DR. RODRIGUEZ   
ADMIT DATE:11/2/2021 PATIENT PCP:Irineo Shrestha FNP IMPRESSION & PLAN:  
 Acute kidney injury likely due to post renal obstruction  Bilateral hydronephrosis left > right  CKD stage IIIb baseline creatinine close to 2.0  
 history of hypertension  History of BPH  Anemia  History of aortic stenosis  PLAN- 
· Continue IV fluids · Urology consult · Adjust Locke catheter · Avoid hypotension · Avoid NSAIDs · Check UA · Check urine electrolytes · CHECK Iron PANEL 
· Daily BMP Active Problems: 
  ARF (acute renal failure) ([facility] [address] (11/2/2021) [] High complexity decision making was performed 
[] Patient is at high-risk of decompensation with multiple organ involvement Subjective: HPI: Raymon Magallon is a 80 y.o.  male. He was sent to the emergency room due to abnormal labs- he came from [facility]-  
It's not clear from patient or wife if he has chronic locke or Locke was placed at SNF. His creatinine level was 5.0 His recent creatinine 2.0 on October 11, 2021 He denies any nausea vomiting diarrhea prior to admission He has been treated with Augmentin recently He denies taking NSAIDs No documented hypotension CT Abdo shoeed---KIDNEYS:  Hydronephrosis on the left is mild-to-moderate. Mild hydronephrosis on the right. Review of Systems: A 11 point review of system was performed today. Pertinent positives and negatives are mentioned in the HPI. The reminder of the ROS is negative and noncontributory. Past Medical History:  
Diagnosis Date  CAD (coronary artery disease), native coronary artery  RCA  Chronic kidney disease (CKD) stage G3b/A2, moderately decreased glomerular filtration rate (GFR) between 30-44 mL/min/1.73 square meter and albuminuria creatinine ratio between  mg/g (HCC)  Creatinine elevation 2020  H/O aortic valve stenosis   
 post valve replacement  Hypertension  Liver disease Hepatits A in 1980s  Pacemaker  Raynaud's disease  SSS (sick sinus syndrome) ([facility] [address]   
 pacemaker  Valvular heart disease Past Surgical History:  
Procedure Laterality Date  HX AORTIC VALVE REPLACEMENT    
 HX PACEMAKER    
 VASCULAR SURGERY PROCEDURE UNLIST  03/2021  
 to legs Prior to Admission medications Medication Sig Start Date End Date Taking? Authorizing Provider  
aspirin delayed-release 81 mg tablet Take 1 Tablet by mouth daily. 10/16/21   Milla Carver NP  
L.acid,para-B. bifidum-S.therm (RISAQUAD) 8 billion cell cap cap Take 1 Capsule by mouth daily. While taking antibxs 10/16/21   Татьяна Rain NP  
melatonin 3 mg tablet Take 2 Tablets by mouth nightly. 10/15/21   Mercy MCBRIDE NP  
amoxicillin-clavulanate (Augmentin) 500-125 mg per tablet Take 1 Tablet by mouth two (2) times a day. 10/15/21   Milla Carver NP  
finasteride (PROSCAR) 5 mg tablet Take 5 mg by mouth. 11/14/19   Provider, Historical  
metoprolol tartrate (LOPRESSOR) 25 mg tablet Take 25 mg by mouth daily. Provider, Historical  
 
Allergies Allergen Reactions  Codeine Other (comments)  
   pt states that he became high with morphine--yrs ago 
 pt states that he became high with morphine--yrs ago  Simvastatin Myalgia and Other (comments) And weakness And weakness Social History Tobacco Use  Smoking status: Current Every Day Smoker Packs/day: 0.25 Years: 30.00 Pack years: 7.50 Types: Cigarettes  Smokeless tobacco: Never Used Substance Use Topics  Alcohol use: Yes Alcohol/week: 1.0 standard drink Types: 1 Glasses of wine per week Comment: weekly Family History Problem Relation Age of Onset  Stroke Mother  Stroke Father  Cancer Sister  Heart Disease Brother MI  
 
 
 Objective:   
 
Patient Vitals for the past 24 hrs: 
 Temp Pulse Resp BP SpO2  
11/03/21 1023 98 °F (36.7 °C) 65 16 121/69 95 % 11/02/21 2325  80 16 (!) 135/90   
11/02/21 2130    132/67   
11/02/21 2050    127/82 95 % 11/02/21 1959 97.9 °F (36.6 °C)  18 131/62  No intake/output data recorded. Last 3 Recorded Weights in this Encounter 11/02/21 1959 Weight: 64.9 kg (143 lb) Physical Exam: 
General:Alert, No distress, Eyes:No scleral icterus, No conjunctival pallor Neck:Supple,no mass palpable,no thyromegaly Lungs:Clears to auscultation Bilaterally, normal respiratory effort CVS:RRR, S1 S2 normal,  No rub, Abdomen:Soft, Non tender, No hepatosplenomegaly Extremities: trace LE edema Skin:No rash or lesions, Warm and DRY Psych: appropriate affect :  locke Musculoskeletal : no redness, no joint tenderness NEURO: Normal Speech, Non focal     
 
CODE STATUS:  full Care Plan discussed with:  Patient, wife Chart reviewed. 
 
y Reviewed previous records  
y Discussion with patient and/or family and questions answered ECG[de-identified] Rev:yes Xray/CT/US/MRI REV:yes 
CT ABDO--KIDNEYS: Hydronephrosis on the left is mild-to-moderate. Mild hydronephrosis on the right. Lab Data Personally Reviewed: (see below) Recent Labs 11/03/21 
5530 11/02/21 2015 WBC 9.2 10.8 HGB 7.3* 8.0*  
 255 ANEU  --  9.2*  141  
K 4.0 3.7 GLU 98 105* * 126* CREA 4.26* 5.05* ALT  --  18  
TBILI  --  0.2 AP  --  92  
CA 8.3* 8.4* No results found for: COLOR, APPRN, SPGRU, REFSG, ANICETO, PROTU, GLUCU, KETU, BILU, UROU, AMAURI, LEUKU, GLUKE, EPSU, BACTU, WBCU, RBCU, CASTS, UCRY No results found for: IRON, FE, TIBC, IBCT, PSAT, FERR Lab Results Component Value Date/Time  Culture result: HEAVY ENTEROCOCCUS FAECALIS (A) 10/10/2021 08:47 AM  
 Culture result: RARE PROTEUS SPECIES (A) 10/10/2021 08:47 AM  
 Culture result: LIGHT CITROBACTER KOSERI (A) 10/10/2021 08:47 AM  
 Culture result: NO ANAEROBES ISOLATED 10/10/2021 08:47 AM  
 
Prior to Admission Medications Prescriptions Last Dose Informant Patient Reported? Taking? L.acid,para-B. bifidum-S.therm (RISAQUAD) 8 billion cell cap cap   No No  
Sig: Take 1 Capsule by mouth daily. While taking antibxs  
amoxicillin-clavulanate (Augmentin) 500-125 mg per tablet   No No  
Sig: Take 1 Tablet by mouth two (2) times a day. aspirin delayed-release 81 mg tablet   No No  
Sig: Take 1 Tablet by mouth daily. finasteride (PROSCAR) 5 mg tablet  Self Yes No  
Sig: Take 5 mg by mouth.  
melatonin 3 mg tablet   No No  
Sig: Take 2 Tablets by mouth nightly. metoprolol tartrate (LOPRESSOR) 25 mg tablet  Self Yes No  
Sig: Take 25 mg by mouth daily. Facility-Administered Medications: None Imaging: 
 
Medications list Personally Reviewed   [x]      Yes     []               No   
Thank you for allowing us to participate in the care this patient. We will follow patient with you. Signed By: Janett Zamorano MD 
[facility] [address] [place], [address] [facility] Phone - [phone] Fax - [phone] [place] [id number], [address] [facility] Phone - [phone] Fax - [phone]    
[email or web address]. [email or web address]

## 2021-11-03 NOTE — ED PROVIDER NOTES
EMERGENCY DEPARTMENT HISTORY AND PHYSICAL EXAM 
 
 
Date: 11/2/2021 Patient Name: Derrick Huang Patient Age and Sex: 80 y.o. male History of Presenting Illness Chief Complaint Patient presents with  Abnormal Lab Results Pt's nephrologist told pt to admitted to ER for eval d/t elevated BUN (in the 190's) and concern for YANCY. History Provided By: Patient, EMS 
 
HPI: Derrick Huang is a 80year old history CKD, CAD, SSS, liver disease presenting with confusion and uremia. Patient denies any complaints at time presentation other than that he has recently been recovering from a vascular procedure and has some mild pain in his left leg. He denies chest pain, dyspnea. Patient is followed by Dr Annemarie Yu with cardiology. He reports a Rayo catheter was just recently placed History limited by altered mental status There are no other complaints, changes, or physical findings at this time. PCP: RYLIE Tan No current facility-administered medications on file prior to encounter. Current Outpatient Medications on File Prior to Encounter Medication Sig Dispense Refill  aspirin delayed-release 81 mg tablet Take 1 Tablet by mouth daily. 90 Tablet 3  
 L.acid,para-B. bifidum-S.therm (RISAQUAD) 8 billion cell cap cap Take 1 Capsule by mouth daily. While taking antibxs 7 Capsule 0  
 melatonin 3 mg tablet Take 2 Tablets by mouth nightly. 180 Tablet 3  
 amoxicillin-clavulanate (Augmentin) 500-125 mg per tablet Take 1 Tablet by mouth two (2) times a day. 14 Tablet 0  
 finasteride (PROSCAR) 5 mg tablet Take 5 mg by mouth.  metoprolol tartrate (LOPRESSOR) 25 mg tablet Take 25 mg by mouth daily. Past History Past Medical History: 
Past Medical History:  
Diagnosis Date  CAD (coronary artery disease), native coronary artery  RCA  Creatinine elevation 2020  H/O aortic valve stenosis   
 post valve replacement  Hypertension  Liver disease Hepatits A in 1980s  Pacemaker  Raynaud's disease  SSS (sick sinus syndrome) ([facility] [address]   
 pacemaker  Valvular heart disease Past Surgical History: 
Past Surgical History:  
Procedure Laterality Date  HX AORTIC VALVE REPLACEMENT    
 HX PACEMAKER    
 VASCULAR SURGERY PROCEDURE UNLIST  03/2021  
 to legs Family History: 
Family History Problem Relation Age of Onset  Stroke Mother  Stroke Father  Cancer Sister  Heart Disease Brother MI Social History: 
Social History Tobacco Use  Smoking status: Current Every Day Smoker Packs/day: 0.25 Years: 30.00 Pack years: 7.50 Types: Cigarettes  Smokeless tobacco: Never Used Substance Use Topics  Alcohol use: Yes Alcohol/week: 1.0 standard drinks Types: 1 Glasses of wine per week Comment: weekly  Drug use: Never Allergies: Allergies Allergen Reactions  Codeine Other (comments)  
   pt states that he became high with morphine--yrs ago 
 pt states that he became high with morphine--yrs ago  Simvastatin Myalgia and Other (comments) And weakness And weakness Review of Systems Review of Systems Musculoskeletal:  
     Positive for leg pain All other systems reviewed and are negative. ROS limited by altered mental status Physical Exam  
Physical Exam  
General: Alert, no acute distress Skin: Warm, dry Head: Normocephalic, atraumatic Eye: EOMI, normal conjunctiva Ears, Nose, Mouth and Throat: Oral mucosa moist, no pharyngeal erythema or exudate Cardiovascular: No murmur, normal peripheral perfusion, regular rhythm Pulmonary: Normal respiratory effort, normal breath sounds Gastrointestinal: Soft, nontender, nondistended Musculoskeletal: No swelling, no deformity Neurological: Alert and oriented to person, place, time. Normal speech observed. Moving all extremities symmetrically.   Answer most questions appropriately however somewhat confused regarding situation. Psychiatric: Cooperative, appropriate affect Diagnostic Study Results Labs Recent Results (from the past 12 hour(s)) CBC WITH AUTOMATED DIFF Collection Time: 11/02/21  8:15 PM  
Result Value Ref Range WBC 10.8 4.1 - 11.1 K/uL  
 RBC 2.75 (L) 4.10 - 5.70 M/uL HGB 8.0 (L) 12.1 - 17.0 g/dL HCT 26.5 (L) 36.6 - 50.3 % MCV 96.4 80.0 - 99.0 FL  
 MCH 29.1 26.0 - 34.0 PG  
 MCHC 30.2 30.0 - 36.5 g/dL  
 RDW 16.7 (H) 11.5 - 14.5 % PLATELET 140 543 - 890 K/uL MPV 10.9 8.9 - 12.9 FL  
 NRBC 0.0 0  WBC ABSOLUTE NRBC 0.00 0.00 - 0.01 K/uL NEUTROPHILS 85 (H) 32 - 75 % LYMPHOCYTES 8 (L) 12 - 49 % MONOCYTES 5 5 - 13 % EOSINOPHILS 1 0 - 7 % BASOPHILS 0 0 - 1 % IMMATURE GRANULOCYTES 1 (H) 0.0 - 0.5 % ABS. NEUTROPHILS 9.2 (H) 1.8 - 8.0 K/UL  
 ABS. LYMPHOCYTES 0.8 0.8 - 3.5 K/UL  
 ABS. MONOCYTES 0.6 0.0 - 1.0 K/UL  
 ABS. EOSINOPHILS 0.2 0.0 - 0.4 K/UL  
 ABS. BASOPHILS 0.0 0.0 - 0.1 K/UL  
 ABS. IMM. GRANS. 0.1 (H) 0.00 - 0.04 K/UL  
 DF AUTOMATED METABOLIC PANEL, COMPREHENSIVE Collection Time: 11/02/21  8:15 PM  
Result Value Ref Range Sodium 141 136 - 145 mmol/L Potassium 3.7 3.5 - 5.1 mmol/L Chloride 109 (H) 97 - 108 mmol/L  
 CO2 22 21 - 32 mmol/L Anion gap 10 5 - 15 mmol/L Glucose 105 (H) 65 - 100 mg/dL  (H) 6 - 20 MG/DL Creatinine 5.05 (H) 0.70 - 1.30 MG/DL  
 BUN/Creatinine ratio 25 (H) 12 - 20 GFR est AA 13 (L) >60 ml/min/1.73m2 GFR est non-AA 11 (L) >60 ml/min/1.73m2 Calcium 8.4 (L) 8.5 - 10.1 MG/DL Bilirubin, total 0.2 0.2 - 1.0 MG/DL  
 ALT (SGPT) 18 12 - 78 U/L  
 AST (SGOT) 28 15 - 37 U/L Alk. phosphatase 92 45 - 117 U/L Protein, total 6.0 (L) 6.4 - 8.2 g/dL Albumin 2.0 (L) 3.5 - 5.0 g/dL Globulin 4.0 2.0 - 4.0 g/dL A-G Ratio 0.5 (L) 1.1 - 2.2 EKG, 12 LEAD, INITIAL Collection Time: 11/02/21  8:54 PM  
Result Value Ref Range  Ventricular Rate 65 BPM  
 Atrial Rate 68 BPM  
 QRS Duration 194 ms Q-T Interval 504 ms QTC Calculation (Bezet) 524 ms Calculated R Axis -81 degrees Calculated T Axis 96 degrees Diagnosis Ventricular-paced rhythm When compared with ECG of 06-OCT-2021 13:06, 
premature ventricular complexes are no longer present Vent. rate has decreased BY  13 BPM 
  
 
 
Radiologic Studies -  
XR CHEST PORT Final Result Bilateral pleural effusions and left lower lobe atelectasis versus  
pneumonic infiltrate. CT ABD PELV WO CONT Final Result Moderate hydroureteronephrosis on the left with mild hydronephrosis on the  
right. Rayo catheter within the urinary bladder which is mildly distended. Small to moderate bilateral pleural effusions slightly greater on the left. Incidental and/or nonemergent findings are as described in detail above. CT Results  (Last 48 hours) 11/02/21 2146  CT ABD PELV WO CONT Final result Impression:  Moderate hydroureteronephrosis on the left with mild hydronephrosis on the  
right. Rayo catheter within the urinary bladder which is mildly distended. Small to moderate bilateral pleural effusions slightly greater on the left. Incidental and/or nonemergent findings are as described in detail above. Narrative:  CLINICAL HISTORY: eval renal obstruction, new renal failure INDICATION: eval renal obstruction, new renal failure COMPARISON: None CONTRAST:  None. TECHNIQUE:   
Thin axial images were obtained through the abdomen and pelvis. Coronal and  
sagittal reformats were generated. Oral contrast was not administered. CT dose  
reduction was achieved through use of a standardized protocol tailored for this  
examination and automatic exposure control for dose modulation.    
   
The absence of intravenous contrast material reduces the sensitivity for  
evaluation of visceral organs and vasculature including presence of small mass lesions, hemodynamically significant stenoses, dissections, mucosal  
abnormalities etc.  
   
FINDINGS:   
LOWER THORAX: Cardiac pacer. Small to moderate left and small right pleural  
effusion. Basilar atelectasis. LIVER/GALLBLADDER: No mass. Cholelithiasis CBD is not dilated. SPLEEN/PANCREAS:  Splenic granulomas. ADRENALS/KIDNEYS: Unremarkable. Hydronephrosis on the left is mild-to-moderate. Mild hydronephrosis on the right. STOMACH: Unremarkable. SMALL BOWEL/COLON: Fecal stasis is severe. No dilatation or wall thickening. APPENDIX: Not visualized PERITONEUM: Minimal ascites. Antral hernia mesh on the right. RETROPERITONEUM: Aortic atherosclerotic change. REPRODUCTIVE ORGANS: Prostate calcifications. Rayo catheter in the bladder. URINARY BLADDER: Rayo catheter. BONES: Minimal anterolisthesis of L4 on L5.  
ADDITIONAL COMMENTS: N/A  
   
  
  
 
CXR Results  (Last 48 hours) 11/02/21 2223  XR CHEST PORT Final result Impression:  Bilateral pleural effusions and left lower lobe atelectasis versus  
pneumonic infiltrate. Narrative:  EXAM: XR CHEST PORT INDICATION: Chest Pain COMPARISON: Chest x-ray 9/13/2021. FINDINGS: A portable AP radiograph of the chest was obtained at 22:19 hours. The  
patient is on a cardiac monitor. There are small bilateral pleural effusions and  
retrocardiac airspace opacity with no pneumothorax. Pacemaker generator body  
projects over the left chest wall with intact appearing leads traversing in  
expected course. Median sternotomy wires and aortic valve prosthesis are  
redemonstrated. The cardiac and mediastinal contours and pulmonary vascularity  
are normal.  Atherosclerotic calcifications affect the aortic arch and the  
thoracic aorta is tortuous. The chest wall structures and visualized upper  
abdomen show no acute findings with incidental note of degenerative spine and  
shoulder changes. Medical Decision Making I am the first provider for this patient. I reviewed the vital signs, available nursing notes, past medical history, past surgical history, family history and social history. Vital Signs-Reviewed the patient's vital signs. Patient Vitals for the past 12 hrs: 
 Temp Resp BP SpO2  
11/02/21 2130   132/67   
11/02/21 2050   127/82 95 % 11/02/21 1959 97.9 °F (36.6 °C) 18 131/62  Records Reviewed: Nursing Notes and Old Medical Records Provider Notes (Medical Decision Making):  
80year-old history CKD, PAD presenting with confusion, uremia is transferred from [facility] Will repeat CBC, CMP. Urinalysis with microscopic, CT without contrast to evaluate for obstructive process. ED Course:  
Initial assessment performed. The patients presenting problems have been discussed, and they are in agreement with the care plan formulated and outlined with them. I have encouraged them to ask questions as they arise throughout their visit. ED Course as of Nov 02 2346 Tue Nov 02, 2021 2034 Rayo catheter placed 2 days ago after procedure  
 [WB] 2043 CBC with stable anemia hemoglobin 8.0, white count 10.8, normal platelets neutrophil predominance  
 [WB] 2045 Did call [facility] for more information, though no answer  
 [WB] 2126 CMP demonstrates acute renal failure with creatinine 5,   
 [WB] 2127 Patient with Rayo catheter which is draining clear urine, will scan bladder to ensure no urinary retention  
 [WB] 2127 BUN/creatinine ratio is mildly elevated 25  
 [WB] 2130 Patient admitted 10/6 for surgical wound infection, PAD with ulceration bilateral lower extremities for text  
 [WB] 2132 Will obtain CT abdomen pelvis without contrast, start on IV fluids to evaluate for obstruction  
 [WB] ED Course User Index 
[WB] Nazario Benito MD  
 
Disposition: 
Admission Note: 
Patient is being admitted to the hospital by Dr. Tim Sosa, Service: Hospitalist.  The results of their tests and reasons for their admission have been discussed with them and available family. They convey agreement and understanding for the need to be admitted and for their admission diagnosis. Diagnosis Clinical Impression: 1. Acute renal failure, unspecified acute renal failure type ([facility] [address] Attestations: 
 
Mara Dorsey M.D. Please note that this dictation was completed with [employer], the computer voice recognition software. Quite often unanticipated grammatical, syntax, homophones, and other interpretive errors are inadvertently transcribed by the computer software. Please disregard these errors. Please excuse any errors that have escaped final proofreading. Thank you.

## 2021-11-03 NOTE — ED NOTES
Bedside shift change report given to Terry Vences RN (oncoming nurse) by [facility] RN (offgoing nurse). Report included the following information SBAR, ED Summary, Intake/Output and MAR.

## 2021-11-03 NOTE — WOUND CARE
Wound care consult for \"surgical sites present on admission\". These are referring to the leg and toe wounds and the bilateral pressure injuries. Assessment today: patient is alert, able to give a fairly good history. Patient was pulling back from any palpation to his feet or heels. WOUND POA CONDITIONS Wound Foot Anterior; Right mostly approximated suture line with an open area that needs to be dressed. (Active) Wound Image:  Right amputation site for Transmetatarsal. Partially open . 11/03/21 1505 Wound Etiology Surgical 11/03/21 1505 Dressing Status New dressing applied 11/03/21 1505 Cleansed Wound cleanser 11/03/21 1505 Dressing/Treatment Silver dressing; Gauze dressing/dressing sponge 11/03/21 1505 Dressing Change Due 11/05/21 11/03/21 1505 Wound Assessment Devitalized tissue; Pink/red; Other (Comment) 11/03/21 1505 Drainage Amount Scant 11/03/21 1505 Drainage Description Serosanguinous 11/03/21 1505 Wound Odor None 11/03/21 1505 Sissy-Wound/Incision Assessment Maceration 11/03/21 1505 Wound Thickness Description Full thickness 11/03/21 1505 Wound Toe (Comment  which one) Anterior; Left black eschar on first, second and third toes and open / sutured wound on 5th toe. (Active) Wound Image:  Betadine applied to the wound to cleanse the wound. Dry dressing applied. 11/03/21 1505 Wound Etiology Arterial 11/03/21 1505 Dressing Status New dressing applied 11/03/21 1505 Cleansed Betadine/Povidone Iodine 11/03/21 1505 Dressing/Treatment Dry dressing 11/03/21 1505 Dressing Change Due 11/05/21 11/03/21 1505 Wound Assessment Dry; Devitalized tissue; Eschar dry 11/03/21 1505 Drainage Amount None 11/03/21 1505 Wound Odor None 11/03/21 1505 Sissy-Wound/Incision Assessment Fragile; Hyperpigmented 11/03/21 1505 Wound Thickness Description Full thickness 11/03/21 1505 Wound Pretibial Left Open, clean, pink wound on left shin. Chronic (Active) Wound Image 11/03/21 1505 Wound Etiology Arterial 11/03/21 1505 Dressing Status New dressing applied 11/03/21 1505 Cleansed Wound cleanser 11/03/21 1505 Dressing/Treatment Silicone pad; Hydrating gel with Ag; Foam; Gauze dressing/dressing sponge 11/03/21 1505 Offloading for Diabetic Foot Ulcers Yes (type) 11/03/21 1505 Dressing Change Due 11/05/21 11/03/21 1505 Wound Length (cm) 11 cm 11/03/21 1505 Wound Width (cm) 5.2 cm 11/03/21 1505 Wound Depth (cm) 0.3 cm 11/03/21 1505 Wound Surface Area (cm^2) 57.2 cm^2 11/03/21 1505 Wound Volume (cm^3) 17.16 cm^3 11/03/21 1505 Wound Assessment Pale granulation tissue; Pink/red; Other (Comment) 11/03/21 1505 Drainage Amount Small 11/03/21 1505 Drainage Description Serosanguinous 11/03/21 1505 Wound Odor None 11/03/21 1505 Sissy-Wound/Incision Assessment Blanchable erythema; Dry/flaky; Fragile 11/03/21 1505 Wound Thickness Description Full thickness 11/03/21 1505 Wound Heel Right Unstageable heel pressure injury with moist eschar. (Active) Wound Image: Eschar covered wound on right heel. 11/03/21 1505 Wound Etiology Pressure Unstageable 11/03/21 1505 Dressing Status New dressing applied 11/03/21 1505 Cleansed Wound cleanser 11/03/21 1505 Dressing/Treatment Hydrating gel with Ag; Foam 11/03/21 1505 Offloading for Diabetic Foot Ulcers Yes (type) 11/03/21 1505 Dressing Change Due 11/05/21 11/03/21 1505 Wound Length (cm) 3.2 cm 11/03/21 1505 Wound Width (cm) 2.8 cm 11/03/21 1505 Wound Depth (cm) 0.1 cm 11/03/21 1505 Wound Surface Area (cm^2) 8.96 cm^2 11/03/21 1505 Wound Volume (cm^3) 0.896 cm^3 11/03/21 1505 Drainage Amount Small 11/03/21 1505 Drainage Description Serosanguinous 11/03/21 1505 Wound Odor None 11/03/21 1505 Sissy-Wound/Incision Assessment Blanchable erythema; Denuded; Fragile; Maceration 11/03/21 1505 Wound Thickness Description Full thickness 11/03/21 1505 Wound Heel Left Left heel evolving DTI (deep tissue injury) (Active) Wound Image   11/03/21 1505 Wound Etiology Deep Tissue/Injury 11/03/21 1505 Dressing Status New dressing applied 11/03/21 1505 Cleansed Wound cleanser 11/03/21 1505 Dressing/Treatment Hydrating gel with Ag; Foam 11/03/21 1505 Offloading for Diabetic Foot Ulcers Yes (type) 11/03/21 1505 Dressing Change Due 11/05/21 11/03/21 1505 Wound Assessment Pink/red; Purple/maroon 11/03/21 1505 Drainage Amount Scant 11/03/21 1505 Drainage Description Serosanguinous 11/03/21 1505 Wound Odor None 11/03/21 1505 Sissy-Wound/Incision Assessment Denuded 11/03/21 1505 Edges Defined edges 11/03/21 1505 Wound Thickness Description Full thickness 11/03/21 1505 Treatment and Plan: All wounds cleansed and dressed as stated above. Heels are floated with the Heelzup device.   
Francheska Islas, RN, BSN, [facility]

## 2021-11-04 NOTE — PROGRESS NOTES
Progress Note Patient: Tin Lafleur MRN: [id number]  SSN: [id number] YOB: 1933  Age: 80 y.o. Sex: male ADMITTED:  2021 to Jaclyn Felix MD  for ARF (acute renal failure) ([facility] [N17.9] Tin Lafleur was admitted for ARF (acute renal failure) ([facility] [N17.9]. Urology following for ARF (creat 5.05 and baseline known to be around 2) with CT abd showing hydronephrosis and bladder distention. Olcke exchanged in ED with clear urine, good UOP. Bladder scans have since revealed to be empty Chart reviewed: 
Af,vss 
Good UOP from locke Wbc wnl 
hgb dropped to 6.3, getting transfused, occult stool pending, reports of tarry stools UA likely colonized due to prolonged locke, culture in process. Notably UA with  rbc. NO gross hematuria. Creat 5 on admission, now 3.4 (baseline 2) CT abd results reviewed Vitals:  Temp (24hrs), Av.5 °F (36.4 °C), Min:97.3 °F (36.3 °C), Max:98 °F (36.7 °C) Blood pressure 127/64, pulse 65, temperature 97.5 °F (36.4 °C), resp. rate 16, height 5' 11\" (1.803 m), weight 64.9 kg (143 lb), SpO2 100 %. I&O's:   1901 -  0700 In: 600 [I.V.:600] Out: 2400 [Urine:2400] No intake/output data recorded. Exam:  General: alert, not oriented NAD. abdomen soft, NT 
: locke draining clear yellow UA Weak and frail Labs:  
Recent Labs 21 WBC 9.6 9.2 10.8 HGB 6.3* 7.3* 8.0*  
HCT 20.3* 23.6* 26.5*  
 250 255 Recent Labs 21 *  145 143 141  
K 3.6  3.6 4.0 3.7 *  115* 112* 109* CO2 22  23 21 22 GLU 93  93 98 105* *  110* 124* 126* CREA 3.40*  3.41* 4.26* 5.05* CA 8.3*  8.6 8.3* 8.4* Cultures:     
Imaging:    
 
Assessment: - Active Problems: 
  ARF (acute [facility] failure) ([facility] (2021) Nickolas hydronephrosis L>R with FAM and Urinary retention now with locke with good UOP 
 
BPH hx 
 
Renal cyst 
 
Anemia- 6.3, pending occult stool. GI consulted 
 
microhematuria YANCY-improving, nephrology following. Baseline creat 2 Plan:  
 
- continue locke at this time to allow for bladder decompression  
-pending repeat MARYAM today to reassess hydronephrosis 
-trend renal labs, appreciate nephrology input 
-holding cysto stent placement at this time as creat improving however pending US results Supervising MD, Dr. Geraldine Rice Signed By: Hanna Bhat, NIDIA - November 4, 2021

## 2021-11-04 NOTE — PROGRESS NOTES
Comprehensive Nutrition Assessment Type and Reason for Visit: Initial, Wound Nutrition Recommendations/Plan: 
Continue current diet Add ensure enlive BID Nutrition Assessment:    
Patient medically noted for YANCY on CKD. PMH HTN and CAD. Referral received for PI. Patient reports an okay appetite; not eating as much recently. No significant weight changes. He has been drinking ensure at home and agreeable to receiving ensure enlive while in the hospital. Encouraged PO intake of meals and supplement. Wt Readings from Last 6 Encounters:  
11/02/21 64.9 kg (143 lb) 10/06/21 64.4 kg (141 lb 15.6 oz) 08/24/21 64 kg (141 lb) 05/17/21 64.4 kg (141 lb 15.6 oz) 03/17/21 67 kg (147 lb 12.8 oz) 02/27/20 67.1 kg (148 lb) Estimated Daily Nutrient Needs: 
Energy (kcal): 1995 kcal (BMR 1342 x 1.3AF +250kcal); Weight Used for Energy Requirements: Current Protein (g): 78-98g (1.2-1.5 g/kg bw); Weight Used for Protein Requirements: Current Fluid (ml/day): 1750 mL; Method Used for Fluid Requirements: 1 ml/kcal 
 
Nutrition Related Findings:      
Na 146, BG 95-77- BM 11/3 Protonix Wounds:   
Surgical incision, Deep tissue injury, Unstageable Current Nutrition Therapies: ADULT DIET Dysphagia - Soft & Bite Sized Anthropometric Measures: 
· Height:  5' 11\" (180.3 cm) · Current Body Wt:  64.9 kg (143 lb 1.3 oz) · BMI Category:  Normal weight (BMI 18.5-24. 9) Nutrition Diagnosis:  
· Increased nutrient needs related to  (wound healing) as evidenced by  (documented PI) Nutrition Interventions:  
Food and/or Nutrient Delivery: Continue current diet, Start oral nutrition supplement Nutrition Education and Counseling: No recommendations at this time Coordination of Nutrition Care: No recommendation at this time Goals: 
PO intake >50% of meals/supplement next 3-5 days Nutrition Monitoring and Evaluation:  
Behavioral-Environmental Outcomes: None identified Food/Nutrient Intake Outcomes: Food and nutrient intake, Supplement intake Physical Signs/Symptoms Outcomes: Biochemical data, Skin, Weight Discharge Planning:   
Continue current diet, Continue oral nutrition supplement Electronically signed by Shahriar Miller RD on 11/4/2021 at 9:17 AM 
 
Contact: ext [phone]

## 2021-11-04 NOTE — CONSULTS
GI CONSULTATION NOTE [facility], NP 
[phone] NP in-hospital cell phone M-F until 4:30 After 5pm or on weekends, please call  for physician on call NAME: Susie Altman :  1933 MRN:  [id number] Attending: Thelma Torres MD 
Primary GI: Joey Sutton MD 
Date/Time:  2021 2:42 PM 
Assessment:  
- Patient is a 80year old male admitted to ED [facility] with hematuria now having melena x 2 for 1 wk. Started in inpt rehab. Intermittent diarrhea. Patient denies abdominal pain on subjective but subscribes to severe abdominal discomfort on palpation and PE. Abdominal/Pelvic CT 21 shows severe fecal stasis. Appetite is very decreased. Not eating solid foods. Tolerating full liquids. No N/V. Anemia. Hgb on admit 8.0 decreased to Hgb 6.3 . Heparin prophylaxis, VNC32ug daily, no other NSAID or anticoagulation. No home PPI use. PMH 
- Anemia Plan: - EGD tomorrow at 0900 with Dr Robert Huertas if son agrees - pt and spouse are nervous about another procedure. - Miralax BID for constipation - Full liquid diet today, NPO at 0000  
- COVID-19 test to rule out for procedure. - Continue high dose antacids as prescribed -  Serial H/H - transfuse for hgb < 7.0  - hgb must be greater than 7.0 for procedure - Rest per primary team.  
 
Addendum: Spoke to son, Donta Evans, at length about patient and getting EGD. He and mother are now agreeable to EGD. Will continue with this as scheduled tomorrow. Ordered COVID. Plan discussed with Dr Colt Muhammad. Thank you for consulation. Subjective:  
 
HISTORY OF PRESENT ILLNESS:    
Susie Altman is an 80 y.o.  male who we are asked to see for complaint of GI bleed. Patient has had multiple episodes of Melena over the past week. Denies nausea and vomiting, but having intermittent diarrhea since Melena episodes were established. No abnormal flatulence. Is on Heparin for DVT prophylaxis while inpatient. No anticoagulant therapy outpatient. Decreased appetite and not eating solid foods. Patient has been in rehab skilled nursing facility s/p surgery. Patient takes baby ASA but no other NSAIDS. Not on antacids outpatient. Is not using stool softeners/laxatives. FH sister had colon cancer. Has not had CLN since prior to age [de-identified] per patient - hx of colonic polyps. Reports has never had EGD. No history of ulcers noted. Past Medical History:  
Diagnosis Date  CAD (coronary artery disease), native coronary artery  RCA  Chronic kidney disease (CKD) stage G3b/A2, moderately decreased glomerular filtration rate (GFR) between 30-44 mL/min/1.73 square meter and albuminuria creatinine ratio between  mg/g (HCC)  Creatinine elevation 2020  H/O aortic valve stenosis   
 post valve replacement  Hypertension  Liver disease Hepatits A in 1980s  Pacemaker  Raynaud's disease  SSS (sick sinus syndrome) ([facility] [address]   
 pacemaker  Valvular heart disease Past Surgical History:  
Procedure Laterality Date  HX AORTIC VALVE REPLACEMENT    
 HX PACEMAKER    
 VASCULAR SURGERY PROCEDURE UNLIST  03/2021  
 to legs Social History Tobacco Use  Smoking status: Current Every Day Smoker Packs/day: 0.25 Years: 30.00 Pack years: 7.50 Types: Cigarettes  Smokeless tobacco: Never Used Substance Use Topics  Alcohol use: Yes Alcohol/week: 1.0 standard drink Types: 1 Glasses of wine per week Comment: weekly Family History Problem Relation Age of Onset  Stroke Mother  Stroke Father  Cancer Sister  Heart Disease Brother MI Allergies Allergen Reactions  Codeine Other (comments)  
   pt states that he became high with morphine--yrs ago 
 pt states that he became high with morphine--yrs ago  Simvastatin Myalgia and Other (comments) And weakness And weakness Prior to Admission medications Medication Sig Start Date End Date Taking? Authorizing Provider  
aspirin delayed-release 81 mg tablet Take 1 Tablet by mouth daily. 10/16/21   Radha Barreto NP  
LJosetteacid,para-B. bifidum-S.therm (RISAQUAD) 8 billion cell cap cap Take 1 Capsule by mouth daily. While taking antibxs 10/16/21   Татьяна Rain NP  
melatonin 3 mg tablet Take 2 Tablets by mouth nightly. 10/15/21   Edwige MCBRIDE NP  
amoxicillin-clavulanate (Augmentin) 500-125 mg per tablet Take 1 Tablet by mouth two (2) times a day. 10/15/21   Radha Barreto NP  
finasteride (PROSCAR) 5 mg tablet Take 5 mg by mouth. 11/14/19   Provider, Historical  
metoprolol tartrate (LOPRESSOR) 25 mg tablet Take 25 mg by mouth daily. Provider, Historical  
 
 
Patient Active Problem List  
Diagnosis Code  Bradycardia R00.1  SSS (sick sinus syndrome) ([facility] I49.5  Hyperlipidemia E78.5  
 H/O aortic valve stenosis Z86.79  
 CAD (coronary artery disease), native coronary artery I25.10  Cellulitis of left foot L03. 116  
 ARF (acute renal failure) ([facility]) N17.9 REVIEW OF SYSTEMS:   
Constitutional: negative fever, negative chills, negative weight loss Eyes:   negative visual changes ENT:   negative sore throat, tongue or lip swelling Respiratory:  negative cough, 
Cards:  negative for chest pain, palpitations, lower extremity edema GI:   See HPI 
:  negative for frequency, dysuria Integument:  negative for rash and pruritus Heme:  negative for easy bruising and gum/nose bleeding Musculoskel: negative for myalgias,  back pain Neuro: negative for headaches, vertigo Psych:  negative for feelings of anxiety, depression Pertinent Positives: decreased appetite, generalized weakness, malaise, dizziness Objective: VITALS:   
Visit Vitals /64 (BP 1 Location: Right upper arm, BP Patient Position: At rest) Pulse 65 Temp 97.5 °F (36.4 °C) Resp 16 Ht 5' 11\" (1.803 m) Wt 64.9 kg (143 lb) SpO2 100% BMI 19.94 kg/m² PHYSICAL EXAM:  
General: Alert, WD, WN, cooperative, no distress, appears stated age. Head:               Normocephalic, without obvious abnormality, atraumatic. Eyes:               Conjunctivae clear and pale, anicteric sclerae. Pupils are equal 
Nose:               Nares normal. No drainage. Throat:             Lips, mucosa, and tongue normal.   
Neck:               Supple, symmetrical,  no adenopathy Back:               Symmetric Lungs:             CTA bilaterally. No wheezing/rhonchi/rales. Chest wall:      No tenderness or deformity. No Accessory muscle use. Heart:              Regular rate and rhythm,  no murmur, rub or gallop. Abdomen:       Tender to palpation in all four quadrants. Patient has exceptional tenderness                               noted on palpation in LLQ. Not distended. Bowel sounds normal. No masses Extremities:     Atraumatic, No cyanosis. No edema. No clubbing Skin:                Texture, turgor normal. No rashes/lesions/jaundice Lymph:            Cervical, supraclavicular normal. 
Psych:             Good insight. Not depressed. Not anxious or agitated. Neurologic:      EOMs intact. No facial asymmetry. No aphasia or slurred speech. Normal                         strength, A/O X 3. LAB DATA REVIEWED:   
Recent Results (from the past 24 hour(s)) IRON PROFILE Collection Time: 11/04/21  2:09 AM  
Result Value Ref Range Iron 20 (L) 35 - 150 ug/dL TIBC 124 (L) 250 - 450 ug/dL Iron % saturation 16 (L) 20 - 50 % METABOLIC PANEL, COMPREHENSIVE Collection Time: 11/04/21  2:10 AM  
Result Value Ref Range Sodium 146 (H) 136 - 145 mmol/L Potassium 3.6 3.5 - 5.1 mmol/L Chloride 116 (H) 97 - 108 mmol/L  
 CO2 22 21 - 32 mmol/L Anion gap 8 5 - 15 mmol/L Glucose 93 65 - 100 mg/dL  (H) 6 - 20 MG/DL Creatinine 3.40 (H) 0.70 - 1.30 MG/DL  
 BUN/Creatinine ratio 32 (H) 12 - 20 GFR est AA 21 (L) >60 ml/min/1.73m2  GFR est non-AA 17 (L) >60 ml/min/1.73m2 Calcium 8.3 (L) 8.5 - 10.1 MG/DL Bilirubin, total 0.2 0.2 - 1.0 MG/DL  
 ALT (SGPT) 17 12 - 78 U/L  
 AST (SGOT) 19 15 - 37 U/L Alk. phosphatase 84 45 - 117 U/L Protein, total 5.4 (L) 6.4 - 8.2 g/dL Albumin 1.9 (L) 3.5 - 5.0 g/dL Globulin 3.5 2.0 - 4.0 g/dL A-G Ratio 0.5 (L) 1.1 - 2.2 MAGNESIUM Collection Time: 11/04/21  2:10 AM  
Result Value Ref Range Magnesium 2.4 1.6 - 2.4 mg/dL PHOSPHORUS Collection Time: 11/04/21  2:10 AM  
Result Value Ref Range Phosphorus 3.8 2.6 - 4.7 MG/DL  
CBC WITH AUTOMATED DIFF Collection Time: 11/04/21  2:10 AM  
Result Value Ref Range WBC 9.6 4.1 - 11.1 K/uL  
 RBC 2.13 (L) 4.10 - 5.70 M/uL HGB 6.3 (L) 12.1 - 17.0 g/dL HCT 20.3 (L) 36.6 - 50.3 % MCV 95.3 80.0 - 99.0 FL  
 MCH 29.6 26.0 - 34.0 PG  
 MCHC 31.0 30.0 - 36.5 g/dL  
 RDW 16.6 (H) 11.5 - 14.5 % PLATELET 493 692 - 976 K/uL MPV 10.7 8.9 - 12.9 FL  
 NRBC 0.0 0  WBC ABSOLUTE NRBC 0.00 0.00 - 0.01 K/uL NEUTROPHILS 83 (H) 32 - 75 % LYMPHOCYTES 7 (L) 12 - 49 % MONOCYTES 7 5 - 13 % EOSINOPHILS 2 0 - 7 % BASOPHILS 0 0 - 1 % IMMATURE GRANULOCYTES 1 (H) 0.0 - 0.5 % ABS. NEUTROPHILS 7.9 1.8 - 8.0 K/UL  
 ABS. LYMPHOCYTES 0.7 (L) 0.8 - 3.5 K/UL  
 ABS. MONOCYTES 0.7 0.0 - 1.0 K/UL  
 ABS. EOSINOPHILS 0.2 0.0 - 0.4 K/UL  
 ABS. BASOPHILS 0.0 0.0 - 0.1 K/UL  
 ABS. IMM. GRANS. 0.1 (H) 0.00 - 0.04 K/UL  
 DF SMEAR SCANNED    
 RBC COMMENTS ANISOCYTOSIS 1+ FERRITIN Collection Time: 11/04/21  2:10 AM  
Result Value Ref Range Ferritin 125 26 - 388 NG/ML  
RENAL FUNCTION PANEL Collection Time: 11/04/21  2:10 AM  
Result Value Ref Range Sodium 145 136 - 145 mmol/L Potassium 3.6 3.5 - 5.1 mmol/L Chloride 115 (H) 97 - 108 mmol/L  
 CO2 23 21 - 32 mmol/L Anion gap 7 5 - 15 mmol/L Glucose 93 65 - 100 mg/dL  (H) 6 - 20 MG/DL  Creatinine 3.41 (H) 0.70 - 1.30 MG/DL  
 BUN/Creatinine ratio 32 (H) 12 - 20 GFR est AA 21 (L) >60 ml/min/1.73m2 GFR est non-AA 17 (L) >60 ml/min/1.73m2 Calcium 8.6 8.5 - 10.1 MG/DL Phosphorus 3.8 2.6 - 4.7 MG/DL Albumin 1.8 (L) 3.5 - 5.0 g/dL SAMPLES BEING HELD Collection Time: 11/04/21  2:10 AM  
Result Value Ref Range SAMPLES BEING HELD SST LV   
 COMMENT Add-on orders for these samples will be processed based on acceptable specimen integrity and analyte stability, which may vary by analyte. RBC, ALLOCATE Collection Time: 11/04/21  9:15 AM  
Result Value Ref Range HISTORY CHECKED? Historical check performed TYPE & SCREEN Collection Time: 11/04/21  9:45 AM  
Result Value Ref Range Crossmatch Expiration 11/07/2021,2359 ABO/Rh(D) A POSITIVE Antibody screen NEG Unit number [id number] Blood component type RC LR Unit division 00 Status of unit ALLOCATED Crossmatch result Compatible IMAGING RESULTS: 
I have personally reviewed the imaging reports Total time spent with patient: 50 minutes ________________________________________________________________________ Care Plan discussed with: 
Patient y Family  y - spouse at bedside and attempted to call son, Ankita Hinton without success, will try again RN n  
           Consultant:  daron JENSEN  11/4/2021: 
________________________________________________________________________ 
 
___________________________________________________ Consulting Provider:  Alan Smith NP  
   11/4/2021  2:42 PM

## 2021-11-04 NOTE — PROGRESS NOTES
Hospitalist Progress Note NAME: Brock Yin :  1933 MRN:  [id number] Assessment / Plan: 
Acute renal failure, likely secondary to post renal obstruction Abdo CT imaging reviewed Nephrology eval appreciated Urology eval appreciated Trend Cr - improving Continue with IV Acute on chronic anemia, likely secondary to GI loss Hgb 6.3 - will transfuse 1 unit pRBC. Discussed with pt and his son Pratima Solo. Both consented to transfusion. Repeat Hgb every 12 hours IV PPI BID Occult stool still pending GI eval requested HTN 
BPH 
CAD Continue home meds Multiple LE wounds, POA Bilateral heel wounds, POA Wound care evals appreciated 18.5 - 24.9 Normal weight / Body mass index is 19.94 kg/m². Estimated discharge date:  Barriers: 
 
Code status: Full Prophylaxis: Hold Heparin SQ Recommended Disposition: TBD Subjective: Chief Complaint / Reason for Physician Visit Does not feel well - denies any CP or SOB. Discussed with RN events overnight. Review of Systems: 
Symptom Y/N Comments  Symptom Y/N Comments Fever/Chills    Chest Pain Poor Appetite    Edema Cough    Abdominal Pain Sputum    Joint Pain SOB/AUGUSTIN    Pruritis/Rash Nausea/vomit    Tolerating PT/OT Diarrhea    Tolerating Diet Constipation    Other Could NOT obtain due to:   
 
Objective: VITALS:  
Last 24hrs VS reviewed since prior progress note. Most recent are: 
Patient Vitals for the past 24 hrs: 
 Temp Pulse Resp BP SpO2  
21 1200  65     
21 1102 97.5 °F (36.4 °C) 65 16 127/64 100 % 21 0741 97.5 °F (36.4 °C) 68 18 (!) 157/67 93 % 21 0010 98 °F (36.7 °C) 64 16 (!) 149/71 95 % 21 1939 97.4 °F (36.3 °C) 64 17 115/63 96 % 21 1553 97.3 °F (36.3 °C) 65 16 134/62 97 % Intake/Output Summary (Last 24 hours) at 2021 1455 Last data filed at 2021 0600 Gross per 24 hour Intake 600 ml Output 1500 ml Net -900 ml I had a face to face encounter and independently examined this patient on 11/4/2021, as outlined below: PHYSICAL EXAM: 
General: Alert, cooperative, no acute distress EENT:  EOMI. Anicteric sclerae. MMM Resp:  CTA bilaterally, no wheezing or rales. No accessory muscle use CV:  Regular  rhythm,  No edema GI:  Soft, Non distended, Non tender. +Bowel sounds Neurologic:  Alert and oriented X 3, normal speech, Psych:   Good insight. Not anxious nor agitated Skin:  No rashes. No jaundice Reviewed most current lab test results and cultures  YES Reviewed most current radiology test results   YES Review and summation of old records today    NO Reviewed patient's current orders and MAR    YES 
PMH/SH reviewed - no change compared to H&P 
________________________________________________________________________ Care Plan discussed with: 
  Comments Patient x Family  x   
RN x Care Manager Consultant Multidiciplinary team rounds were held today with , nursing, pharmacist and clinical coordinator. Patient's plan of care was discussed; medications were reviewed and discharge planning was addressed. ________________________________________________________________________ Total NON critical care TIME:  35   Minutes Total CRITICAL CARE TIME Spent:   Minutes non procedure based Comments >50% of visit spent in counseling and coordination of care    
________________________________________________________________________ Minnie Wilkins MD  
 
Procedures: see electronic medical records for all procedures/Xrays and details which were not copied into this note but were reviewed prior to creation of Plan. LABS: 
I reviewed today's most current labs and imaging studies. Pertinent labs include: 
Recent Labs 11/04/21 
0210 11/03/21 
0449 11/02/21 2015 WBC 9.6 9.2 10.8 HGB 6.3* 7.3* 8.0*  
HCT 20.3* 23.6* 26.5*  
 250 255 Recent Labs 11/04/21 
0210 11/03/21 
[id number] 11/02/21 2015 *  145 143 141  
K 3.6  3.6 4.0 3.7 *  115* 112* 109* CO2 22  23 21 22 GLU 93  93 98 105* *  110* 124* 126* CREA 3.40*  3.41* 4.26* 5.05* CA 8.3*  8.6 8.3* 8.4* MG 2.4  --   --   
PHOS 3.8  3.8  --   --   
ALB 1.9*  1.8*  --  2.0*  
TBILI 0.2  --  0.2 ALT 17  --  18 Signed: Kenya Alas MD

## 2021-11-04 NOTE — PROGRESS NOTES
[facility] 
 [address] [facility], [facility] [address] [facility] Phone: [phone]   [phone]   
  
Nephrology Progress Note Syd Modi     1/6/1933     [id number] Date of Admission : 11/2/2021 11/04/21 CC: Follow up for [facility] Assessment and Plan · Acute kidney injury likely due to post renal obstruction · Bilateral hydronephrosis left > right · CKD stage IIIb baseline creatinine close to 2.0  
· history of hypertension · History of BPH · Anemia · History of aortic stenosis ·   
  
PLAN- 
· Continue IV fluids, changed to 1/2 NS 
· Urology consult appreciated. Rayo now , posisble cysto with stent if needed · Avoid hypotension · Avoid NSAIDs · UA had blood and will repeat it urine cx pending · Urine lytes ATN  
· Isat low and will start Iron · Daily BMP · Give Lasix with PRBC · GI consulted Interval History: 
Seen in am , cr trending down. Making more urine . IVF adjusted. Drop in hbg and for prbc today and GI consulted . Review of Systems: A comprehensive review of systems was negative except for that written in the HPI. Current Medications:  
Current Facility-Administered Medications Medication Dose Route Frequency  0.9% sodium chloride infusion 250 mL  250 mL IntraVENous PRN  pantoprazole (PROTONIX) 40 mg in 0.9% sodium chloride 10 mL injection  40 mg IntraVENous Q12H  furosemide (LASIX) injection 40 mg  40 mg IntraVENous ONCE  
 0.45% sodium chloride infusion  65 mL/hr IntraVENous CONTINUOUS  
 sodium chloride (NS) flush 5-40 mL  5-40 mL IntraVENous Q8H  
 sodium chloride (NS) flush 5-40 mL  5-40 mL IntraVENous PRN  
 acetaminophen (TYLENOL) tablet 650 mg  650 mg Oral Q6H PRN Or  
 acetaminophen (TYLENOL) suppository 650 mg  650 mg Rectal Q6H PRN  polyethylene glycol (MIRALAX) packet 17 g  17 g Oral DAILY PRN  
 ondansetron (ZOFRAN ODT) tablet 4 mg  4 mg Oral Q8H PRN  Or  
 ondansetron (ZOFRAN) injection 4 mg  4 mg IntraVENous Q6H PRN  
 [Held by provider] heparin (porcine) injection 5,000 Units  5,000 Units SubCUTAneous Q8H Allergies Allergen Reactions  Codeine Other (comments)  
   pt states that he became high with morphine--yrs ago 
 pt states that he became high with morphine--yrs ago  Simvastatin Myalgia and Other (comments) And weakness And weakness Objective: 
Vitals:   
Vitals:  
 11/04/21 0010 11/04/21 0741 11/04/21 0914 11/04/21 1102 BP: (!) 149/71 (!) 157/67  127/64 Pulse: 64 68  65 Resp: 16 18  16 Temp: 98 °F (36.7 °C) 97.5 °F (36.4 °C)  97.5 °F (36.4 °C) SpO2: 95% 93%  100% Weight:      
Height:   5' 11\" (1.803 m) Intake and Output: 
No intake/output data recorded. 11/02 1901 - 11/04 0700 In: 600 [I.V.:600] Out: 2400 [Urine:2400] Physical Examination: 
Pt intubated     No 
General: NAD,Conversant Neck:  Supple, no mass Resp:  Lungs CTA B/L, no wheezing , normal respiratory effort CV:  RRR,  no murmur or rub,noLE edema , pacemaker in chest  
GI:  Soft, NT, + Bowel sounds, no hepatosplenomegaly Neurologic:  Non focal 
Psych:             AAO x 3 appropriate affect Skin:  No Rash :  Has locke cath and clear urine [x]    High complexity decision making was performed 
[x]    Patient is at high-risk of decompensation with multiple organ involvement Lab Data Personally Reviewed: I have reviewed all the pertinent labs, microbiology data and radiology studies during assessment. Recent Labs 11/04/21 
0210 11/03/21 
8649 11/02/21 2015 *  145 143 141  
K 3.6  3.6 4.0 3.7 *  115* 112* 109* CO2 22  23 21 22 GLU 93  93 98 105* *  110* 124* 126* CREA 3.40*  3.41* 4.26* 5.05* CA 8.3*  8.6 8.3* 8.4* MG 2.4  --   --   
PHOS 3.8  3.8  --   --   
ALB 1.9*  1.8*  --  2.0* ALT 17  --  18 Recent Labs 11/04/21 0210 11/03/21 2075 11/02/21 2015 WBC 9.6 9.2 10.8 HGB 6.3* 7.3* 8.0*  
HCT 20.3* 23.6* 26.5*  250 255 No results found for: SDES Lab Results Component Value Date/Time Culture result: HEAVY ENTEROCOCCUS FAECALIS (A) 10/10/2021 08:47 AM  
 Culture result: RARE PROTEUS SPECIES (A) 10/10/2021 08:47 AM  
 Culture result: LIGHT CITROBACTER KOSERI (A) 10/10/2021 08:47 AM  
 Culture result: NO ANAEROBES ISOLATED 10/10/2021 08:47 AM  
 Culture result: MODERATE CITROBACTER KOSERI (A) 10/06/2021 12:52 PM  
 Culture result: MODERATE ALCALIGENES FAECALIS (A) 10/06/2021 12:52 PM  
 Culture result: MODERATE ENTEROCOCCUS FAECALIS (A) 10/06/2021 12:52 PM  
 Culture result: (A) 10/06/2021 12:52 PM  
  LIGHT ANAEROBIC GRAM NEGATIVE RODS BETA LACTAMASE NEGATIVE Recent Results (from the past 24 hour(s)) URINALYSIS W/ REFLEX CULTURE Collection Time: 11/03/21 12:05 PM  
 Specimen: Urine Result Value Ref Range Color YELLOW/STRAW Appearance TURBID (A) CLEAR Specific gravity 1.014 1.003 - 1.030    
 pH (UA) 5.0 5.0 - 8.0 Protein 100 (A) NEG mg/dL Glucose Negative NEG mg/dL Ketone Negative NEG mg/dL Bilirubin Negative NEG Blood LARGE (A) NEG Urobilinogen 0.2 0.2 - 1.0 EU/dL Nitrites Negative NEG Leukocyte Esterase LARGE (A) NEG    
 WBC >100 (H) 0 - 4 /hpf  
 RBC  0 - 5 /hpf Epithelial cells FEW FEW /lpf Bacteria Negative NEG /hpf  
 UA:UC IF INDICATED URINE CULTURE ORDERED (A) CNI Mucus TRACE (A) NEG /lpf Hyaline cast 0-2 0 - 5 /lpf Granular cast 0-2 (A) NEG /lpf Budding yeast PRESENT (A) NEG Yeast w/hyphae PRESENT (A) NEG    
SODIUM, UR, RANDOM Collection Time: 11/03/21 12:31 PM  
Result Value Ref Range Sodium,urine random 36 MMOL/L  
CREATININE, UR, RANDOM Collection Time: 11/03/21 12:31 PM  
Result Value Ref Range Creatinine, urine 58.70 mg/dL CHLORIDE, URINE RANDOM Collection Time: 11/03/21 12:31 PM  
Result Value Ref Range Chloride,urine random 23 MMOL/L  
IRON PROFILE  Collection Time: 11/04/21  2:09 AM  
Result Value Ref Range Iron 20 (L) 35 - 150 ug/dL TIBC 124 (L) 250 - 450 ug/dL Iron % saturation 16 (L) 20 - 50 % METABOLIC PANEL, COMPREHENSIVE Collection Time: 11/04/21  2:10 AM  
Result Value Ref Range Sodium 146 (H) 136 - 145 mmol/L Potassium 3.6 3.5 - 5.1 mmol/L Chloride 116 (H) 97 - 108 mmol/L  
 CO2 22 21 - 32 mmol/L Anion gap 8 5 - 15 mmol/L Glucose 93 65 - 100 mg/dL  (H) 6 - 20 MG/DL Creatinine 3.40 (H) 0.70 - 1.30 MG/DL  
 BUN/Creatinine ratio 32 (H) 12 - 20 GFR est AA 21 (L) >60 ml/min/1.73m2 GFR est non-AA 17 (L) >60 ml/min/1.73m2 Calcium 8.3 (L) 8.5 - 10.1 MG/DL Bilirubin, total 0.2 0.2 - 1.0 MG/DL  
 ALT (SGPT) 17 12 - 78 U/L  
 AST (SGOT) 19 15 - 37 U/L Alk. phosphatase 84 45 - 117 U/L Protein, total 5.4 (L) 6.4 - 8.2 g/dL Albumin 1.9 (L) 3.5 - 5.0 g/dL Globulin 3.5 2.0 - 4.0 g/dL A-G Ratio 0.5 (L) 1.1 - 2.2 MAGNESIUM Collection Time: 11/04/21  2:10 AM  
Result Value Ref Range Magnesium 2.4 1.6 - 2.4 mg/dL PHOSPHORUS Collection Time: 11/04/21  2:10 AM  
Result Value Ref Range Phosphorus 3.8 2.6 - 4.7 MG/DL  
CBC WITH AUTOMATED DIFF Collection Time: 11/04/21  2:10 AM  
Result Value Ref Range WBC 9.6 4.1 - 11.1 K/uL  
 RBC 2.13 (L) 4.10 - 5.70 M/uL HGB 6.3 (L) 12.1 - 17.0 g/dL HCT 20.3 (L) 36.6 - 50.3 % MCV 95.3 80.0 - 99.0 FL  
 MCH 29.6 26.0 - 34.0 PG  
 MCHC 31.0 30.0 - 36.5 g/dL  
 RDW 16.6 (H) 11.5 - 14.5 % PLATELET 225 625 - 019 K/uL MPV 10.7 8.9 - 12.9 FL  
 NRBC 0.0 0  WBC ABSOLUTE NRBC 0.00 0.00 - 0.01 K/uL NEUTROPHILS 83 (H) 32 - 75 % LYMPHOCYTES 7 (L) 12 - 49 % MONOCYTES 7 5 - 13 % EOSINOPHILS 2 0 - 7 % BASOPHILS 0 0 - 1 % IMMATURE GRANULOCYTES 1 (H) 0.0 - 0.5 % ABS. NEUTROPHILS 7.9 1.8 - 8.0 K/UL  
 ABS. LYMPHOCYTES 0.7 (L) 0.8 - 3.5 K/UL  
 ABS. MONOCYTES 0.7 0.0 - 1.0 K/UL  
 ABS. EOSINOPHILS 0.2 0.0 - 0.4 K/UL  
 ABS.  BASOPHILS 0.0 0.0 - 0.1 K/UL  
 ABS. IMM. GRANS. 0.1 (H) 0.00 - 0.04 K/UL  
 DF SMEAR SCANNED    
 RBC COMMENTS ANISOCYTOSIS 1+ FERRITIN Collection Time: 11/04/21  2:10 AM  
Result Value Ref Range Ferritin 125 26 - 388 NG/ML  
RENAL FUNCTION PANEL Collection Time: 11/04/21  2:10 AM  
Result Value Ref Range Sodium 145 136 - 145 mmol/L Potassium 3.6 3.5 - 5.1 mmol/L Chloride 115 (H) 97 - 108 mmol/L  
 CO2 23 21 - 32 mmol/L Anion gap 7 5 - 15 mmol/L Glucose 93 65 - 100 mg/dL  (H) 6 - 20 MG/DL Creatinine 3.41 (H) 0.70 - 1.30 MG/DL  
 BUN/Creatinine ratio 32 (H) 12 - 20 GFR est AA 21 (L) >60 ml/min/1.73m2 GFR est non-AA 17 (L) >60 ml/min/1.73m2 Calcium 8.6 8.5 - 10.1 MG/DL Phosphorus 3.8 2.6 - 4.7 MG/DL Albumin 1.8 (L) 3.5 - 5.0 g/dL SAMPLES BEING HELD Collection Time: 11/04/21  2:10 AM  
Result Value Ref Range SAMPLES BEING HELD SST LV   
 COMMENT Add-on orders for these samples will be processed based on acceptable specimen integrity and analyte stability, which may vary by analyte. RBC, ALLOCATE Collection Time: 11/04/21  9:15 AM  
Result Value Ref Range HISTORY CHECKED? Historical check performed TYPE & SCREEN Collection Time: 11/04/21  9:45 AM  
Result Value Ref Range Crossmatch Expiration 11/07/2021,2359 ABO/Rh(D) PENDING Antibody screen PENDING Total time spent with patient:  25  min. Care Plan discussed with: 
Patient Family RN Consulting Physician [address] [facility],      
 
I have reviewed the flowsheets. Chart and Pertinent Notes have been reviewed. No change in PMH ,family and social history from Consult note.  
 
 
True Singleton MD

## 2021-11-05 NOTE — PROGRESS NOTES
Endoscope was pre-cleaned at the bedside immediately following procedure by Raul Molina. For medications administered by anesthesia, see anesthesia chart.

## 2021-11-05 NOTE — ANESTHESIA PREPROCEDURE EVALUATION
Relevant Problems CARDIOVASCULAR  
(+) CAD (coronary artery disease), native coronary artery  
(+) SSS (sick sinus syndrome) ([facility] [address] RENAL FAILURE  
(+) ARF (acute renal failure) ([facility] [address] Anesthetic History No history of anesthetic complications Review of Systems / Medical History Patient summary reviewed, nursing notes reviewed and pertinent labs reviewed Pulmonary Within defined limits Neuro/Psych Within defined limits Cardiovascular Hypertension: well controlled Dysrhythmias : atrial flutter Pacemaker (SSS) and CAD Exercise tolerance: >4 METS 
  
GI/Hepatic/Renal 
  
GERD Liver disease Endo/Other Within defined limits Other Findings Comments: [facility] CAD (coronary artery disease), native coronary artery Raynaud's disease H/O aortic valve stenosis Creatinine elevation Pacemaker Valvular heart disease Hypertension Liver disease Chronic kidney disease (CKD) stage G3b/A2, Physical Exam 
 
Airway Mallampati: II 
TM Distance: 4 - 6 cm Neck ROM: normal range of motion Mouth opening: Normal 
 
 Cardiovascular Regular rate and rhythm,  S1 and S2 normal,  no murmur, click, rub, or gallop Rhythm: regular Rate: normal 
 
 
 
 Dental 
No notable dental hx Pulmonary Breath sounds clear to auscultation Abdominal 
GI exam deferred Other Findings Anesthetic Plan ASA: 3 Anesthesia type: MAC Induction: Intravenous Anesthetic plan and risks discussed with: Patient

## 2021-11-05 NOTE — PROGRESS NOTES
Progress Note    Patient: Ar Martínez MRN: [id number]  SSN: [id number]    YOB: 1933  Age: 80 y.o. Sex: male        ADMITTED:  2021 to Adama Pugh MD  for ARF (acute renal failure) [facility]) [N17.9]         Ar Martínez was admitted for ARF (acute renal failure) ([address] [N17.9]. Urology following for ARF (creat 5.05 on admission and baseline known to be around 2) with CT abd showing hydronephrosis and bladder distention. Locke exchanged in ED with clear urine, good UOP. Bladder scans have since revealed to be empty. Patient going to EGD today. Chart reviewed:  Af,vss  Good UOP from locke   Wbc wnl  hgb up 7.8, x1 PRBC, occult stool pending, reports of tarry stools  UA likely colonized due to prolonged locke, culture in process. Notably UA with  rbc. NO gross hematuria. Creat 5 on admission, now 2.7 (baseline 2)  CT abd results reviewed    Vitals:  Temp (24hrs), Av.5 °F (36.4 °C), Min:97.4 °F (36.3 °C), Max:97.6 °F (36.4 °C)     Blood pressure (!) 86/52, pulse 65, temperature 97.4 °F (36.3 °C), resp. rate 16, height 5' 11\" (1.803 m), weight 64.9 kg (143 lb), SpO2 99 %. I&O's:  1901 - 700  In: 822.5 [I.V.:600]  Out: 1502 [Urine:1500]   701 - 1900  In: 780 [I.V.:780]  Out: -      Exam:  General: alert, not oriented   NAD. abdomen soft, NT  : locke draining clear yellow UA  Weak and frail      Labs:   Recent Labs     21  0252 21  0210 21  0621 21   WBC  --  9.6 9.2  --  10.8   HGB 7.8* 6.3* 7.3*   < > 8.0*   HCT 24.0* 20.3* 23.6*   < > 26.5*   PLT  --  247 250  --  255    < > = values in this interval not displayed.      Recent Labs     21  0252 21  0210 21  0621    146*  145 143   K 3.3* 3.6  3.6 4.0   * 116*  115* 112*   CO2 22 22  23 21    93  93 98   BUN 93* 109*  110* 124*   CREA 2.76* 3.40*  3.41* 4.26*   CA 8.4* 8.3* 8.6 8.3*        Cultures:      Imaging:       Assessment:     - Active Problems:    ARF (acute renal failure) ([facility] [address] (11/2/2021)    Nickolas hydronephrosis L>R with FAM and Urinary retention now with locke with good UOP. Hydro resolved on MARYAM     BPH hx    Renal cyst    Anemia- 6.3, pending occult stool. GI consulted    Microhematuria- OP cysto    YANCY-improving, nephrology following.  Baseline creat 2    Plan:     -hydro resolved on MARYAM,   -keep locke through discharge, will arrange outpatient follow up for voiding trail and cystoscopy   -no further urologic intervention, will sign off, please call if needed    Supervising MD, Dr. Nelson Menendez  Signed By: Ilda Robles NP - November 5, 2021

## 2021-11-05 NOTE — PROGRESS NOTES
Received message from patient's nurse stating: 
 
Patient states his feet hurt where his toes were amputated. He only has Tylenol ordered I have given him a dose but he said his pain is severe and the Tylenol did not help him at all. Discussion / orders: 
 
Patient is currently n.p.o. which started after midnight · Morphine 1 mg IV x1 Please note that this note was dictated using Dragon computer voice recognition software. Quite often unanticipated grammatical, syntax, homophones, and other interpretive errors are inadvertently transcribed by the computer software. Please disregard these errors. Please excuse any errors that have escaped final proofreading.

## 2021-11-05 NOTE — PROGRESS NOTES
Problem: Self Care Deficits Care Plan (Adult)  Goal: *Acute Goals and Plan of Care (Insert Text)  Description:   FUNCTIONAL STATUS PRIOR TO ADMISSION: Pt poor historian, with difficulty staying aware during evaluation; therefore, PLOF taken from chart, indicating pt was Mod Independent with ADLs at [facility], with noted instability and pt refusing to use RW. 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 HOME SUPPORT: The patient lived with wife to provide PRN ADL/IADL assist.    Occupational Therapy Goals  Initiated 11/5/2021  1. Patient will perform seated grooming with supervision/set-up within 7 day(s). 2.  Patient will perform seated upper body dressing with supervision/set-up within 7 day(s). 3.  Patient will perform EOB/RW lower body dressing with moderate assistance within 7 day(s). 4.  Patient will perform RW toilet transfers with moderate assistance within 7 day(s). 5.  Patient will perform all aspects of RW toileting with moderate assistance within 7 day(s). Outcome: Not Met    OCCUPATIONAL THERAPY EVALUATION  Patient: Deborah Gaxiola (11 y.o. male)  Date: 11/5/2021  Primary Diagnosis: ARF (acute renal failure) ([facility]) [N17.9]  Procedure(s) (LRB):  ESOPHAGOGASTRODUODENOSCOPY (EGD) (N/A) Day of Surgery   Precautions:   NWB (RLE NWB- transmetarsal amputation 10/10/21)    ASSESSMENT  Based on the objective data described below, the patient presents with decreased problem solving/sequencing/safety/task initiation, difficulty staying awake, decreased strength/endurance, decreased mobility/balance, decreased activity tolerance and c/o pain in B feet, all of which limit pt's ability to complete self-care routine at level congruent with PLOF. Secondary to pt's lethargy, therapist unable to assist pt to EOB, with pt benefiting from Total A for bed level toileting and Total Ax2 for lateral transfer from hospital bed to transport gurney.   Currently, pt benefits from [address] [place] [address] for inclined self-feeding and UB dressing, Mod A for grooming, Max A for bathing and Total A for toileting and LE dressing. Pt continues to be NWB to RLE secondary to RLE transmetatarsal amputation on 10/10/21. Pt benefits from skilled OT to address functional deficits during acute hospitalization, with reporting therapist believing pt would benefit from SNF rehab upon discharge. Current Level of Function Impacting Discharge (ADLs/self-care): Min A for inclined self-feeding and UB dressing, Mod A for grooming, Max A for bathing and Total A for toileting and LE dressing    Functional Outcome Measure: The patient scored 5/100 on the Barthel Index outcome measure. Other factors to consider for discharge: lethargy, B foot pain, RLE NWB     Patient will benefit from skilled therapy intervention to address the above noted impairments. PLAN :  Recommendations and Planned Interventions: self care training, functional mobility training, therapeutic exercise, balance training, therapeutic activities, endurance activities and patient education    Frequency/Duration: Patient will be followed by occupational therapy 4 times a week to address goals. Recommendation for discharge: (in order for the patient to meet his/her long term goals)  Therapy up to 5 days/week in SNF setting    This discharge recommendation:  Has been made in collaboration with the attending provider and/or case management    IF patient discharges home will need the following DME: TBD       SUBJECTIVE:   Patient stated I'm tired, my foot hurts.     OBJECTIVE DATA SUMMARY:   HISTORY:   Past Medical History:   Diagnosis Date    CAD (coronary artery disease), native coronary artery      RCA    Chronic kidney disease (CKD) stage G3b/A2, moderately decreased glomerular filtration rate (GFR) between 30-44 mL/min/1.73 square meter and albuminuria creatinine ratio between  mg/g ([facility])     Creatinine elevation 2020    H/O aortic valve stenosis post valve replacement    Hypertension     Liver disease     Hepatits A in 1980s    Pacemaker     Raynaud's disease     SSS (sick sinus syndrome) (HCC)     pacemaker    Valvular heart disease      Past Surgical History:   Procedure Laterality Date    HX AORTIC VALVE REPLACEMENT      HX PACEMAKER      VASCULAR SURGERY PROCEDURE UNLIST  03/2021    to legs       Expanded or extensive additional review of patient history:     Home Situation  Home Environment: Private residence  # Steps to Enter: 3  Rails to Enter: Yes  Hand Rails : Right  One/Two Story Residence: One story  Living Alone: No  Support Systems: Spouse/Significant Other  Patient Expects to be Discharged to[de-identified] Rehabilitation facility  Current DME Used/Available at Home: Cane, straight, Walker, rolling, Grab bars  Tub or Shower Type: Shower (seated surface)    Hand dominance: Right    EXAMINATION OF PERFORMANCE DEFICITS:  Cognitive/Behavioral Status:  Neurologic State: Alert; Lethargic  Orientation Level: Oriented X4 (with periodic confusion)  Cognition: Decreased attention/concentration; Decreased command following  Perseveration: No perseveration noted  Safety/Judgement: Decreased awareness of environment; Decreased awareness of need for assistance; Decreased awareness of need for safety; Decreased insight into deficits    Hearing: Auditory  Auditory Impairment: None    Vision/Perceptual:                                Corrective Lenses: Glasses    Range of Motion:  AROM: Generally decreased, functional (BUE)  PROM: Generally decreased, functional (BUE)                      Strength:  Strength: Generally decreased, functional (BUE)                Coordination:  Coordination: Generally decreased, functional (BUE)  Fine Motor Skills-Upper: Left Intact; Right Intact    Gross Motor Skills-Upper: Left Intact;  Right Intact    Tone & Sensation:  Tone: Normal  Sensation: Impaired (BLE hypersensitivity)                      Functional Mobility and Transfers for ADLs:  Bed Mobility:  Rolling: Minimum assistance    Transfers:  Bed to Chair: Total assistance; Assist x2 (lateral transfer from inclined to transport gurney)    ADL Assessment:  Feeding: Minimum assistance    Oral Facial Hygiene/Grooming: Moderate assistance    Bathing: Maximum assistance    Upper Body Dressing: Minimum assistance    Lower Body Dressing: Total assistance    Toileting: Total assistance    ADL Intervention and task modifications:  Cognitive Retraining  Safety/Judgement: Decreased awareness of environment; Decreased awareness of need for assistance; Decreased awareness of need for safety; Decreased insight into deficits     Functional Measure:    Barthel Index:  Bathin  Bladder: 0  Bowels: 0  Groomin  Dressin  Feedin  Mobility: 0  Stairs: 0  Toilet Use: 0  Transfer (Bed to Chair and Back): 0  Total: 5/100      The Barthel ADL Index: Guidelines  1. The index should be used as a record of what a patient does, not as a record of what a patient could do. 2. The main aim is to establish degree of independence from any help, physical or verbal, however minor and for whatever reason. 3. The need for supervision renders the patient not independent. 4. A patient's performance should be established using the best available evidence. Asking the patient, friends/relatives and nurses are the usual sources, but direct observation and common sense are also important. However direct testing is not needed. 5. Usually the patient's performance over the preceding 24-48 hours is important, but occasionally longer periods will be relevant. 6. Middle categories imply that the patient supplies over 50 per cent of the effort. 7. Use of aids to be independent is allowed. Score Interpretation (from [address] [place] [address])    Independent   60-79 Minimally independent   40-59 Partially dependent   20-39 Very dependent   <20 Totally dependent     -Michele Moe., Barthel, D.W. (1965).  Functional evaluation: the Barthel Index. [facility] ([address] (1997). The Barthel activities of daily living index: self-reporting versus actual performance in the old (> or = 75 years). Journal of [address] [place] [address] 45(7), [facility], DORIAN, Natalie Franco., Marla Hart. (1999). Measuring the change in disability after inpatient rehabilitation; comparison of the responsiveness of the Barthel Index and Functional Hagerman Measure. Journal of Neurology, Neurosurgery, and Psychiatry, 66(4), 484-866. JASSI Quan, AMPARO Son, & Beth Willard M.A. (2004) Assessment of post-stroke quality of life in cost-effectiveness studies: The usefulness of the Barthel Index and the EuroQoL-5D. Quality of Life Research, 15, 923-28       Occupational Therapy Evaluation Charge Determination   History Examination Decision-Making   LOW Complexity : Brief history review  HIGH Complexity : 5 or more performance deficits relating to physical, cognitive , or psychosocial skils that result in activity limitations and / or participation restrictions LOW Complexity : No comorbidities that affect functional and no verbal or physical assistance needed to complete eval tasks       Based on the above components, the patient evaluation is determined to be of the following complexity level: LOW   Pain Rating:  High c/o pain in B feet, did not quantify; nursing aware and following    Activity Tolerance:   Poor and requires frequent rest breaks    After treatment patient left in no apparent distress:    supine on transport [place] with RN following    COMMUNICATION/EDUCATION:   The patients plan of care was discussed with: Physical therapist, Registered nurse and Case management. Home safety education was provided and the patient/caregiver indicated understanding., Patient/family have participated as able in goal setting and plan of care.  and Patient/family agree to work toward stated goals and plan of care. This patients plan of care is appropriate for delegation to SULAIMAN.     Thank you for this referral.  Alejandro Rico OT  Time Calculation: 17 mins

## 2021-11-05 NOTE — PROCEDURES
NAME:  Clement Wilcox   :   1933   MRN:   [id number]     Date/Time:  2021 9:56 AM    Esophagogastroduodenoscopy (EGD) Procedure Note    Procedure: Esophagogastroduodenoscopy with biopsy    Indication: anemia, melena  Pre-operative Diagnosis: see indication above  Post-operative Diagnosis: see findings below  :  Theodore Norris MD  Referring Provider:   RYLIE Solomon    Exam:  Airway: clear, no airway problems anticipated  Heart: RRR, without gallops or rubs  Lungs: clear bilaterally without wheezes, crackles, or rhonchi  Abdomen: soft, nontender, nondistended, bowel sounds present  Mental Status: awake, alert and oriented to person, place and time     Anethesia/Sedation:  MAC anesthesia Propofol  Procedure Details   After informed consent was obtained for the procedure, with all risks and benefits of procedure explained the patient was taken to the endoscopy suite and placed in the left lateral decubitus position. Following sequential administration of sedation as per above, the PFII306 gastroscope was inserted into the mouth and advanced under direct vision to third portion of the duodenum. A careful inspection was made as the gastroscope was withdrawn, including a retroflexed view of the proximal stomach; findings and interventions are described below. Findings:   OROPHARYNX: Cords and pyriform recesses normal.   ESOPHAGUS: candida esophagitis, diffusely  STOMACH: The fundus on antegrade and retroflex views is normal. The body, antrum, and pylorus have diffuse erythema and focal erosions, biopsied for h.pylori rule out. DUODENUM:   -- large, 2.0 cm, edematous edged ulcer, without active bleeding    Therapies:   biopsy of stomach body, antrum    Specimens: gastric    EBL:  None. Complications:   None; patient tolerated the procedure well.            Impression:  -- large duodenal ulcer, the likely source of recent anemia  -- candida esophagitis  -- moderate gastritis with erosions, biopsied to rule out h.pylori    Recommendations:  -- await pathology  -- nystatin liquid, ordered  -- resume diet  -- PPI BID x 8 weeks  -- add sucralfate QID    Discharge disposition:  Back to wards    Noemy Mendoza MD

## 2021-11-05 NOTE — PROGRESS NOTES
Problem: Mobility Impaired (Adult and Pediatric)  Goal: *Acute Goals and Plan of Care (Insert Text)  Description: FUNCTIONAL STATUS PRIOR TO ADMISSION: Pt was at a SNF for rehab following hospitalization early October. Prior to that patient was modified independent using a single point cane for functional mobility. Wife reports that he was very unsteady but refused to use the RW at home. HOME SUPPORT PRIOR TO ADMISSION: The patient lived with wife but did not require assist prior to his SNF admission after his last hospitalization    Physical Therapy Goals  Initiated 11/5/2021  1. Patient will move from supine to sit and sit to supine  in bed with minimal assist within 7 day(s). 2.  Patient will transfer from bed to chair and chair to bed with minimal assist using the least restrictive device within 7 day(s). 3.  Patient will perform sit to stand with minimal assist within 7 day(s). 4.  Patient will ambulate with minimal assistance for 10 feet with the least restrictive device within 7 day(s). Outcome: Not Progressing Towards Goal   PHYSICAL THERAPY EVALUATION  Patient: Christi Valencia (31 y.o. 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 Pt received in bed and with limited participation in PT eval due to his lethargy. He was able to roll with minimal assistance, but unable to transition to sitting eob. Transport arrived during session and pt required total a x 2 to slide from hospital bed to [facility].   Pt admitted from SNF rehab, where he went after his last hospitalization. He was NWB(RLE secondary to a TMA on 10/10/21) during that hospitalization and remains NWB at this time. PT will follow pt in the acute setting and recommend her return to SNF rehab at discharge. Current Level of Function Impacting Discharge (mobility/balance): Min A for rolling, more function to be assessed     Functional Outcome Measure: The patient scored 5/100 on the Barthel Index outcome measure. Other factors to consider for discharge: lethargy, B foot pain, RLE NWB     Patient will benefit from skilled therapy intervention to address the above noted impairments. PLAN :  Recommendations and Planned Interventions: bed mobility training, transfer training, gait training, therapeutic exercises, patient and family training/education, and therapeutic activities      Frequency/Duration: Patient will be followed by physical therapy:  4 times a week to address goals. Recommendation for discharge: (in order for the patient to meet his/her long term goals)  Therapy up to 5 days/week in SNF setting    This discharge recommendation:  Has been made in collaboration with the attending provider and/or case management    IF patient discharges home will need the following DME: to be determined (TBD)         SUBJECTIVE:   Patient stated my legs hurt.     OBJECTIVE DATA SUMMARY:   HISTORY:    Past Medical History:   Diagnosis Date    CAD (coronary artery disease), native coronary artery      RCA    Chronic kidney disease (CKD) stage G3b/A2, moderately decreased glomerular filtration rate (GFR) between 30-44 mL/min/1.73 square meter and albuminuria creatinine ratio between  mg/g (HCC)     Creatinine elevation 2020    H/O aortic valve stenosis     post valve replacement    Hypertension     Liver disease     Hepatits A in 1980s    Pacemaker     Raynaud's disease     SSS (sick sinus syndrome) ([facility] [address]     pacemaker    Valvular heart disease      Past Surgical History:   Procedure Laterality Date    HX AORTIC VALVE REPLACEMENT      HX PACEMAKER      UPPER GI ENDOSCOPY,BIOPSY  2021         VASCULAR SURGERY PROCEDURE UNLIST  2021    to legs       Personal factors and/or comorbidities impacting plan of care: lethargy, BLE/foot pain, RLE NWB    Home Situation  Home Environment: Private residence  # Steps to Enter: 3  Rails to Enter: Yes  Hand Rails : Right  One/Two Story Residence: One story  Living Alone: No  Support Systems: Spouse/Significant Other  Patient Expects to be Discharged to[de-identified] Rehabilitation facility  Current DME Used/Available at Home: [employer] [place], straight, Walker, rolling, Grab bars  Tub or Shower Type: Shower (seated surface)    EXAMINATION/PRESENTATION/DECISION MAKING:   Critical Behavior:  Neurologic State: Alert, Lethargic  Orientation Level: Oriented X4  Cognition: Decreased attention/concentration  Safety/Judgement: Decreased awareness of environment, Decreased awareness of need for assistance, Decreased awareness of need for safety, Decreased insight into deficits  Hearing:   Auditory  Auditory Impairment: None  Range Of Motion:  AROM: Generally decreased, functional (BUE)           PROM: Generally decreased, functional (BUE)           Strength:    Strength: Generally decreased, functional (BUE)                    Tone & Sensation:   Tone: Normal              Sensation: Impaired (BLE hypersensitivity)               Coordination:  Coordination: Generally decreased, functional (BUE)  Vision:   Corrective Lenses: Glasses  Functional Mobility:  Bed Mobility:  Rolling: Minimum assistance           Transfers:              Bed to Chair: Total assistance; Assist x2 (lateral transfer from inclined to transport gurney)          Functional Measure:  Barthel Index:    Bathin  Bladder: 0  Bowels: 0  Groomin  Dressin  Feedin  Mobility: 0  Stairs: 0  Toilet Use: 0  Transfer (Bed to Chair and Back): 0  Total: 5/100       The Barthel ADL Index: Guidelines  1. The index should be used as a record of what a patient does, not as a record of what a patient could do. 2. The main aim is to establish degree of independence from any help, physical or verbal, however minor and for whatever reason. 3. The need for supervision renders the patient not independent. 4. A patient's performance should be established using the best available evidence. Asking the patient, friends/relatives and nurses are the usual sources, but direct observation and common sense are also important. However direct testing is not needed. 5. Usually the patient's performance over the preceding 24-48 hours is important, but occasionally longer periods will be relevant. 6. Middle categories imply that the patient supplies over 50 per cent of the effort. 7. Use of aids to be independent is allowed. Sabra Palumbo., Barthel, D.W. (5933). Functional evaluation: the Barthel Index. [facility] (14)2. Pricila Crawford conchita DORIAN Linton, Natalie Franco., Darien Heredia., [place], [address] [place] [address] (1999). Measuring the change indisability after inpatient rehabilitation; comparison of the responsiveness of the Barthel Index and Functional Reynolds Measure. Journal of Neurology, Neurosurgery, and Psychiatry, 66(4), 739-949. Viji Dumont, N.J.A, MEG SonJ.KEN, & Beth Willard MKAMLESH. (2004.) Assessment of post-stroke quality of life in cost-effectiveness studies: The usefulness of the Barthel Index and the EuroQoL-5D. Quality of Life Research, 13, 427-40      Pain Rating:  BLE/foot pain, not quantified    Activity Tolerance:   Poor due to pain and lethargy    After treatment patient left in no apparent distress: On gurney preparing to be transported     COMMUNICATION/EDUCATION:   The patients plan of care was discussed with: Occupational therapist, Registered nurse, and Case management.      Fall prevention education was provided and the patient/caregiver indicated understanding., Patient/family have participated as able in goal setting and plan of care. , and Patient/family agree to work toward stated goals and plan of care.     Thank you for this referral.  Daryle Shelter, PT   Time Calculation: 22 mins

## 2021-11-05 NOTE — PROGRESS NOTES
Received message from patient's nurse stating: 
 
I gave patient Morphine at [facility], he rested for about an hour and has been up moaning, he says his right foot hurts too bad. Discussion / orders: 
 
Dilaudid 1 mg iv x 1 Please note that this note was dictated using Dragon computer voice recognition software. Quite often unanticipated grammatical, syntax, homophones, and other interpretive errors are inadvertently transcribed by the computer software. Please disregard these errors. Please excuse any errors that have escaped final proofreading.

## 2021-11-05 NOTE — PROGRESS NOTES
Order obtained for  1 mg Dilaudid, given Patient's oxygen saturation 89, started on oxygen at 2 LPM via nasal cannula, sat hanging around 95%, will continue to monitor. [id number] Patient is lying quietly in bed asleep.

## 2021-11-05 NOTE — PROGRESS NOTES
Dentures in pink denture cup with patient label on, Zhanna Frank transporter in possession of them upon departure from Endo recovery.

## 2021-11-05 NOTE — H&P
Date of Surgery Update: 
Areli Lester was seen and examined. History and physical has been reviewed. The patient has been examined.  There have been no significant clinical changes since the completion of the originally dated History and Physical. 
 
Signed By: Nazanin Martinez MD   
 November 5, 2021 9:16 AM

## 2021-11-05 NOTE — ANESTHESIA POSTPROCEDURE EVALUATION
Procedure(s): ESOPHAGOGASTRODUODENOSCOPY (EGD) ESOPHAGOGASTRODUODENAL (EGD) BIOPSY. total IV anesthesia Anesthesia Post Evaluation Patient location during evaluation: PACU Note status: Adequate. Level of consciousness: responsive to verbal stimuli and sleepy but conscious Pain management: satisfactory to patient Airway patency: patent Anesthetic complications: no 
Cardiovascular status: acceptable Respiratory status: acceptable Hydration status: acceptable Comments: +Post-Anesthesia Evaluation and Assessment Patient: Jennifer Ely MRN: [id number]  SSN: [id number] YOB: 1933  Age: 80 y.o. Sex: male Cardiovascular Function/Vital Signs /85   Pulse 65   Temp 36.7 °C (98.1 °F)   Resp 14   Ht 5' 11\" (1.803 m)   Wt 64.9 kg (143 lb)   SpO2 96%   BMI 19.94 kg/m² Patient is status post Procedure(s): ESOPHAGOGASTRODUODENOSCOPY (EGD) ESOPHAGOGASTRODUODENAL (EGD) BIOPSY. Nausea/Vomiting: Controlled. Postoperative hydration reviewed and adequate. Pain: 
Pain Scale 1: Numeric (0 - 10) (11/05/21 1018) Pain Intensity 1: 0 (11/05/21 1018) Managed. Neurological Status: At baseline. Mental Status and Level of Consciousness: Arousable. Pulmonary Status:  
O2 Device: Nasal cannula (11/05/21 1018) Adequate oxygenation and airway patent. Complications related to anesthesia: None Post-anesthesia assessment completed. No concerns. Signed By: Sarkis Mcneal DO  
 11/5/2021 Post anesthesia nausea and vomiting:  controlled INITIAL Post-op Vital signs:  
Vitals Value Taken Time /85 11/05/21 1226 Temp 36.7 °C (98.1 °F) 11/05/21 1015 Pulse 65 11/05/21 1226 Resp 14 11/05/21 1226 SpO2 96 % 11/05/21 1226

## 2021-11-05 NOTE — PROGRESS NOTES
[facility]   [address] [address], [facility] [address], [facility]  Phone: [phone]   [phone]       Nephrology Progress Note  Zainab Mcknight     1/6/1933     [id number]  Date of Admission : 11/2/2021 11/05/21    CC: Follow up for Tabby    Assessment and Plan   · Acute kidney injury likely due to post renal obstruction  · Bilateral hydronephrosis left > right   · CKD stage IIIb baseline creatinine close to 2.0   · history of hypertension  · History of BPH  · Anemia  · History of aortic stenosis  ·       PLAN-  · Continue IV fluids,1/2 NS  · Urology consult appreciated. Rayo now , posisble cysto with stent if needed   · Avoid hypotension  · Avoid NSAIDs  · UA had blood and will repeat it urine cx pending   · Urine lytes ATN   · Isat low and will start Iron   · Daily BMP  · Give Lasix with PRBC   · GI consulted      Interval History:  Seen in am , cr trending down. Making more urine . IS/p Egd today and  Ulcer noted    Review of Systems: A comprehensive review of systems was negative except for that written in the HPI.     Current Medications:   Current Facility-Administered Medications   Medication Dose Route Frequency    0.9% sodium chloride infusion  25 mL/hr IntraVENous CONTINUOUS    sodium chloride (NS) flush 5-40 mL  5-40 mL IntraVENous Q8H    sodium chloride (NS) flush 5-40 mL  5-40 mL IntraVENous PRN    sucralfate (CARAFATE) tablet 1 g  1 g Oral AC&HS    nystatin (MYCOSTATIN) 100,000 unit/mL oral suspension 500,000 Units  500,000 Units Oral QID    0.9% sodium chloride infusion 250 mL  250 mL IntraVENous PRN    pantoprazole (PROTONIX) 40 mg in 0.9% sodium chloride 10 mL injection  40 mg IntraVENous Q12H    0.45% sodium chloride infusion  65 mL/hr IntraVENous CONTINUOUS    polyethylene glycol (MIRALAX) packet 17 g  17 g Oral BID    sodium chloride (NS) flush 5-40 mL  5-40 mL IntraVENous Q8H    sodium chloride (NS) flush 5-40 mL  5-40 mL IntraVENous PRN    acetaminophen (TYLENOL) tablet 650 mg  650 mg Oral Q6H PRN    Or    acetaminophen (TYLENOL) suppository 650 mg  650 mg Rectal Q6H PRN    polyethylene glycol (MIRALAX) packet 17 g  17 g Oral DAILY PRN    ondansetron (ZOFRAN ODT) tablet 4 mg  4 mg Oral Q8H PRN    Or    ondansetron (ZOFRAN) injection 4 mg  4 mg IntraVENous Q6H PRN    [Held by provider] heparin (porcine) injection 5,000 Units  5,000 Units SubCUTAneous Q8H      Allergies   Allergen Reactions    Morphine Other (comments)    Codeine Other (comments)      pt states that he became high with morphine--yrs ago   pt states that he became high with morphine--yrs ago      Simvastatin Myalgia and Other (comments)     And weakness  And weakness         Objective:  Vitals:    Vitals:    11/05/21 1018 11/05/21 1019 11/05/21 1038 11/05/21 1226   BP: (!) 140/54  120/73 116/85   Pulse: 65 65 65 65   Resp: 15 12 14 14   Temp:       SpO2: 100%  96% 96%   Weight:       Height:         Intake and Output:  11/05 0701 - 11/05 1900  In: 1180 [I.V.:1180]  Out: -   11/03 1901 - 11/05 0700  In: 822.5 [I.V.:600]  Out: 1502 [Urine:1500]    Physical Examination:  Pt intubated     No  General: NAD,Conversant   Neck:  Supple, no mass  Resp:  Lungs CTA B/L, no wheezing , normal respiratory effort  CV:  RRR,  no murmur or rub,noLE edema , pacemaker in chest   GI:  Soft, NT, + Bowel sounds, no hepatosplenomegaly  Neurologic:  Non focal  Psych:             AAO x 3 appropriate affect  Skin:  No Rash  :  Has locke cath and clear urine     [x]    High complexity decision making was performed  [x]    Patient is at high-risk of decompensation with multiple organ involvement    Lab Data Personally Reviewed: I have reviewed all the pertinent labs, microbiology data and radiology studies during assessment.     Recent Labs     11/05/21  0252 11/04/21  0210 11/03/21  0621 11/02/21 2015    146*  145 143 141   K 3.3* 3.6  3.6 4.0 3.7   * 116*  115* 112* 109*   CO2 22 22  23 21 22    93  93 98 105*   BUN 93* 109*  110* 124* 126*   CREA 2.76* 3.40*  3.41* 4.26* 5.05*   CA 8.4* 8.3*  8.6 8.3* 8.4*   MG  --  2.4  --   --    PHOS 2.8 3.8  3.8  --   --    ALB 1.7* 1.9*  1.8*  --  2.0*   ALT  --  17  --  18     Recent Labs     11/05/21  0252 11/04/21 0210 11/03/21  0621 11/02/21 2015   WBC  --  9.6 9.2 10.8   HGB 7.8* 6.3* 7.3* 8.0*   HCT 24.0* 20.3* 23.6* 26.5*   PLT  --  247 250 255     No results found for: SDES  Lab Results   Component Value Date/Time    Culture result: CANDIDA ALBICANS (A) 11/03/2021 12:05 PM    Culture result: HEAVY ENTEROCOCCUS FAECALIS (A) 10/10/2021 08:47 AM    Culture result: RARE PROTEUS SPECIES (A) 10/10/2021 08:47 AM    Culture result: LIGHT CITROBACTER KOSERI (A) 10/10/2021 08:47 AM    Culture result: NO ANAEROBES ISOLATED 10/10/2021 08:47 AM     Recent Results (from the past 24 hour(s))   SAMPLES BEING HELD    Collection Time: 11/05/21  2:51 AM   Result Value Ref Range    SAMPLES BEING HELD lav     COMMENT        Add-on orders for these samples will be processed based on acceptable specimen integrity and analyte stability, which may vary by analyte.    RENAL FUNCTION PANEL    Collection Time: 11/05/21  2:52 AM   Result Value Ref Range    Sodium 144 136 - 145 mmol/L    Potassium 3.3 (L) 3.5 - 5.1 mmol/L    Chloride 114 (H) 97 - 108 mmol/L    CO2 22 21 - 32 mmol/L    Anion gap 8 5 - 15 mmol/L    Glucose 100 65 - 100 mg/dL    BUN 93 (H) 6 - 20 MG/DL    Creatinine 2.76 (H) 0.70 - 1.30 MG/DL    BUN/Creatinine ratio 34 (H) 12 - 20      GFR est AA 26 (L) >60 ml/min/1.73m2    GFR est non-AA 22 (L) >60 ml/min/1.73m2    Calcium 8.4 (L) 8.5 - 10.1 MG/DL    Phosphorus 2.8 2.6 - 4.7 MG/DL    Albumin 1.7 (L) 3.5 - 5.0 g/dL   HGB & HCT    Collection Time: 11/05/21  2:52 AM   Result Value Ref Range    HGB 7.8 (L) 12.1 - 17.0 g/dL    HCT 24.0 (L) 36.6 - 50.3 %   COVID-19 RAPID TEST    Collection Time: 11/05/21  7:55 AM   Result Value Ref Range    Specimen source Nasopharyngeal COVID-19 rapid test Not detected NOTD             Total time spent with patient:  25  min. Care Plan discussed with:  Patient     Family      RN      Consulting Physician [id number],         I have reviewed the flowsheets. Chart and Pertinent Notes have been reviewed. No change in PMH ,family and social history from Consult note.       Lara Ivan MD

## 2021-11-05 NOTE — PROGRESS NOTES
Hospitalist Progress Note NAME: Raymon Magallon :  1933 MRN:  [id number] Assessment / Plan: 
Acute renal failure, likely secondary to post renal obstruction Abdo CT imaging reviewed Nephrology eval appreciated Urology eval appreciated Trend Cr - improving Continue with IV 
 
: 
Repeat US shows resolution of hydro Urology evals appreciated - will keep locke through discharge and pt to follow up with Urology as outpatient for voiding trial 
Cr continues to steadily improve Nephrology evals on going Acute on chronic anemia, likely secondary to GI loss Hgb 6.3 - will transfuse 1 unit pRBC. Discussed with pt and his son Yasir Hyde. Both consented to transfusion. Repeat Hgb every 12 hours IV PPI BID Occult stool still pending GI eval requested : 
Hgb 7.8 - monitor every 12 hours Continue IV PPI BID Pt for EGD this morning GI evals on going HTN 
BPH 
CAD Continue home meds Multiple LE wounds, POA Bilateral heel wounds, POA Wound care evals appreciated 18.5 - 24.9 Normal weight / Body mass index is 19.94 kg/m². Estimated discharge date:  Barriers: 
 
Code status: Full Prophylaxis: Hold Heparin SQ Recommended Disposition: TBD Subjective: Chief Complaint / Reason for Physician Visit Does not feel well - denies any CP or SOB. Discussed with RN events overnight. Review of Systems: 
Symptom Y/N Comments  Symptom Y/N Comments Fever/Chills    Chest Pain Poor Appetite    Edema Cough    Abdominal Pain Sputum    Joint Pain SOB/AUGUSTIN    Pruritis/Rash Nausea/vomit    Tolerating PT/OT Diarrhea    Tolerating Diet Constipation    Other Could NOT obtain due to:   
 
Objective: VITALS:  
Last 24hrs VS reviewed since prior progress note. Most recent are: 
Patient Vitals for the past 24 hrs: 
 Temp Pulse Resp BP SpO2  
21 0934  (P) 65 (P) 16 (!) (P) 109/47 (P) 93 % 21 0837 97.4 °F (36.3 °C) 65 16 (!) 86/52 99 %  
11/05/21 0515 97.4 °F (36.3 °C) 63 17 110/68 96 % 11/05/21 0412 97.6 °F (36.4 °C) 64 19 113/61 94 % 11/04/21 1941 97.5 °F (36.4 °C) 64 20 126/78 97 % 11/1934 97.6 °F (36.4 °C) 65 20 (!) 115/59 96 % 11/04/21 1655 97.5 °F (36.4 °C) 65 16 (!) 107/51 100 % 11/04/21 1631 97.4 °F (36.3 °C) 64 18 (!) 132/92   
11/04/21 1600  65     
11/04/21 1200  65     
11/04/21 1102 97.5 °F (36.4 °C) 65 16 127/64 100 % Intake/Output Summary (Last 24 hours) at 11/5/2021 1007 Last data filed at 11/5/2021 4280 Gross per 24 hour Intake 1002.5 ml Output 2 ml Net 1000.5 ml I had a face to face encounter and independently examined this patient on 11/5/2021, as outlined below: PHYSICAL EXAM: 
General: Alert, cooperative, no acute distress EENT:  EOMI. Anicteric sclerae. MMM Resp:  CTA bilaterally, no wheezing or rales. No accessory muscle use CV:  Regular  rhythm,  No edema GI:  Soft, Non distended, Non tender. +Bowel sounds Neurologic:  Alert and oriented X 3, normal speech, Psych:   Good insight. Not anxious nor agitated Skin:  No rashes. No jaundice Reviewed most current lab test results and cultures  YES Reviewed most current radiology test results   YES Review and summation of old records today    NO Reviewed patient's current orders and MAR    YES 
PMH/SH reviewed - no change compared to H&P 
________________________________________________________________________ Care Plan discussed with: 
  Comments Patient x Family  x   
RN x Care Manager Consultant Multidiciplinary team rounds were held today with , nursing, pharmacist and clinical coordinator. Patient's plan of care was discussed; medications were reviewed and discharge planning was addressed. ________________________________________________________________________ Total NON critical care TIME:  35   Minutes Total CRITICAL CARE TIME Spent:   Minutes non procedure based Comments >50% of visit spent in counseling and coordination of care    
________________________________________________________________________ Reinaldo Vergara MD  
 
Procedures: see electronic medical records for all procedures/Xrays and details which were not copied into this note but were reviewed prior to creation of Plan. LABS: 
I reviewed today's most current labs and imaging studies. Pertinent labs include: 
Recent Labs 11/05/21 0252 11/04/21 0210 11/03/21 
[id number] 11/02/21 2015 11/02/21 2015 WBC  --  9.6 9.2  --  10.8 HGB 7.8* 6.3* 7.3*   < > 8.0*  
HCT 24.0* 20.3* 23.6*   < > 26.5*  
PLT  --  247 250  --  255  
 < > = values in this interval not displayed. Recent Labs 11/05/21 0252 11/04/21 0210 11/03/21 
[id number] 11/02/21 2015 11/02/21 2015  146*  145 143   < > 141  
K 3.3* 3.6  3.6 4.0   < > 3.7 * 116*  115* 112*   < > 109* CO2 22 22  23 21   < > 22  93  93 98   < > 105* BUN 93* 109*  110* 124*   < > 126* CREA 2.76* 3.40*  3.41* 4.26*   < > 5.05* CA 8.4* 8.3*  8.6 8.3*   < > 8.4* MG  --  2.4  --   --   --   
PHOS 2.8 3.8  3.8  --   --   --   
ALB 1.7* 1.9*  1.8*  --   --  2.0*  
TBILI  --  0.2  --   --  0.2 ALT  --  17  --   --  18  
 < > = values in this interval not displayed.   
 
 
Signed: Reinaldo Vergara MD

## 2021-11-05 NOTE — PROGRESS NOTES
TRANSFER - OUT REPORT: 
 
Verbal report given to RN Mesha Torres on Juancarlos Jeong  being transferred to [facility] for routine progression of care Report consisted of patients Situation, Background, Assessment and  
Recommendations(SBAR). Information from the following report(s) SBAR was reviewed with the receiving nurse. Lines:  
Peripheral IV 11/02/21 Left Antecubital (Active) Site Assessment Clean, dry, & intact 11/04/21 1941 Phlebitis Assessment 0 11/04/21 1941 Infiltration Assessment 0 11/04/21 1941 Dressing Status Clean, dry, & intact 11/04/21 1941 Dressing Type Transparent; Tape 11/04/21 1941 Hub Color/Line Status Pink; Infusing 11/04/21 1941 Action Taken Other (comment) 11/04/21 1941 Alcohol Cap Used Yes 11/04/21 1941 Opportunity for questions and clarification was provided. Patient transported with: 
 O2 @2lnc

## 2021-11-05 NOTE — PROGRESS NOTES
Patient started complaining of severe pain to his feet, states his left foot hurt too bad. Messaged Dr. Jazlyn Ray, order obtained for Morphine one time dose given, patient slept for about an hour but woke up moaning. Sent message to Dr. Jazlyn Ray awaiting respond.

## 2021-11-05 NOTE — PROGRESS NOTES
Physician Progress Note Robert Shanks 
CSN #:                  [id number] :                       1933 ADMIT DATE:       2021 7:48 PM 
[address] [place] DATE: 
RESPONDING 
PROVIDER #:        Dago Aceves MD 
 
 
 
 
QUERY TEXT: 
 
Patient admitted with elevate creatinine, . Per wound care noted 11/3/21, noted to also have pressure ulcer. If possible, please document in progress notes and discharge summary the location, present on admission status and stage of the pressure ulcer: The medical record reflects the following: 
Risk Factors: To ED for elevated Creatinine (5.05, baseline 1.5-2), recent right foot TMA on 10/10/21 (dc'd 10/15). Clinical Indicators: 
11/3/21 Wound care PN Wound Heel Right Unstageable heel pressure injury with moist eschar. Wound Heel Left Left heel evolving DTI (deep tissue injury) Treatment: wound care, hydrating gel with Ag; foam, wound cleanser, 
 
Stage 1:  Non-blanchable erythema of intact skin Stage 2:  Abrasion, Blister, Partial-thickness skin loss, with exposed dermis Stage 3:  Full-thickness skin loss with damage or necrosis of subcutaneous tissue Stage 4:  Full-thickness skin & soft tissue loss through to underlying muscle, tendon or bone Unstageable: Obscured full-thickness skin & tissue loss Options provided: 
-- Deep tissue injury evolving of left heel and Unstageable pressure ulcer right heel present on admission -- Deep tissue injury of left heel evolving and Unstageable pressure ulcer right heel not present on admission 
-- Other - I will add my own diagnosis -- Disagree - Not applicable / Not valid -- Disagree - Clinically unable to determine / Unknown 
-- Refer to Clinical Documentation Reviewer PROVIDER RESPONSE TEXT: 
 
This patient has a deep tissue injury evolving of the left heal and Unstageable pressure ulcer right heel which was present on admission.  
 
Query created by: Cristine Deleon on 2021 1:07 PM 
 
 
Electronically signed by:  Alan Guerra MD 11/5/2021 10:45 AM

## 2021-11-05 NOTE — PROGRESS NOTES
TRANSFER - IN REPORT: 
 
Verbal report received from [facility] on [place]  being received from 2109(unit) for ordered procedure Report consisted of patients Situation, Background, Assessment and  
Recommendations(SBAR). Information from the following report(s) SBAR was reviewed with the receiving nurse. Patient needs rapid covid test. RN to complete and patient will be put in transported when resulted.

## 2021-11-06 NOTE — PROGRESS NOTES
..                              Hector River  YOB: 1933          Assessment & Plan:   · Acute kidney injury likely due to post renal obstruction  · Bilateral hydronephrosis left > right   · CKD stage IIIb baseline creatinine close to 2.0   · history of hypertension  · History of BPH  · Anemia  · History of aortic stenosis  ·       PLAN-  · STOP IVF OTHERWISE [facility]  · Urology consult appreciated.  Rayo now , posisble cysto with stent if needed   · Avoid hypotension  · Avoid NSAIDs  · UA had blood and will repeat it urine cx pending   · Urine lytes ATN   · Isat low and will start Iron   · Daily BMP  · Give Lasix with PRBC   · GI consulted        Subjective:   CC:  HPI: Patient seen   ROS: denies worsening sob/cp or edema  Current Facility-Administered Medications   Medication Dose Route Frequency    sodium chloride (NS) flush 5-40 mL  5-40 mL IntraVENous Q8H    sodium chloride (NS) flush 5-40 mL  5-40 mL IntraVENous PRN    sucralfate (CARAFATE) tablet 1 g  1 g Oral AC&HS    nystatin (MYCOSTATIN) 100,000 unit/mL oral suspension 500,000 Units  500,000 Units Oral QID    0.9% sodium chloride infusion 250 mL  250 mL IntraVENous PRN    pantoprazole (PROTONIX) 40 mg in 0.9% sodium chloride 10 mL injection  40 mg IntraVENous Q12H    polyethylene glycol (MIRALAX) packet 17 g  17 g Oral BID    sodium chloride (NS) flush 5-40 mL  5-40 mL IntraVENous Q8H    sodium chloride (NS) flush 5-40 mL  5-40 mL IntraVENous PRN    acetaminophen (TYLENOL) tablet 650 mg  650 mg Oral Q6H PRN    Or    acetaminophen (TYLENOL) suppository 650 mg  650 mg Rectal Q6H PRN    polyethylene glycol (MIRALAX) packet 17 g  17 g Oral DAILY PRN    ondansetron (ZOFRAN ODT) tablet 4 mg  4 mg Oral Q8H PRN    Or    ondansetron (ZOFRAN) injection 4 mg  4 mg IntraVENous Q6H PRN    [Held by provider] heparin (porcine) injection 5,000 Units  5,000 Units SubCUTAneous Q8H          Objective: Vitals:  Blood pressure 108/64, pulse 65, temperature 98 °F (36.7 °C), resp. rate 16, height 5' 11\" (1.803 m), weight 64.9 kg (143 lb), SpO2 96 %. Temp (24hrs), Av.8 °F (36.6 °C), Min:97.4 °F (36.3 °C), Max:98.1 °F (36.7 °C)      Intake and Output:  No intake/output data recorded.  1901 -  0700  In: 2895.4 [P.O.:480; I.V.:2192.9]  Out: 1102 [Urine:1100]    Physical Exam:                Patient is intubated:  n    Physical Examination:   GENERAL ASSESSMENT: NAD  HEENT:Nontraumatic   CHEST: symmetric lung expansion   :Rayo:   EXTREMITY: EDEMA n  NEURO:Grossly non focal          ECG/rhythm:    Data Review      No results for input(s): TNIPOC in the last 72 hours. No lab exists for component: ITNL   No results for input(s): CPK, CKMB, TROIQ in the last 72 hours. Recent Labs     21  0035 21  0252 21  0210    144 146*  145   K 3.8 3.3* 3.6  3.6   * 114* 116*  115*   CO2 25 22 22  23   BUN 87* 93* 109*  110*   CREA 2.62* 2.76* 3.40*  3.41*    100 93  93   PHOS 3.4 2.8 3.8  3.8   MG 1.9  --  2.4   CA 8.5 8.4* 8.3*  8.6   ALB 1.8* 1.7* 1.9*  1.8*   WBC  --   --  9.6   HGB 7.6* 7.8* 6.3*   HCT 25.2* 24.0* 20.3*   PLT  --   --  247      No results for input(s): INR, PTP, APTT, INREXT in the last 72 hours. Needs: urine analysis, urine sodium, protein and creatinine  Lab Results   Component Value Date/Time    Sodium,urine random 36 2021 12:31 PM    Creatinine, urine 58.70 2021 12:31 PM         Discussed with:  Patient     : Mode Segal MD  2021        [facility]:  [email or web address]  Marlene Viveros office:  [address] [place] [address],[address]  [address]  Phone: [phone]  Fax :     [phone]    [address] [place] [address] office:  [address] [place] [address], [address] [place] [address]  Phone - [phone]  Fax - [phone]

## 2021-11-06 NOTE — PROGRESS NOTES
Problem: Falls - Risk of 
Goal: *Absence of Falls Description: Document Jossy Marks Fall Risk and appropriate interventions in the flowsheet. Outcome: Progressing Towards Goal 
Note: Fall Risk Interventions: 
Mobility Interventions: Bed/chair exit alarm, OT consult for ADLs, Patient to call before getting OOB, PT Consult for mobility concerns, PT Consult for assist device competence, Strengthening exercises (ROM-active/passive), Utilize walker, cane, or other assistive device Mentation Interventions: Adequate sleep, hydration, pain control, Bed/chair exit alarm, Door open when patient unattended, Increase mobility, More frequent rounding, Reorient patient, Room close to nurse's station, Toileting rounds, Update white board Medication Interventions: Bed/chair exit alarm, Patient to call before getting OOB, Teach patient to arise slowly Elimination Interventions: Bed/chair exit alarm, Call light in reach, Patient to call for help with toileting needs, Toilet paper/wipes in reach, Toileting schedule/hourly rounds Problem: Patient Education: Go to Patient Education Activity Goal: Patient/Family Education Outcome: Progressing Towards Goal 
  
Problem: Patient Education: Go to Patient Education Activity Goal: Patient/Family Education Outcome: Progressing Towards Goal 
  
Problem: Acute Renal Failure: Day 1 Goal: Off Pathway (Use only if patient is Off Pathway) Outcome: Resolved/Met Goal: Activity/Safety Outcome: Resolved/Met Goal: Consults, if ordered Outcome: Resolved/Met Goal: Diagnostic Test/Procedures Outcome: Resolved/Met Goal: Nutrition/Diet Outcome: Resolved/Met Goal: Discharge Planning Outcome: Resolved/Met Goal: Medications Outcome: Resolved/Met Goal: Respiratory Outcome: Resolved/Met Goal: Treatments/Interventions/Procedures Outcome: Resolved/Met Goal: Psychosocial 
Outcome: Resolved/Met Goal: *Optimal pain control at patient's stated goal 
Outcome: Resolved/Met Goal: *Urinary output within identified parameters Outcome: Resolved/Met Goal: *Hemodynamically stable Outcome: Resolved/Met Goal: *Tolerating diet Outcome: Resolved/Met Problem: Acute Renal Failure: Day 2 Goal: Off Pathway (Use only if patient is Off Pathway) Outcome: Resolved/Met Goal: Activity/Safety Outcome: Resolved/Met Goal: Consults, if ordered Outcome: Resolved/Met Goal: Diagnostic Test/Procedures Outcome: Resolved/Met Goal: Nutrition/Diet Outcome: Resolved/Met Goal: Discharge Planning Outcome: Resolved/Met Goal: Medications Outcome: Resolved/Met Goal: Respiratory Outcome: Resolved/Met Goal: Treatments/Interventions/Procedures Outcome: Resolved/Met Goal: Psychosocial 
Outcome: Resolved/Met Goal: *Optimal pain control at patient's stated goal 
Outcome: Resolved/Met Goal: *Urinary output within identified parameters Outcome: Resolved/Met Goal: *Hemodynamically stable Outcome: Resolved/Met Goal: *Tolerating diet Outcome: Resolved/Met Goal: *Lab values improving Outcome: Resolved/Met Problem: Acute Renal Failure: Day 3 Goal: Off Pathway (Use only if patient is Off Pathway) Outcome: Resolved/Met Goal: Activity/Safety Outcome: Resolved/Met Goal: Consults, if ordered Outcome: Resolved/Met Goal: Diagnostic Test/Procedures Outcome: Resolved/Met Goal: Nutrition/Diet Outcome: Resolved/Met Goal: Discharge Planning Outcome: Resolved/Met Goal: Medications Outcome: Resolved/Met Goal: Respiratory Outcome: Resolved/Met Goal: Treatments/Interventions/Procedures Outcome: Resolved/Met Goal: Psychosocial 
Outcome: Resolved/Met Goal: *Optimal pain control at patient's stated goal 
Outcome: Resolved/Met Goal: *Urinary output within identified parameters Outcome: Resolved/Met Goal: *Hemodynamically stable Outcome: Resolved/Met Goal: *Tolerating diet Outcome: Resolved/Met Goal: *Lab values improving Outcome: Resolved/Met Problem: Acute Renal Failure: Day 4 Goal: Off Pathway (Use only if patient is Off Pathway) Outcome: Progressing Towards Goal 
Goal: Activity/Safety Outcome: Progressing Towards Goal 
Goal: Consults, if ordered Outcome: Progressing Towards Goal 
Goal: Diagnostic Test/Procedures Outcome: Progressing Towards Goal 
Goal: Nutrition/Diet Outcome: Progressing Towards Goal 
Goal: Discharge Planning Outcome: Progressing Towards Goal 
Goal: Medications Outcome: Progressing Towards Goal 
Goal: Respiratory Outcome: Progressing Towards Goal 
Goal: Treatments/Interventions/Procedures Outcome: Progressing Towards Goal 
Goal: Psychosocial 
Outcome: Progressing Towards Goal 
Goal: *Optimal pain control at patient's stated goal 
Outcome: Progressing Towards Goal 
Goal: *Urinary output within identified parameters Outcome: Progressing Towards Goal 
Goal: *Hemodynamically stable Outcome: Progressing Towards Goal 
Goal: *Tolerating diet Outcome: Progressing Towards Goal 
Goal: *Lab values improving Outcome: Progressing Towards Goal 
  
Problem: Pressure Injury - Risk of 
Goal: *Prevention of pressure injury Description: Document Flaco Scale and appropriate interventions in the flowsheet. Outcome: Progressing Towards Goal 
Note: Pressure Injury Interventions: 
Sensory Interventions: Assess changes in LOC, Keep linens dry and wrinkle-free, Maintain/enhance activity level, Minimize linen layers, Pressure redistribution bed/mattress (bed type), Sit a 90-degree angle/use footstool if needed, Turn and reposition approx. every two hours (pillows and wedges if needed), Use 30-degree side-lying position, Float heels Moisture Interventions: Absorbent underpads, Check for incontinence Q2 hours and as needed, Internal/External urinary devices, Minimize layers, Offer toileting Q_hr Activity Interventions: Increase time out of bed, Pressure redistribution bed/mattress(bed type), PT/OT evaluation Mobility Interventions: HOB 30 degrees or less, Pressure redistribution bed/mattress (bed type), PT/OT evaluation, Float heels, Turn and reposition approx. every two hours(pillow and wedges) Nutrition Interventions: Document food/fluid/supplement intake, Offer support with meals,snacks and hydration Friction and Shear Interventions: Apply protective barrier, creams and emollients, Foam dressings/transparent film/skin sealants, HOB 30 degrees or less, Lift team/patient mobility team, Minimize layers, Sit at 90-degree angle Problem: Patient Education: Go to Patient Education Activity Goal: Patient/Family Education Outcome: Progressing Towards Goal

## 2021-11-06 NOTE — PROGRESS NOTES
Hospitalist Progress Note NAME: Juancarlos Jeong :  1933 MRN:  [id number] Assessment / Plan: 
Acute renal failure, likely secondary to post renal obstruction Abdo CT imaging reviewed Nephrology eval appreciated Urology eval appreciated Trend Cr - improving Continue with IV 
 
: 
Repeat US shows resolution of hydro Urology evals appreciated - will keep locke through discharge and pt to follow up with Urology as outpatient for voiding trial 
Cr continues to steadily improve Nephrology evals on going Acute on chronic anemia, likely secondary to GI loss Hgb 6.3 - will transfuse 1 unit pRBC. Discussed with pt and his son Jairon Chou. Both consented to transfusion. Repeat Hgb every 12 hours IV PPI BID Occult stool still pending GI eval requested : 
Hgb 7.8 - monitor every 12 hours Continue IV PPI BID Pt for EGD this morning GI evals on going : 
Candida esophagitis EGD revealed large duodenal ulcer, the likely cause of pt's bleed Continue PPI BID. Added Sucralfate QID. Biopsies were taken - will need follow up Hgb is relatively stable. Will continue holding SQ Heparin. Possible resumption tomorrow. HTN 
BPH 
CAD Continue home meds Multiple LE wounds, POA Bilateral heel wounds, POA Wound care evals appreciated 18.5 - 24.9 Normal weight / Body mass index is 19.94 kg/m². Estimated discharge date:  Barriers: 
 
Code status: Full Prophylaxis: Hold Heparin SQ Recommended Disposition: TBD Subjective: Chief Complaint / Reason for Physician Visit Updated son, Jairon Chou, over the phone. Discussed with RN events overnight. Review of Systems: 
Symptom Y/N Comments  Symptom Y/N Comments Fever/Chills    Chest Pain Poor Appetite    Edema Cough    Abdominal Pain Sputum    Joint Pain SOB/AUGUSTIN    Pruritis/Rash Nausea/vomit    Tolerating PT/OT Diarrhea    Tolerating Diet Constipation    Other Could NOT obtain due to: Objective: VITALS:  
Last 24hrs VS reviewed since prior progress note. Most recent are: 
Patient Vitals for the past 24 hrs: 
 Temp Pulse Resp BP SpO2  
11/06/21 0816 98 °F (36.7 °C) 65 16 108/64 96 % 11/06/21 0314 97.9 °F (36.6 °C) 68 16 132/67 96 % 11/05/21 2330 97.8 °F (36.6 °C) 67 16 125/68 94 % 11/05/21 1936 97.5 °F (36.4 °C) 65 16 119/67 95 % 11/05/21 1818 97.6 °F (36.4 °C)      
11/05/21 1602   13    
11/05/21 1559  65  118/61   
11/05/21 1226  65 14 116/85 96 % 11/05/21 1038  65 14 120/73 96 % 11/05/21 1019  65 12    
11/05/21 1018  65 15 (!) 140/54 100 % 11/05/21 1017  65 11    
11/05/21 1016  65 18    
11/05/21 1015 98.1 °F (36.7 °C) 65 26 122/62 97 % 11/05/21 1014  65 20  98 % 11/05/21 1013  65 30  97 % 11/05/21 1012  65 9 (!) 145/50 97 % 11/05/21 1001  65 18 (!) 96/42 100 % 11/05/21 0934  65 16 (!) 109/47 93 % Intake/Output Summary (Last 24 hours) at 11/6/2021 3996 Last data filed at 11/6/2021 7363 Gross per 24 hour Intake 1892.92 ml Output 1100 ml Net 792.92 ml I had a face to face encounter and independently examined this patient on 11/6/2021, as outlined below: PHYSICAL EXAM: 
General: Alert, cooperative, no acute distress EENT:  EOMI. Anicteric sclerae. MMM Resp:  CTA bilaterally, no wheezing or rales. No accessory muscle use CV:  Regular  rhythm,  No edema GI:  Soft, Non distended, Non tender. +Bowel sounds Neurologic:  Alert and oriented X 3, normal speech, Psych:   Good insight. Not anxious nor agitated Skin:  No rashes. No jaundice Reviewed most current lab test results and cultures  YES Reviewed most current radiology test results   YES Review and summation of old records today    NO Reviewed patient's current orders and MAR    YES 
PMH/SH reviewed - no change compared to H&P 
________________________________________________________________________ Care Plan discussed with: 
  Comments Patient x Family  x   
RN x Care Manager Consultant Multidiciplinary team rounds were held today with , nursing, pharmacist and clinical coordinator. Patient's plan of care was discussed; medications were reviewed and discharge planning was addressed. ________________________________________________________________________ Total NON critical care TIME:  35   Minutes Total CRITICAL CARE TIME Spent:   Minutes non procedure based Comments >50% of visit spent in counseling and coordination of care    
________________________________________________________________________ Dorota Ray MD  
 
Procedures: see electronic medical records for all procedures/Xrays and details which were not copied into this note but were reviewed prior to creation of Plan. LABS: 
I reviewed today's most current labs and imaging studies. Pertinent labs include: 
Recent Labs 11/06/21 
1929 11/05/21 0252 11/04/21 0210 WBC  --   --  9.6 HGB 7.6* 7.8* 6.3* HCT 25.2* 24.0* 20.3* PLT  --   --  247 Recent Labs 11/06/21 
1139 11/05/21 
0252 11/04/21 
0210  144 146*  145  
K 3.8 3.3* 3.6  3.6 * 114* 116*  115* CO2 25 22 22  23  100 93  93 BUN 87* 93* 109*  110* CREA 2.62* 2.76* 3.40*  3.41* CA 8.5 8.4* 8.3*  8.6 MG 1.9  --  2.4 PHOS 3.4 2.8 3.8  3.8 ALB 1.8* 1.7* 1.9*  1.8* TBILI  --   --  0.2 ALT  --   --  17 Signed: Dorota Ray MD

## 2021-11-07 NOTE — PROGRESS NOTES
End of Shift Note    Bedside shift change report given to [facility] (oncoming nurse) by Natividad Sampson (offgoing nurse). Report included the following information SBAR, Kardex, Procedure Summary, Intake/Output, MAR and Recent Results    Shift worked:  7a-7p     Shift summary and any significant changes:     Pt medicated once for foot pain. Tolerating diet with fair appetite. Dressings to feet remain intact- not due to be changed. Pt repositioned frequently, heels elevated. Pt had 1 BM. Concerns for physician to address:       Zone phone for oncoming shift:          Activity:  Activity Level: Up with Assistance  Number times ambulated in hallways past shift: 0  Number of times OOB to chair past shift: 0    Cardiac:   Cardiac Monitoring: Yes      Cardiac Rhythm: Ventricular Paced    Access:   Current line(s): PIV     Genitourinary:   Urinary status: locke    Respiratory:   O2 Device: None (Room air)  Chronic home O2 use?: NO  Incentive spirometer at bedside: N/A     GI:  Last Bowel Movement Date: 11/05/21  Current diet:  ADULT ORAL NUTRITION SUPPLEMENT Lunch, Dinner; Standard High Calorie/High Protein  ADULT DIET Regular  Passing flatus: YES  Tolerating current diet: YES       Pain Management:   Patient states pain is manageable on current regimen: YES    Skin:  Flaco Score: 14  Interventions: turn team, float heels, foam dressing and limit briefs    Patient Safety:  Fall Score:  Total Score: 4  Interventions: assistive device (walker, cane, etc), gripper socks, pt to call before getting OOB and stay with me (per policy)  High Fall Risk: Yes    Length of Stay:  Expected LOS: 3d 7h  Actual LOS: [address] [address]

## 2021-11-07 NOTE — PROGRESS NOTES
Hospitalist Progress Note    NAME: Brock Yin   :  1933   MRN:  [id number]       Assessment / Plan:  Acute renal failure, likely secondary to post renal obstruction  Abdo CT imaging reviewed  Nephrology eval appreciated  Urology eval appreciated  Trend Cr - improving  Continue with IV    :  Repeat US shows resolution of hydro  Urology evals appreciated - will keep locke through discharge and pt to follow up with Urology as outpatient for voiding trial  Cr continues to steadily improve  Nephrology evals on going    Acute on chronic anemia, likely secondary to GI loss  Hgb 6.3 - will transfuse 1 unit pRBC. Discussed with pt and his son Pratima Solo. Both consented to transfusion. Repeat Hgb every 12 hours  IV PPI BID  Occult stool still pending  GI eval requested    :  Hgb 7.8 - monitor every 12 hours  Continue IV PPI BID  Pt for EGD this morning  GI evals on going    :  Candida esophagitis  EGD revealed large duodenal ulcer, the likely cause of pt's bleed  Continue PPI BID. Added Sucralfate QID. Biopsies were taken - will need follow up  Hgb is relatively stable. Will continue holding SQ Heparin. Possible resumption tomorrow. :  Hgb remains stable  Continue PPI BID. Added Sucralfate QID. Biopsies were taken - will need follow up  Hopeful to discharge early this week    HTN  BPH  CAD  Continue home meds    Multiple LE wounds, POA  Bilateral heel wounds, POA  Wound care evals appreciated    18.5 - 24.9 Normal weight / Body mass index is 19.94 kg/m². Estimated discharge date:   Barriers:    Code status: Full  Prophylaxis: Hold Heparin SQ  Recommended Disposition: TBD     Subjective:     Chief Complaint / Reason for Physician Visit  Updated son, Pratima Solo, over the phone. Discussed with RN events overnight.      Review of Systems:  Symptom Y/N Comments  Symptom Y/N Comments   Fever/Chills    Chest Pain     Poor Appetite    Edema     Cough    Abdominal Pain     Sputum    Joint Pain SOB/AUGUSTIN    Pruritis/Rash     Nausea/vomit    Tolerating PT/OT     Diarrhea    Tolerating Diet     Constipation    Other       Could NOT obtain due to:      Objective:     VITALS:   Last 24hrs VS reviewed since prior progress note. Most recent are:  Patient Vitals for the past 24 hrs:   Temp Pulse Resp BP SpO2   11/07/21 0842 97.5 °F (36.4 °C) 64 18 (!) 97/58 95 %   11/07/21 0249 97.8 °F (36.6 °C) 63 18 123/83 99 %   11/06/21 2257 98.2 °F (36.8 °C) 64 18 (!) 148/82 93 %   11/06/21 1935 97.9 °F (36.6 °C) 65 18 (!) 154/85 92 %   11/06/21 1508 97.7 °F (36.5 °C) 100 16 117/71 90 %   11/06/21 1133 97.4 °F (36.3 °C) 65 16 (!) 127/53 92 %       Intake/Output Summary (Last 24 hours) at 11/7/2021 0956  Last data filed at 11/7/2021 0941  Gross per 24 hour   Intake 1460 ml   Output 1350 ml   Net 110 ml        I had a face to face encounter and independently examined this patient on 11/7/2021, as outlined below:  PHYSICAL EXAM:  General: Alert, cooperative, no acute distress    EENT:  EOMI. Anicteric sclerae. MMM  Resp:  CTA bilaterally, no wheezing or rales. No accessory muscle use  CV:  Regular  rhythm,  No edema  GI:  Soft, Non distended, Non tender. +Bowel sounds  Neurologic:  Alert and oriented X 3, normal speech,   Psych:   Good insight. Not anxious nor agitated  Skin:  No rashes. No jaundice    Reviewed most current lab test results and cultures  YES  Reviewed most current radiology test results   YES  Review and summation of old records today    NO  Reviewed patient's current orders and MAR    YES  PMH/SH reviewed - no change compared to H&P  ________________________________________________________________________  Care Plan discussed with:    Comments   Patient x    Family  x    RN x    Care Manager     Consultant                        Multidiciplinary team rounds were held today with , nursing, pharmacist and clinical coordinator.   Patient's plan of care was discussed; medications were reviewed and discharge planning was addressed. ________________________________________________________________________  Total NON critical care TIME:  35   Minutes    Total CRITICAL CARE TIME Spent:   Minutes non procedure based      Comments   >50% of visit spent in counseling and coordination of care     ________________________________________________________________________  Santana Clemens MD     Procedures: see electronic medical records for all procedures/Xrays and details which were not copied into this note but were reviewed prior to creation of Plan. LABS:  I reviewed today's most current labs and imaging studies.   Pertinent labs include:  Recent Labs     11/07/21  0025 11/06/21  1154 11/06/21  0035   HGB 7.3* 7.7* 7.6*   HCT 23.7* 25.2* 25.2*     Recent Labs     11/07/21  0025 11/06/21  0035 11/05/21  0252   * 143 144   K 4.2 3.8 3.3*   * 114* 114*   CO2 24 25 22   GLU 95 100 100   BUN 74* 87* 93*   CREA 2.48* 2.62* 2.76*   CA 8.6 8.5 8.4*   MG 1.8 1.9  --    PHOS 2.5* 3.4 2.8   ALB 1.8* 1.8* 1.7*       Signed: Santana Clemens MD

## 2021-11-07 NOTE — PROGRESS NOTES
14: 59PM Outbound call to patient's spouse @ [phone]. No answer. Edvin Mckenzie of the call re: to inform spouse of recommendation for SNF, at disposition. CM to follow up. Raghavendra Macon, ELIZABETH            14:34PM Writer went to patient's room to discuss d/c recommendation of SNF. Patient sleep and didn't wake up when his name was called.         Raghavendra Macon, ELIZABETH

## 2021-11-08 NOTE — PROGRESS NOTES
Brief GI Note    EGD noted by Dr. Teresita Hilton on 11/5. Large DU and erosive gastritis    Recommend:  - BID PPI  - Sucralfate QID x14 days  - Followed up pathology (biopsied to exclude H. Pylori)    GI will sign off. Patient should follow up upon discharge with GI in 1 month or so.  Feel free to call with any questions    Signed By: Penelope Sexton MD     November 8, 2021 Skin Substitute Text: The defect edges were debeveled with a #15 scalpel blade.  Given the location of the defect, shape of the defect and the proximity to free margins a skin substitute graft was deemed most appropriate.  The graft material was trimmed to fit the size of the defect. The graft was then placed in the primary defect and oriented appropriately.

## 2021-11-08 NOTE — PROGRESS NOTES
Problem: Self Care Deficits Care Plan (Adult)  Goal: *Acute Goals and Plan of Care (Insert Text)  Description:   FUNCTIONAL STATUS PRIOR TO ADMISSION: Pt poor historian, with difficulty staying aware during evaluation; therefore, PLOF taken from chart, indicating pt was Mod Independent with ADLs at [facility], with noted instability and pt refusing to use RW. 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 HOME SUPPORT: The patient lived with wife to provide PRN ADL/IADL assist.    Occupational Therapy Goals  Initiated 11/5/2021  1. Patient will perform seated grooming with supervision/set-up within 7 day(s). 2.  Patient will perform seated upper body dressing with supervision/set-up within 7 day(s). 3.  Patient will perform EOB/RW lower body dressing with moderate assistance within 7 day(s). 4.  Patient will perform RW toilet transfers with moderate assistance within 7 day(s). 5.  Patient will perform all aspects of RW toileting with moderate assistance within 7 day(s). Outcome: Progressing Towards Goal    OCCUPATIONAL THERAPY TREATMENT  Patient: Faviola Andres (56 y.o. male)  Date: 11/8/2021  Diagnosis: ARF (acute renal failure) ([facility]) [N17.9]   <principal problem not specified>  Procedure(s) (LRB):  ESOPHAGOGASTRODUODENOSCOPY (EGD) (N/A)  ESOPHAGOGASTRODUODENAL (EGD) BIOPSY (N/A) 3 Days Post-Op  Precautions: NWB (RLE NWB- transmetarsal amputation 10/10/21)  Chart, occupational therapy assessment, plan of care, and goals were reviewed. ASSESSMENT  Patient continues with skilled OT services and is progressing towards goals. Pt noted with progressive seated balance and activity tolerance, functionally evidenced by completing oral/facial/hand hygiene, seated at EOB with overall Min A for thoroughness of task completion, with Mod A to come to/from EOB and no LOB. Pt noted with bloody discharge from locke insertion site, with c/o pain (did not quantify) with RN aware and following.   Pt continues to benefit from skilled OT to address functional deficits during acute hospitalization, with reporting therapist believing pt would benefit from SNF rehab upon discharge. Current Level of Function Impacting Discharge (ADLs): Max A LE ADLs    Other factors to consider for discharge: RLE NWB         PLAN :  Patient continues to benefit from skilled intervention to address the above impairments. Continue treatment per established plan of care to address goals. Recommend with staff: Frequent positional changes, bed level ADL engagement    Recommend next OT session: POC progression    Recommendation for discharge: (in order for the patient to meet his/her long term goals)  Therapy up to 5 days/week in SNF setting    This discharge recommendation:  Has been made in collaboration with the attending provider and/or case management    IF patient discharges home will need the following DME: TBD       SUBJECTIVE:   Patient stated what time is it? I think I already ate breakfast.    OBJECTIVE DATA SUMMARY:   Cognitive/Behavioral Status:  Neurologic State: Alert; Confused  Orientation Level: Oriented to person; Oriented to place; Disoriented to time  Cognition: Follows commands  Perception: Appears intact  Perseveration: No perseveration noted  Safety/Judgement: Decreased awareness of need for assistance; Decreased awareness of need for safety; Decreased insight into deficits    Functional Mobility and Transfers for ADLs:  Bed Mobility:  Rolling: Minimum assistance  Supine to Sit: Moderate assistance  Sit to Supine: Moderate assistance    Balance:  Sitting: Impaired; With support  Sitting - Static: Good (unsupported)  Sitting - Dynamic: Good (unsupported);  Fair (occasional)    ADL Intervention:  Grooming  Grooming Assistance: Minimum assistance  Position Performed: Seated edge of bed  Washing Face: Set-up  Washing Hands: Set-up  Brushing Teeth: Minimum assistance    Cognitive Retraining  Safety/Judgement: Decreased awareness of need for assistance; Decreased awareness of need for safety; Decreased insight into deficits    Pain:  Bloody discharge noted at locke insertion site, with c/o pain, did not quantify; RN notified and following    Activity Tolerance:   Good, Fair, and requires rest breaks    After treatment patient left in no apparent distress:   Supine in bed, Call bell within reach, Bed / chair alarm activated, and Side rails x 3    COMMUNICATION/COLLABORATION:   The patients plan of care was discussed with: Registered nurse and Case management.      Rhona Fernandez OT  Time Calculation: 16 mins

## 2021-11-08 NOTE — PROGRESS NOTES
Problem: Falls - Risk of  Goal: *Absence of Falls  Description: Document Benjy Schwartz Fall Risk and appropriate interventions in the flowsheet.   Outcome: Progressing Towards Goal  Note: Fall Risk Interventions:  Mobility Interventions: Bed/chair exit alarm, Patient to call before getting OOB    Mentation Interventions: Adequate sleep, hydration, pain control, Bed/chair exit alarm    Medication Interventions: Bed/chair exit alarm, Patient to call before getting OOB, Teach patient to arise slowly    Elimination Interventions: Bed/chair exit alarm, Call light in reach, Patient to call for help with toileting needs, Toilet paper/wipes in reach, Toileting schedule/hourly rounds              Problem: Patient Education: Go to Patient Education Activity  Goal: Patient/Family Education  Outcome: Progressing Towards Goal     Problem: Patient Education: Go to Patient Education Activity  Goal: Patient/Family Education  Outcome: Progressing Towards Goal     Problem: Acute Renal Failure: Day 4  Goal: Off Pathway (Use only if patient is Off Pathway)  Outcome: Progressing Towards Goal  Goal: Activity/Safety  Outcome: Progressing Towards Goal  Goal: Consults, if ordered  Outcome: Progressing Towards Goal  Goal: Diagnostic Test/Procedures  Outcome: Progressing Towards Goal  Goal: Nutrition/Diet  Outcome: Progressing Towards Goal  Goal: Discharge Planning  Outcome: Progressing Towards Goal  Goal: Medications  Outcome: Progressing Towards Goal  Goal: Respiratory  Outcome: Progressing Towards Goal  Goal: Treatments/Interventions/Procedures  Outcome: Progressing Towards Goal  Goal: Psychosocial  Outcome: Progressing Towards Goal  Goal: *Optimal pain control at patient's stated goal  Outcome: Progressing Towards Goal  Goal: *Urinary output within identified parameters  Outcome: Progressing Towards Goal  Goal: *Hemodynamically stable  Outcome: Progressing Towards Goal  Goal: *Tolerating diet  Outcome: Progressing Towards Goal  Goal: *Lab values improving  Outcome: Progressing Towards Goal     Problem: Acute Renal Failure: Day 5  Goal: Off Pathway (Use only if patient is Off Pathway)  Outcome: Progressing Towards Goal  Goal: Activity/Safety  Outcome: Progressing Towards Goal  Goal: Diagnostic Test/Procedures  Outcome: Progressing Towards Goal  Goal: Nutrition/Diet  Outcome: Progressing Towards Goal  Goal: Discharge Planning  Outcome: Progressing Towards Goal  Goal: Medications  Outcome: Progressing Towards Goal  Goal: Respiratory  Outcome: Progressing Towards Goal  Goal: Treatments/Interventions/Procedures  Outcome: Progressing Towards Goal  Goal: Psychosocial  Outcome: Progressing Towards Goal  Goal: *Optimal pain control at patient's stated goal  Outcome: Progressing Towards Goal  Goal: *Urinary output within identified parameters  Outcome: Progressing Towards Goal  Goal: *Hemodynamically stable  Outcome: Progressing Towards Goal  Goal: *Tolerating diet  Outcome: Progressing Towards Goal  Goal: *Lab values improving  Outcome: Progressing Towards Goal     Problem: Acute Renal Failure: Day 6  Goal: Off Pathway (Use only if patient is Off Pathway)  Outcome: Progressing Towards Goal  Goal: Activity/Safety  Outcome: Progressing Towards Goal  Goal: Diagnostic Test/Procedures  Outcome: Progressing Towards Goal  Goal: Nutrition/Diet  Outcome: Progressing Towards Goal  Goal: Discharge Planning  Outcome: Progressing Towards Goal  Goal: Medications  Outcome: Progressing Towards Goal  Goal: Respiratory  Outcome: Progressing Towards Goal  Goal: Treatments/Interventions/Procedures  Outcome: Progressing Towards Goal  Goal: Psychosocial  Outcome: Progressing Towards Goal     Problem: Acute Renal Failure: Discharge Outcomes  Goal: *Optimal pain control at patient's stated goal  Outcome: Progressing Towards Goal  Goal: *Urinary output within identified parameters  Outcome: Progressing Towards Goal  Goal: *Hemodynamically stable  Outcome: Progressing Towards Goal  Goal: *Tolerating diet  Outcome: Progressing Towards Goal  Goal: *Lab values stabilized  Outcome: Progressing Towards Goal  Goal: *Verbalizes understanding and describes medication purposes and frequencies  Outcome: Progressing Towards Goal  Goal: *Medication reconciliation  Outcome: Progressing Towards Goal     Problem: Patient Education: Go to Patient Education Activity  Goal: Patient/Family Education  Outcome: Progressing Towards Goal     Problem: Acute Renal Failure: Day 1  Goal: Off Pathway (Use only if patient is Off Pathway)  Outcome: Progressing Towards Goal  Goal: Activity/Safety  Outcome: Progressing Towards Goal  Goal: Consults, if ordered  Outcome: Progressing Towards Goal  Goal: Diagnostic Test/Procedures  Outcome: Progressing Towards Goal  Goal: Nutrition/Diet  Outcome: Progressing Towards Goal  Goal: Discharge Planning  Outcome: Progressing Towards Goal  Goal: Medications  Outcome: Progressing Towards Goal  Goal: Respiratory  Outcome: Progressing Towards Goal  Goal: Treatments/Interventions/Procedures  Outcome: Progressing Towards Goal  Goal: Psychosocial  Outcome: Progressing Towards Goal  Goal: *Optimal pain control at patient's stated goal  Outcome: Progressing Towards Goal  Goal: *Urinary output within identified parameters  Outcome: Progressing Towards Goal  Goal: *Hemodynamically stable  Outcome: Progressing Towards Goal  Goal: *Tolerating diet  Outcome: Progressing Towards Goal     Problem: Acute Renal Failure: Day 2  Goal: Off Pathway (Use only if patient is Off Pathway)  Outcome: Progressing Towards Goal  Goal: Activity/Safety  Outcome: Progressing Towards Goal  Goal: Consults, if ordered  Outcome: Progressing Towards Goal  Goal: Diagnostic Test/Procedures  Outcome: Progressing Towards Goal  Goal: Nutrition/Diet  Outcome: Progressing Towards Goal  Goal: Discharge Planning  Outcome: Progressing Towards Goal  Goal: Medications  Outcome: Progressing Towards Goal  Goal: Respiratory  Outcome: Progressing Towards Goal  Goal: Treatments/Interventions/Procedures  Outcome: Progressing Towards Goal  Goal: Psychosocial  Outcome: Progressing Towards Goal  Goal: *Optimal pain control at patient's stated goal  Outcome: Progressing Towards Goal  Goal: *Urinary output within identified parameters  Outcome: Progressing Towards Goal  Goal: *Hemodynamically stable  Outcome: Progressing Towards Goal  Goal: *Tolerating diet  Outcome: Progressing Towards Goal  Goal: *Lab values improving  Outcome: Progressing Towards Goal     Problem: Acute Renal Failure: Day 3  Goal: Off Pathway (Use only if patient is Off Pathway)  Outcome: Progressing Towards Goal  Goal: Activity/Safety  Outcome: Progressing Towards Goal  Goal: Consults, if ordered  Outcome: Progressing Towards Goal  Goal: Diagnostic Test/Procedures  Outcome: Progressing Towards Goal  Goal: Nutrition/Diet  Outcome: Progressing Towards Goal  Goal: Discharge Planning  Outcome: Progressing Towards Goal  Goal: Medications  Outcome: Progressing Towards Goal  Goal: Respiratory  Outcome: Progressing Towards Goal  Goal: Treatments/Interventions/Procedures  Outcome: Progressing Towards Goal  Goal: Psychosocial  Outcome: Progressing Towards Goal  Goal: *Optimal pain control at patient's stated goal  Outcome: Progressing Towards Goal  Goal: *Urinary output within identified parameters  Outcome: Progressing Towards Goal  Goal: *Hemodynamically stable  Outcome: Progressing Towards Goal  Goal: *Tolerating diet  Outcome: Progressing Towards Goal  Goal: *Lab values improving  Outcome: Progressing Towards Goal     Problem: Acute Renal Failure: Day 4  Goal: Off Pathway (Use only if patient is Off Pathway)  Outcome: Progressing Towards Goal  Goal: Activity/Safety  Outcome: Progressing Towards Goal  Goal: Consults, if ordered  Outcome: Progressing Towards Goal  Goal: Diagnostic Test/Procedures  Outcome: Progressing Towards Goal  Goal: Nutrition/Diet  Outcome: Progressing Towards Goal  Goal: Discharge Planning  Outcome: Progressing Towards Goal  Goal: Medications  Outcome: Progressing Towards Goal  Goal: Respiratory  Outcome: Progressing Towards Goal  Goal: Treatments/Interventions/Procedures  Outcome: Progressing Towards Goal  Goal: Psychosocial  Outcome: Progressing Towards Goal  Goal: *Optimal pain control at patient's stated goal  Outcome: Progressing Towards Goal  Goal: *Urinary output within identified parameters  Outcome: Progressing Towards Goal  Goal: *Hemodynamically stable  Outcome: Progressing Towards Goal  Goal: *Tolerating diet  Outcome: Progressing Towards Goal  Goal: *Lab values improving  Outcome: Progressing Towards Goal     Problem: Acute Renal Failure: Day 5  Goal: Off Pathway (Use only if patient is Off Pathway)  Outcome: Progressing Towards Goal  Goal: Activity/Safety  Outcome: Progressing Towards Goal  Goal: Diagnostic Test/Procedures  Outcome: Progressing Towards Goal  Goal: Nutrition/Diet  Outcome: Progressing Towards Goal  Goal: Discharge Planning  Outcome: Progressing Towards Goal  Goal: Medications  Outcome: Progressing Towards Goal  Goal: Respiratory  Outcome: Progressing Towards Goal  Goal: Treatments/Interventions/Procedures  Outcome: Progressing Towards Goal  Goal: Psychosocial  Outcome: Progressing Towards Goal  Goal: *Optimal pain control at patient's stated goal  Outcome: Progressing Towards Goal  Goal: *Urinary output within identified parameters  Outcome: Progressing Towards Goal  Goal: *Hemodynamically stable  Outcome: Progressing Towards Goal  Goal: *Tolerating diet  Outcome: Progressing Towards Goal  Goal: *Lab values improving  Outcome: Progressing Towards Goal     Problem: Acute Renal Failure: Day 6  Goal: Off Pathway (Use only if patient is Off Pathway)  Outcome: Progressing Towards Goal  Goal: Activity/Safety  Outcome: Progressing Towards Goal  Goal: Diagnostic Test/Procedures  Outcome: Progressing Towards Goal  Goal: Nutrition/Diet  Outcome: Progressing Towards Goal  Goal: Discharge Planning  Outcome: Progressing Towards Goal  Goal: Medications  Outcome: Progressing Towards Goal  Goal: Respiratory  Outcome: Progressing Towards Goal  Goal: Treatments/Interventions/Procedures  Outcome: Progressing Towards Goal  Goal: Psychosocial  Outcome: Progressing Towards Goal     Problem: Acute Renal Failure: Discharge Outcomes  Goal: *Optimal pain control at patient's stated goal  Outcome: Progressing Towards Goal  Goal: *Urinary output within identified parameters  Outcome: Progressing Towards Goal  Goal: *Hemodynamically stable  Outcome: Progressing Towards Goal  Goal: *Tolerating diet  Outcome: Progressing Towards Goal  Goal: *Lab values stabilized  Outcome: Progressing Towards Goal  Goal: *Verbalizes understanding and describes medication purposes and frequencies  Outcome: Progressing Towards Goal  Goal: *Medication reconciliation  Outcome: Progressing Towards Goal

## 2021-11-08 NOTE — DISCHARGE SUMMARY
Hospitalist Discharge Summary     Patient ID:  Faviola Andres  [id number]  54 y.o.  1/6/1933 11/2/2021    PCP on record: RYLIE Javed    Admit date: 11/2/2021  Discharge date and time: 11/8/2021    DISCHARGE DIAGNOSIS:  See below    CONSULTATIONS:  IP CONSULT TO HOSPITALIST  IP CONSULT TO UROLOGY  IP CONSULT TO NEPHROLOGY  IP CONSULT TO GASTROENTEROLOGY    Excerpted HPI from H&P of Jolene Rod MD:  80years old male from nursing home with past medical history significant for CAD, history of liver disease, hypertension, history of aortic valve stenosis presented to the hospital for evaluation of abnormal renal function, patient denies any complaint, patient was recently discharged from the hospital October 15, s/p right foot TMA October 10, patient was discharged home with oral antibiotic Augmentin, blood work in ED was significant for creatinine 5.05, patient baseline creatinine 1.5-2.     ______________________________________________________________________  DISCHARGE SUMMARY/HOSPITAL COURSE:  for full details see H&P, daily progress notes, labs, consult notes. Acute renal failure, likely secondary to post renal obstruction  Abdo CT imaging reviewed  Nephrology eval appreciated  Urology eval appreciated  Trend Cr - improving  Continue with IV     11/6:  Repeat US shows resolution of hydro  Urology evals appreciated - will keep locke through discharge and pt to follow up with Urology as outpatient for voiding trial  Cr continues to steadily improve  Nephrology evals on going    11/8:  Renal function nearly at baseline  Will discharge with locke - pt to follow up with Urology for outpatient for voiding trial     Acute on chronic anemia, likely secondary to GI loss  Hgb 6.3 - will transfuse 1 unit pRBC. Discussed with pt and his son Jenice Duane. Both consented to transfusion.   Repeat Hgb every 12 hours  IV PPI BID  Occult stool still pending  GI eval requested     11/5:  Hgb 7.8 - monitor every 12 hours  Continue IV PPI BID  Pt for EGD this morning  GI evals on going     11/6:  Candida esophagitis  EGD revealed large duodenal ulcer, the likely cause of pt's bleed  Continue PPI BID. Added Sucralfate QID. Biopsies were taken - will need follow up  Hgb is relatively stable. Will continue holding SQ Heparin. Possible resumption tomorrow.     11/7:  Hgb remains stable  Continue PPI BID. Added Sucralfate QID. Biopsies were taken - will need follow up  Hopeful to discharge early this week    11/8:  Hgb stable  Pt stable for discharge and follow up with GI in 1 month     HTN  BPH  CAD  Continue home meds    Multiple LE wounds, POA  Bilateral heel wounds, POA  Wound care evals appreciated    _______________________________________________________________________  Patient seen and examined by me on discharge day. Pertinent Findings:  Gen:    Not in distress  Chest: Clear lungs  CVS:   Regular rhythm. No edema  Abd:  Soft, not distended, not tender  Neuro:  Alert, oriented x3  _______________________________________________________________________  DISCHARGE MEDICATIONS:   Current Discharge Medication List      START taking these medications    Details   nystatin (MYCOSTATIN) 100,000 unit/mL suspension Take 5 mL by mouth four (4) times daily. swish and spit  Qty: 600 mL, Refills: 0  Start date: 11/8/2021      pantoprazole (PROTONIX) 40 mg tablet Take 1 Tablet by mouth two (2) times a day. Qty: 60 Tablet, Refills: 1  Start date: 11/8/2021      sucralfate (CARAFATE) 1 gram tablet Take 1 Tablet by mouth four (4) times daily. Qty: 120 Tablet, Refills: 0  Start date: 11/8/2021         CONTINUE these medications which have NOT CHANGED    Details   aspirin delayed-release 81 mg tablet Take 1 Tablet by mouth daily. Qty: 90 Tablet, Refills: 3      L.acid,para-B. bifidum-S.therm (RISAQUAD) 8 billion cell cap cap Take 1 Capsule by mouth daily.  While taking antibxs  Qty: 7 Capsule, Refills: 0      melatonin 3 mg tablet Take 2 Tablets by mouth nightly. Qty: 180 Tablet, Refills: 3      finasteride (PROSCAR) 5 mg tablet Take 5 mg by mouth.      metoprolol tartrate (LOPRESSOR) 25 mg tablet Take 25 mg by mouth daily. Associated Diagnoses: Coronary artery disease involving native coronary artery of native heart without angina pectoris; SSS (sick sinus syndrome) ([address] Bradycardia; H/O aortic valve stenosis; H/O aortic valve replacement         STOP taking these medications       amoxicillin-clavulanate (Augmentin) 500-125 mg per tablet Comments:   Reason for Stopping:                 Patient Follow Up Instructions:    Activity: Activity as tolerated  Diet: Resume previous diet  Wound Care: Routine catheter care    Follow-up Information     Follow up With Specialties Details Why Contact Info    [facility],[address] Formerly named [facility]    Orquidea Egan, [place] Nurse Practitioner   [id number]  [phone]      [facility]  In 3 weeks  [address] [id number]    Alina Fontaine MD Gastroenterology In 1 month  [address] [facility]  [phone]          ________________________________________________________________    Risk of deterioration: High    Condition at Discharge:  Stable  __________________________________________________________________    Disposition  SNF/LTC    ____________________________________________________________________    Code Status: Full Code  ___________________________________________________________________      Total time in minutes spent coordinating this discharge (includes going over instructions, follow-up, prescriptions, and preparing report for sign off to her PCP) :  >30 minutes    Signed:  Minnie Wilkins MD

## 2021-11-08 NOTE — PROGRESS NOTES
Transition of Care Plan:     RUR: 20% high risk  Disposition: SNF - [facility]  Follow up appointments: SNF to arrange  DME needed: Pt has a cane and RW  Transportation at Discharge: BLS via [facility] at 3:30 pm  Keys or means to access home: N/A       IM Medicare Letter: Received on 11/8/2021  Is patient a BCPI-A Bundle:      No                If yes, was Bundle Letter given?:     Caregiver Contact: Wife, Jeni Marie ([phone])  Discharge Caregiver contacted prior to discharge? CM will contact cg prior to d/c    CM acknowledged d/c orders. Pt will d/c to [facility] via [facility] at 3:30 pm. Nurse call report to  Room 106 B. CM met with pt and his wife at the bedside and provided them with an update. They both requested that I call their son Kojo Rain. CM spoke to Kojo Rain and he is in agreement with d/c plan. Medicare pt has received, reviewed, and signed 2nd  letter informing them of their right to appeal the discharge. Signed copy has been placed on pt bedside chart. Care Management Interventions  PCP Verified by CM: Yes  Mode of Transport at Discharge: [address]  [address] Time of Discharge: 1500  Transition of Care Consult (CM Consult): SNF  Partner SN[facility] Yes  [facility]  (Pt has [facility] cane and RW)  Discharge Durable Medical Equipment: No  Physical Therapy Consult: Yes  Occupational Therapy Consult: Yes  Speech Therapy Consult: No  Support Systems: Child(prasanna), Spouse/Significant Other  Confirm Follow Up Transport: Family  Discharge Location  Discharge Placement: Skilled nursing facility    Transition of Care Plan to SNF/Rehab    SNF/Rehab Transition:  Patient has been accepted to [facility]  and meets criteria for admission. Patient will transported by [facility] and expected to leave at 3:30 pm.    Communication to Patient/Family:  Met with patient and pt's wife and son Kojo Rain and they are agreeable to the transition plan.     Communication to SNF/Rehab:  Bedside RN, Meliza Fuontain, has been notified to update the transition plan to the facility and call report (phone number [phone]). Discharge information has been updated on the AVS.       Nursing Please include all hard scripts for controlled substances, med rec and dc summary, and AVS in packet. Reviewed and confirmed with facility, [facility] [facility], can manage the patient care needs for the following:     [place] with (X) only those applicable:    Medication:  [x]  Medications will be available at the facility  []  IV Antibiotics   []  Controlled Substance - hard copy to be sent with patient   []  Weekly Labs   Documents:  [] Hard RX  [x] MAR  [x] Kardex  [x] AVS  []Transfer Summary  [x]Discharge   Equipment:  []  CPAP/BiPAP  []  Wound Vacuum  [x]  Rayo or Urinary Device  []  PICC/Central Line  []  Nebulizer  []  Ventilator   Treatment:  []Isolation (for MRSA, VRE, etc.)  []Surgical Drain Management  []Tracheostomy Care  []Dressing Changes  []Dialysis with transportation and chair time . []PEG Care  []Oxygen  []Daily Weights for Heart Failure   Dietary:  [x]Any diet limitations Adult Diet  []Tube Feedings   []Total Parenteral Management (TPN)   Eligible for Medicaid Long Term Services and Supports  Yes:  [] Eligible for medical assistance or will become eligible within 180 days and UAI completed. [] Provider/Patient and/or support system has requested screening. [] UAI copy provided to patient or responsible party,  [] UAI unavailable at discharge will send once processed to SNF provider. [] UAI unavailable at discharged mailed to patient  No:   [] Private pay and is not financially eligible for Medicaid within the next 180 days. [] Reside out-of-state.   [] A residents of a state owned/operated facility that is licensed  by [facility] [facility] [employer] or [facility]  [] Enrollment in [facility] hospice services  [] [address]  [x] Patient /Family declines to have screening completed or provide financial information for screening     Financial Resources:  Medicaid    [] Initiated and application pending   [] Full coverage  [x] VA Medicare Part A & B   Advanced Care Plan:  []Surrogate Decision Maker of Care  []POA  [x]Communicated Code Status FULL   Other     ELIZABETH Cruz  Care Manager [facility]  [phone]

## 2021-11-08 NOTE — INTERDISCIPLINARY ROUNDS
Interdisciplinary Rounds were completed on 11/08/21 for this patient. Rounds included nursing, clinical care leader, pharmacy, and case management. Plan of care discussed. See clinical pathway and/or care plan for interventions and desired outcomes.

## 2021-11-08 NOTE — PROGRESS NOTES
Nephrology Progress Note  Hector Shi     [facility]. [facility]  Phone - [phone]   Patient: Areli Lester    YOB: 1933        Date- 11/8/2021   Admit Date: 11/2/2021  CC: Follow up for  YANCY on CKD         IMPRESSION & PLAN:   · Acute kidney injury likely due to post renal obstruction  · Bilateral hydronephrosis left > right   · CKD stage IIIb baseline creatinine close to 2.0   · history of hypertension  · History of BPH  · Anemia          History of aortic stenosis     PLAN-   Cr stable and near baseline   UOP excellent   Can be DC from renal stand point with Rayo cath   F/U in office in one month      Subjective: Interval History:   -  Seen and examined  -Cr stable  - No issue overnight  -Off IVF    Objective:   Vitals:    11/07/21 1524 11/07/21 1955 11/07/21 2256 11/08/21 0307   BP: (!) 151/74 (!) 148/68 (!) 152/70 (!) 148/67   Pulse: 65 65 65 65   Resp: 18 18 18 18   Temp: 97.6 °F (36.4 °C) 97.9 °F (36.6 °C) 98.2 °F (36.8 °C) 97.8 °F (36.6 °C)   SpO2: 95% 97% 95% 96%   Weight:       Height:          11/07 0701 - 11/08 0700  In: 1680 [P.O.:1680]  Out: 5769 [Urine:1450]  Last 3 Recorded Weights in this Encounter    11/02/21 1959   Weight: 64.9 kg (143 lb)      Physical exam:   GEN: NAD  NECK- Supple, no mass  RESP: No wheezing, Clear b/l  CVS: S1,S2  RRR  NEURO: Normal speech, Non focal  EXT: No Edema   PSYCH: Normal Mood    Chart reviewed. Pertinent Notes reviewed. Data Review :  Recent Labs     11/08/21  0215 11/07/21  0025 11/06/21  0035    146* 143   K 3.8 4.2 3.8   * 117* 114*   CO2 24 24 25   BUN 66* 74* 87*   CREA 2.36* 2.48* 2.62*    95 100   CA 8.8 8.6 8.5   MG  --  1.8 1.9   PHOS 2.4* 2.5* 3.4     Recent Labs     11/08/21  0215 11/07/21  0025 11/06/21  1154   WBC 12.1*  --   --    HGB 7.4* 7.3* 7.7*   HCT 25.1* 23.7* 25.2*     --   --      No results for input(s): FE, TIBC, PSAT, FERR in the last 72 hours. Medication list  reviewed  Current Facility-Administered Medications   Medication Dose Route Frequency    influenza vaccine 2021-22 (6 mos+)(PF) (FLUARIX/FLULAVAL/FLUZONE QUAD) injection 0.5 mL  1 Each IntraMUSCular PRIOR TO DISCHARGE    traMADoL (ULTRAM) tablet 50 mg  50 mg Oral Q12H PRN    balsam peru-castor oiL (VENELEX) ointment   Topical BID    sodium chloride (NS) flush 5-40 mL  5-40 mL IntraVENous Q8H    sodium chloride (NS) flush 5-40 mL  5-40 mL IntraVENous PRN    sucralfate (CARAFATE) tablet 1 g  1 g Oral AC&HS    nystatin (MYCOSTATIN) 100,000 unit/mL oral suspension 500,000 Units  500,000 Units Oral QID    0.9% sodium chloride infusion 250 mL  250 mL IntraVENous PRN    pantoprazole (PROTONIX) 40 mg in 0.9% sodium chloride 10 mL injection  40 mg IntraVENous Q12H    polyethylene glycol (MIRALAX) packet 17 g  17 g Oral BID    acetaminophen (TYLENOL) tablet 650 mg  650 mg Oral Q6H PRN    Or    acetaminophen (TYLENOL) suppository 650 mg  650 mg Rectal Q6H PRN    polyethylene glycol (MIRALAX) packet 17 g  17 g Oral DAILY PRN    ondansetron (ZOFRAN ODT) tablet 4 mg  4 mg Oral Q8H PRN    Or    ondansetron (ZOFRAN) injection 4 mg  4 mg IntraVENous Q6H PRN    [Held by provider] heparin (porcine) injection 5,000 Units  5,000 Units SubCUTAneous Q8H          Sujit Medellin MD              [facility]  [facility], [address] [place], [address]  Phone - [phone]               Fax - [phone]

## 2021-11-08 NOTE — PROGRESS NOTES
Spiritual Care Assessment/Progress Note  [facility]      NAME: Rafa Dotson      MRN: [id number]  AGE: 80 y.o.  SEX: male  Moravian Affiliation: Anabaptism   Language: English     11/8/2021     Total Time (in minutes): 10     Spiritual Assessment begun in [facility] 2 MED TELE through conversation with:         [x]Patient        [] Family    [] Friend(s)        Reason for Consult: Initial/Spiritual assessment, patient floor     Spiritual beliefs: (Please include comment if needed)     [x] Identifies with a hernando tradition: Lacy Alonzo        [] Supported by a hernando community:            [] Claims no spiritual orientation:           [] Seeking spiritual identity:                [] Adheres to an individual form of spirituality:           [] Not able to assess:                           Identified resources for coping:      [] Prayer                               [] Music                  [] Guided Imagery     [x] Family/friends                 [] Pet visits     [] Devotional reading                         [] Unknown     [] Other:                                               Interventions offered during this visit: (See comments for more details)    Patient Interventions: Affirmation of emotions/emotional suffering, Catharsis/review of pertinent events in supportive environment, Coping skills reviewed/reinforced, Initial/Spiritual assessment, patient floor           Plan of Care:     [] Support spiritual and/or cultural needs    [] Support AMD and/or advance care planning process      [] Support grieving process   [] Coordinate Rites and/or Rituals    [] Coordination with community clergy   [] No spiritual needs identified at this time   [] Detailed Plan of Care below (See Comments)  [] Make referral to Music Therapy  [] Make referral to Pet Therapy     [] Make referral to Addiction services  [] Make referral to [facility]  [] Make referral to Spiritual Care Partner  [] No future visits requested [x] Contact Spiritual Care for further referrals     Comments:     Initial Spiritual Care Assessment visit for  Mr. Wayne Fulton in 2109. Patient was alert, no family was present. He said his wife is supposed to be with him today, waiting for his wife. He denied any concern or needs, asked  to bring some water, phone and his glass.  helped him by bringing what he asked. He was thankful for 's visit. Advised of  availability.       [id number] [facility] WALESKA GillDiv,Jaye.M, [address] Provider   Paging Service [phone])

## 2021-11-08 NOTE — PROGRESS NOTES
Pharmacist Discharge Medication Reconciliation    Significant PMH:   Past Medical History:   Diagnosis Date    CAD (coronary artery disease), native coronary artery      RCA    Chronic kidney disease (CKD) stage G3b/A2, moderately decreased glomerular filtration rate (GFR) between 30-44 mL/min/1.73 square meter and albuminuria creatinine ratio between  mg/g ([facility])     Creatinine elevation 2020    H/O aortic valve stenosis     post valve replacement    Hypertension     Liver disease     Hepatits A in 1980s    Pacemaker     Raynaud's disease     SSS (sick sinus syndrome) ([facility] [address]     pacemaker    Valvular heart disease      Encounter Diagnoses:   Encounter Diagnosis   Name Primary? Acute renal failure, unspecified acute renal failure type ([facility] [address] Yes   Acute on chronic anemia, likely secondary to GI loss  Candida esophagitis    Allergies: Morphine, Codeine, and Simvastatin    Discharge Medications:   Current Discharge Medication List        START taking these medications    Details   nystatin (MYCOSTATIN) 100,000 unit/mL suspension Take 5 mL by mouth four (4) times daily. swish and spit  Qty: 600 mL, Refills: 0  Start date: 11/8/2021      pantoprazole (PROTONIX) 40 mg tablet Take 1 Tablet by mouth two (2) times a day. Qty: 60 Tablet, Refills: 1  Start date: 11/8/2021      sucralfate (CARAFATE) 1 gram tablet Take 1 Tablet by mouth four (4) times daily. Qty: 120 Tablet, Refills: 0  Start date: 11/8/2021           CONTINUE these medications which have NOT CHANGED    Details   aspirin delayed-release 81 mg tablet Take 1 Tablet by mouth daily. Qty: 90 Tablet, Refills: 3      L.acid,para-B. bifidum-S.therm (RISAQUAD) 8 billion cell cap cap Take 1 Capsule by mouth daily. While taking antibxs  Qty: 7 Capsule, Refills: 0      melatonin 3 mg tablet Take 2 Tablets by mouth nightly.   Qty: 180 Tablet, Refills: 3      finasteride (PROSCAR) 5 mg tablet Take 5 mg by mouth.      metoprolol tartrate (LOPRESSOR) 25 mg tablet Take 25 mg by mouth daily. Associated Diagnoses: Coronary artery disease involving native coronary artery of native heart without angina pectoris; SSS (sick sinus syndrome) ([address] Bradycardia; H/O aortic valve stenosis; H/O aortic valve replacement           STOP taking these medications       amoxicillin-clavulanate (Augmentin) 500-125 mg per tablet Comments:   Reason for Stopping:               The patient's chart, MAR and AVS were reviewed by Heather Jennings RP.     Discharging Provider: Celestino Solorzano MD    Thank you,     Heather Jennings, [facility]

## 2021-11-23 NOTE — PROGRESS NOTES
[address] [place] [address] Dr Discharge Note    *The information contained in this note was received during a weekly care coordination call with the post acute facility*      Patient was discharged from [address] [place] [address] ([facility]) on 11/15/21, into [address] [place] [address] services. PCP: RYLIE Denny appointment: [facility] following pt    Home Health/Outpatient orders at discharge: [address] [place] [address] Road: [address] [facility] Team will sign off at this time. Medications were not reconciled and general patient assessment was not completed during this skilled nursing facility outreach.      Terell Haskins   BSN, RN  [facility]

## 2021-12-13 ENCOUNTER — TELEPHONE (OUTPATIENT)
Dept: CARDIOLOGY CLINIC | Age: 86
End: 2021-12-13

## 2021-12-13 NOTE — TELEPHONE ENCOUNTER
Pt's wife wants to know if she needs to drop his pacemaker device and his heart monitor off. Pt passed away on 11/21/21 and she would like to know what needs to be done with these. She stated that if she didn't answer a VM can be left letting her know what to do.       Callback is [phone]    Thanks  Braulio Cabezas

## 2022-09-02 NOTE — PROGRESS NOTES
ELECTROPHYSIOLOGY Subjective:  
  
Tommy Medellin is a 80 y.o. male is here for EP follow up. Denies chest pain, pressure, tightness. S/p bilat angiogram with angioplasty. Post operatively he had diarrhea and weakness. Continues to smoke. Patient Active Problem List  
 Diagnosis Date Noted  Bradycardia 12/16/2016  SSS (sick sinus syndrome) ([facility] [address]  Hyperlipidemia  H/O aortic valve stenosis  CAD (coronary artery disease), native coronary artery None Past Medical History:  
Diagnosis Date  CAD (coronary artery disease), native coronary artery  RCA  Creatinine elevation 2020  H/O aortic valve stenosis   
 post valve replacement  Hyperlipidemia  Murmur  Pacemaker  Raynaud's disease  SSS (sick sinus syndrome) ([facility] [address]   
 pacemaker  Valvular heart disease Past Surgical History:  
Procedure Laterality Date  HX AORTIC VALVE REPLACEMENT    
 HX PACEMAKER Allergies Allergen Reactions  Codeine Other (comments)  
   pt states that he became high with morphine--yrs ago 
 pt states that he became high with morphine--yrs ago  Simvastatin Myalgia and Other (comments) And weakness And weakness Family History Problem Relation Age of Onset  Stroke Mother  Stroke Father  Cancer Sister  Heart Disease Brother MI  
 negative for cardiac disease Social History Socioeconomic History  Marital status:  Spouse name: Not on file  Number of children: Not on file  Years of education: Not on file  Highest education level: Not on file Tobacco Use  Smoking status: Current Every Day Smoker Packs/day: 0.25 Years: 30.00 Pack years: 7.50 Types: Cigarettes  Smokeless tobacco: Never Used Substance and Sexual Activity  Alcohol use: Yes Alcohol/week: 1.0 standard drinks Types: 1 Glasses of wine per week Comment: weekly Current Outpatient Medications  
Medication Sig  
• prasugreL (EFFIENT) 10 mg tablet Take 10 mg by mouth daily.  
• finasteride (PROSCAR) 5 mg tablet Take 5 mg by mouth.  
• tamsulosin (FLOMAX) 0.4 mg capsule tamsulosin 0.4 mg capsule  
• metoprolol tartrate (LOPRESSOR) 25 mg tablet Take 25 mg by mouth daily.  
• aspirin 81 mg chewable tablet Take 81 mg by mouth daily.  
• OTHER   
 
No current facility-administered medications for this visit.   
  
Vitals:  
 21 1441  
BP: (!) 148/88  
Pulse: 88  
Resp: 18  
Weight: 147 lb 12.8 oz (67 kg)  
Height: 5' 10\" (1.778 m)  
 
 
I have reviewed the nurses notes, vitals, problem list, allergy list, medical history, family, social history and medications. 
 
Review of Symptoms: 
 
General: Pt denies excessive weight gain or loss. Pt is able to conduct ADL's 
HEENT: Denies blurred vision, headaches, epistaxis and difficulty swallowing. 
Respiratory: Denies shortness of breath, AUGUSTIN, wheezing or stridor. 
Cardiovascular: Denies precordial pain, palpitations, edema or PND 
Gastrointestinal: Denies poor appetite, indigestion, abdominal pain or blood in stool 
Urinary: Denies dysuria, pyuria 
Musculoskeletal: Denies pain or swelling from muscles or joints 
Neurologic: Denies tremor, paresthesias, or sensory motor disturbance 
Skin: Denies rash, itching or texture change. 
Psych: Denies depression 
 
 
Physical Exam:   
 
General: Well developed, in no acute distress. 
HEENT: Eyes - PERRL 
Heart:  Normal S1/S2 negative S3 or S4. Regular, no murmur 
Respiratory: Clear bilaterally x 4, no wheezing or rales 
Extremities:  No edema, no cyanosis. 
Musculoskeletal: No clubbing 
Neuro: A&Ox3, speech clear 
Skin: No visible rashes or lesions. Non diaphoretic. No visible ulcers 
Vascular: 2+ pulses symmetric in all extremities 
Psych - judgement intact and orientation is wnl  
 
 
Cardiographics 
 
Ek21  V paced 
 
No results found for this or any previous visit. 
 
 
No results found for:  WBC, HGBPOC, HGB, HGBP, HCTPOC, HCT, PHCT, RBCH, PLT, MCV, HGBEXT, HCTEXT, PLTEXT, HGBEXT, HCTEXT, PLTEXT No results found for: NA, K, CL, CO2, AGAP, GLU, BUN, CREA, BUCR, GFRAA, GFRNA, CA, TBIL, TBILI, AP, TP, ALB, GLOB, AGRAT, ALT No results found for: TSH, TSH2, TSH3, TSHP, TSHEXT, TSHEXT Assessment: ICD-10-CM ICD-9-CM 1. CHB (complete heart block) ([facility])  I44.2 426.0 2. SSS (sick sinus syndrome) ([facility])  I49.5 427.81   
3. Bradycardia  R00.1 427.89   
4. Cardiac pacemaker in situ  Z95.0 V45.01   
5. Pacemaker reprogramming/check  Z45.018 V53.31 AMB POC EKG ROUTINE W/ 12 LEADS, INTER & REP Orders Placed This Encounter  AMB POC EKG ROUTINE W/ 12 LEADS, INTER & REP Order Specific Question:   Reason for Exam: Answer:   routine  prasugreL (EFFIENT) 10 mg tablet Sig: Take 10 mg by mouth daily. Plan:  
 
Mr Sarah Conn is here for annual follow up and device check. He is 70% AP and 100% RVP with no events. Some dyspnea on exertion and fatigue. Last echo 3/19 - EF 51 - 55%. He has 8 mo remaining on his battery. Will repeat echo in July prior to device change. During this visit,  the patient and I had a comprehensive discussion of device management using principles of shared decision making. we reviewed device therapy, including the potential risks and benefits of device management. These risks include death, myocardial infarction, stroke, cardiac perforation, vascular injury, injury, pacing induced cardiomyopathy, inappropriate shocks (defibrillator) and other less severe complications. The patient demonstrated a clear understanding of these issues during out discussion. Our plans, determined together after thorough consideration, are outlined else where in this note. Addressed all patient questions and concerns at this visit. Thank you for allowing me to participate in Areli Lester 's care. Florencio Liu NP Patient was made aware during visit today that all testing completed would be instantaneously available on their MyChart for review. Discussed that these results will be made available to the provider at the same time. They were advised to wait at least 3 business days to allow for provider's interpretation of results with follow-up before calling our office with concerns about their results. 34.2

## (undated) DEVICE — 450 ML BOTTLE OF 0.05% CHLORHEXIDINE GLUCONATE IN 99.95% STERILE WATER FOR IRRIGATION, USP AND APPLICATOR.: Brand: IRRISEPT ANTIMICROBIAL WOUND LAVAGE

## (undated) DEVICE — STRIP SKIN CLSR W0.25XL4IN WHT SPUNBOUND FBR NYL HI ADH

## (undated) DEVICE — SUTURE COAT VCRL SZ 4-0 L18IN ABSRB UD L19MM PS-2 1/2 CIR J496G

## (undated) DEVICE — BANDAGE,GAUZE,BULKEE II,4.5"X4.1YD,STRL: Brand: MEDLINE

## (undated) DEVICE — BANDAGE,GAUZE,CONFORMING,3"X75",STRL,LF: Brand: MEDLINE

## (undated) DEVICE — 1200 GUARD II KIT W/5MM TUBE W/O VAC TUBE: Brand: GUARDIAN

## (undated) DEVICE — SYR 3ML LL TIP 1/10ML GRAD --

## (undated) DEVICE — TOWEL 4 PLY TISS 19X30 SUE WHT

## (undated) DEVICE — SUTURE VCRL SZ 3-0 L18IN ABSRB UD PS-2 L19MM 1/2 CIR J497G

## (undated) DEVICE — SURGICAL PROCEDURE PACK BASIN MAJ SET CUST NO CAUT

## (undated) DEVICE — SOL IRRIGATION INJ NACL 0.9% 500ML BTL

## (undated) DEVICE — STRAP,POSITIONING,KNEE/BODY,FOAM,4X60": Brand: MEDLINE

## (undated) DEVICE — FORCEPS BX L160CM DIA8MM GRSP DISECT CUP TIP NONLOCKING ROT

## (undated) DEVICE — SET ADMIN 16ML TBNG L100IN 2 Y INJ SITE IV PIGGY BK DISP

## (undated) DEVICE — BANDAGE COBAN 4 IN COMPR W4INXL5YD FOAM COHESIVE QUIK STK SELF ADH SFT

## (undated) DEVICE — REM POLYHESIVE ADULT PATIENT RETURN ELECTRODE: Brand: VALLEYLAB

## (undated) DEVICE — GLOVE ORTHO 8   MSG9480

## (undated) DEVICE — Device

## (undated) DEVICE — MEDI-VAC YANK SUCT HNDL W/TPRD BULBOUS TIP & NON-CONDUCTIVE: Brand: CARDINAL HEALTH

## (undated) DEVICE — SOL IRR SOD CL 0.9% 1000ML BTL --

## (undated) DEVICE — NEONATAL-ADULT SPO2 SENSOR: Brand: NELLCOR

## (undated) DEVICE — BLADE ASSEMB CLP HAIR FINE --

## (undated) DEVICE — CATH IV AUTOGRD BC PNK 20GA 25 -- INSYTE

## (undated) DEVICE — NEEDLE HYPO 18GA L1.5IN PNK S STL HUB POLYPR SHLD REG BVL

## (undated) DEVICE — SPONGE GZ W4XL4IN COT 12 PLY TYP VII WVN C FLD DSGN

## (undated) DEVICE — ROCKER SWITCH PENCIL BLADE ELECTRODE, HOLSTER: Brand: EDGE

## (undated) DEVICE — DRESSING,GAUZE,XEROFORM,CURAD,1"X8",ST: Brand: CURAD

## (undated) DEVICE — BLOCK BITE ENDOSCP AD 21 MM W/ DIL BLU LF DISP

## (undated) DEVICE — GOWN,SIRUS,NONRNF,SETINSLV,2XL,18/CS: Brand: MEDLINE

## (undated) DEVICE — YANKAUER,TAPERED BULBOUS TIP,W/O VENT: Brand: MEDLINE

## (undated) DEVICE — ELECTRODE,RADIOTRANSLUCENT,FOAM,5PK: Brand: MEDLINE

## (undated) DEVICE — BASIN EMSIS 16OZ GRAPHITE PLAS KID SHP MOLD GRAD FOR ORAL

## (undated) DEVICE — CONTAINER SPEC 20 ML LID NEUT BUFF FORMALIN 10 % POLYPR STS

## (undated) DEVICE — YANKAUER,BULB TIP,W/O VENT,RIGID,STERILE: Brand: MEDLINE

## (undated) DEVICE — ZIMMER® STERILE DISPOSABLE TOURNIQUET CUFF WITH PROTECTIVE SLEEVE AND PLC, DUAL PORT, SINGLE BLADDER, 18 IN. (46 CM)

## (undated) DEVICE — COVER,TABLE,44X90,STERILE: Brand: MEDLINE

## (undated) DEVICE — DRAPE,EXTREMITY,89X128,STERILE: Brand: MEDLINE

## (undated) DEVICE — SOLIDIFIER FLD 2OZ 1500CC N DISINF IN BTL DISP SAFESORB

## (undated) DEVICE — STERILE POLYISOPRENE POWDER-FREE SURGICAL GLOVES: Brand: PROTEXIS

## (undated) DEVICE — Z DISCONTINUED PER MEDLINE LINE GAS SAMPLING O2/CO2 LNG AD 13 FT NSL W/ TBNG FILTERLINE

## (undated) DEVICE — TOWEL SURG W17XL27IN STD BLU COT NONFENESTRATED PREWASHED

## (undated) DEVICE — SYR 10ML LUER LOK 1/5ML GRAD --

## (undated) DEVICE — BAG SPEC BIOHZRD 10 X 10 IN --

## (undated) DEVICE — SUTURE ETHLN SZ 2-0 L18IN NONABSORBABLE BLK L26MM FS 3/8 664G

## (undated) DEVICE — EXTREMITY-MRMC: Brand: MEDLINE INDUSTRIES, INC.

## (undated) DEVICE — INFECTION CONTROL KIT SYS

## (undated) DEVICE — DRSG GZ OIL EMUL CURAD 3X3 --

## (undated) DEVICE — PREP SKN CHLRAPRP APL 26ML STR --